# Patient Record
Sex: FEMALE | Race: BLACK OR AFRICAN AMERICAN | NOT HISPANIC OR LATINO | Employment: OTHER | URBAN - METROPOLITAN AREA
[De-identification: names, ages, dates, MRNs, and addresses within clinical notes are randomized per-mention and may not be internally consistent; named-entity substitution may affect disease eponyms.]

---

## 2017-01-01 ENCOUNTER — TELEPHONE (OUTPATIENT)
Dept: INFUSION THERAPY | Facility: HOSPITAL | Age: 62
End: 2017-01-01

## 2017-01-01 ENCOUNTER — INITIAL CONSULT (OUTPATIENT)
Dept: INTERVENTIONAL RADIOLOGY/VASCULAR | Facility: CLINIC | Age: 62
End: 2017-01-01
Payer: MEDICARE

## 2017-01-01 ENCOUNTER — INFUSION (OUTPATIENT)
Dept: INFUSION THERAPY | Facility: HOSPITAL | Age: 62
End: 2017-01-01
Attending: INTERNAL MEDICINE
Payer: MEDICARE

## 2017-01-01 ENCOUNTER — HOSPITAL ENCOUNTER (OUTPATIENT)
Dept: RADIOLOGY | Facility: HOSPITAL | Age: 62
Discharge: HOME | End: 2017-02-21
Attending: INTERNAL MEDICINE
Payer: MEDICARE

## 2017-01-01 ENCOUNTER — TELEPHONE (OUTPATIENT)
Dept: NEUROSURGERY | Facility: CLINIC | Age: 62
End: 2017-01-01

## 2017-01-01 ENCOUNTER — DOCUMENTATION ONLY (OUTPATIENT)
Dept: RADIATION ONCOLOGY | Facility: CLINIC | Age: 62
End: 2017-01-01

## 2017-01-01 ENCOUNTER — HOSPITAL ENCOUNTER (INPATIENT)
Facility: HOSPITAL | Age: 62
LOS: 2 days | Discharge: HOME | DRG: 054 | End: 2017-11-28
Attending: EMERGENCY MEDICINE | Admitting: ANESTHESIOLOGY
Payer: MEDICARE

## 2017-01-01 ENCOUNTER — OFFICE VISIT (OUTPATIENT)
Dept: HEMATOLOGY/ONCOLOGY | Facility: CLINIC | Age: 62
End: 2017-01-01
Payer: MEDICARE

## 2017-01-01 ENCOUNTER — LAB VISIT (OUTPATIENT)
Dept: LAB | Facility: HOSPITAL | Age: 62
End: 2017-01-01
Attending: INTERNAL MEDICINE
Payer: MEDICARE

## 2017-01-01 ENCOUNTER — LAB VISIT (OUTPATIENT)
Dept: LAB | Facility: OTHER | Age: 62
End: 2017-01-01
Attending: INTERNAL MEDICINE
Payer: MEDICARE

## 2017-01-01 ENCOUNTER — DOCUMENTATION ONLY (OUTPATIENT)
Dept: HEMATOLOGY/ONCOLOGY | Facility: CLINIC | Age: 62
End: 2017-01-01

## 2017-01-01 ENCOUNTER — LAB VISIT (OUTPATIENT)
Dept: LAB | Facility: HOSPITAL | Age: 62
End: 2017-01-01
Payer: MEDICARE

## 2017-01-01 ENCOUNTER — TELEPHONE (OUTPATIENT)
Dept: HEMATOLOGY/ONCOLOGY | Facility: CLINIC | Age: 62
End: 2017-01-01

## 2017-01-01 ENCOUNTER — TELEPHONE (OUTPATIENT)
Dept: INTERNAL MEDICINE | Facility: CLINIC | Age: 62
End: 2017-01-01

## 2017-01-01 ENCOUNTER — OFFICE VISIT (OUTPATIENT)
Dept: PRIMARY CARE CLINIC | Facility: CLINIC | Age: 62
End: 2017-01-01
Payer: MEDICARE

## 2017-01-01 ENCOUNTER — APPOINTMENT (OUTPATIENT)
Dept: RADIATION THERAPY | Facility: HOSPITAL | Age: 62
End: 2017-01-01
Attending: RADIOLOGY
Payer: MEDICARE

## 2017-01-01 ENCOUNTER — HOSPITAL ENCOUNTER (OUTPATIENT)
Facility: HOSPITAL | Age: 62
Discharge: HOME | End: 2017-03-14
Attending: INTERNAL MEDICINE | Admitting: INTERNAL MEDICINE
Payer: MEDICARE

## 2017-01-01 ENCOUNTER — OFFICE VISIT (OUTPATIENT)
Dept: HEMATOLOGY/ONCOLOGY | Facility: CLINIC | Age: 62
End: 2017-01-01
Attending: INTERNAL MEDICINE
Payer: MEDICARE

## 2017-01-01 ENCOUNTER — DOCUMENTATION ONLY (OUTPATIENT)
Dept: INFUSION THERAPY | Facility: HOSPITAL | Age: 62
End: 2017-01-01

## 2017-01-01 ENCOUNTER — TELEPHONE (OUTPATIENT)
Dept: SURGERY | Facility: CLINIC | Age: 62
End: 2017-01-01

## 2017-01-01 ENCOUNTER — INITIAL CONSULT (OUTPATIENT)
Dept: RADIATION ONCOLOGY | Facility: CLINIC | Age: 62
End: 2017-01-01
Attending: RADIOLOGY
Payer: MEDICARE

## 2017-01-01 ENCOUNTER — HOSPITAL ENCOUNTER (OUTPATIENT)
Dept: RADIOLOGY | Facility: HOSPITAL | Age: 62
Discharge: HOME | End: 2017-07-06
Attending: INTERNAL MEDICINE
Payer: MEDICARE

## 2017-01-01 ENCOUNTER — HOSPITAL ENCOUNTER (OUTPATIENT)
Dept: RADIATION THERAPY | Facility: HOSPITAL | Age: 62
Discharge: HOME | End: 2017-12-12
Attending: RADIOLOGY
Payer: MEDICARE

## 2017-01-01 VITALS
SYSTOLIC BLOOD PRESSURE: 199 MMHG | HEART RATE: 93 BPM | TEMPERATURE: 98 F | RESPIRATION RATE: 22 BRPM | DIASTOLIC BLOOD PRESSURE: 96 MMHG | WEIGHT: 108 LBS | OXYGEN SATURATION: 100 % | BODY MASS INDEX: 17.98 KG/M2

## 2017-01-01 VITALS
OXYGEN SATURATION: 92 % | RESPIRATION RATE: 18 BRPM | SYSTOLIC BLOOD PRESSURE: 146 MMHG | HEART RATE: 91 BPM | SYSTOLIC BLOOD PRESSURE: 186 MMHG | DIASTOLIC BLOOD PRESSURE: 87 MMHG | HEART RATE: 95 BPM | DIASTOLIC BLOOD PRESSURE: 82 MMHG | RESPIRATION RATE: 18 BRPM | TEMPERATURE: 98 F | BODY MASS INDEX: 14.53 KG/M2 | WEIGHT: 90.38 LBS | HEIGHT: 66 IN

## 2017-01-01 VITALS
RESPIRATION RATE: 18 BRPM | DIASTOLIC BLOOD PRESSURE: 68 MMHG | HEART RATE: 86 BPM | OXYGEN SATURATION: 98 % | BODY MASS INDEX: 15.06 KG/M2 | TEMPERATURE: 97 F | HEIGHT: 65 IN | SYSTOLIC BLOOD PRESSURE: 146 MMHG | WEIGHT: 90.38 LBS

## 2017-01-01 VITALS
HEART RATE: 82 BPM | TEMPERATURE: 98 F | HEART RATE: 80 BPM | DIASTOLIC BLOOD PRESSURE: 69 MMHG | DIASTOLIC BLOOD PRESSURE: 81 MMHG | TEMPERATURE: 98 F | RESPIRATION RATE: 18 BRPM | RESPIRATION RATE: 18 BRPM | HEIGHT: 66 IN | WEIGHT: 92.63 LBS | SYSTOLIC BLOOD PRESSURE: 177 MMHG | HEIGHT: 66 IN | DIASTOLIC BLOOD PRESSURE: 77 MMHG | OXYGEN SATURATION: 98 % | BODY MASS INDEX: 14.89 KG/M2 | HEART RATE: 81 BPM | SYSTOLIC BLOOD PRESSURE: 160 MMHG | TEMPERATURE: 98 F | RESPIRATION RATE: 18 BRPM | SYSTOLIC BLOOD PRESSURE: 149 MMHG | SYSTOLIC BLOOD PRESSURE: 144 MMHG | DIASTOLIC BLOOD PRESSURE: 68 MMHG | BODY MASS INDEX: 14.89 KG/M2 | RESPIRATION RATE: 18 BRPM | WEIGHT: 92.63 LBS | HEART RATE: 84 BPM

## 2017-01-01 VITALS
DIASTOLIC BLOOD PRESSURE: 71 MMHG | WEIGHT: 108 LBS | TEMPERATURE: 98 F | TEMPERATURE: 98 F | HEART RATE: 85 BPM | TEMPERATURE: 98 F | RESPIRATION RATE: 20 BRPM | SYSTOLIC BLOOD PRESSURE: 194 MMHG | SYSTOLIC BLOOD PRESSURE: 157 MMHG | BODY MASS INDEX: 17.99 KG/M2 | RESPIRATION RATE: 16 BRPM | HEIGHT: 65 IN | OXYGEN SATURATION: 100 % | DIASTOLIC BLOOD PRESSURE: 90 MMHG | DIASTOLIC BLOOD PRESSURE: 90 MMHG | HEART RATE: 87 BPM | SYSTOLIC BLOOD PRESSURE: 189 MMHG | RESPIRATION RATE: 18 BRPM | HEART RATE: 91 BPM

## 2017-01-01 VITALS
SYSTOLIC BLOOD PRESSURE: 110 MMHG | HEART RATE: 76 BPM | WEIGHT: 92.81 LBS | BODY MASS INDEX: 15.85 KG/M2 | HEIGHT: 64 IN | DIASTOLIC BLOOD PRESSURE: 74 MMHG

## 2017-01-01 VITALS
HEIGHT: 65 IN | WEIGHT: 108 LBS | TEMPERATURE: 98 F | BODY MASS INDEX: 17.98 KG/M2 | BODY MASS INDEX: 15.83 KG/M2 | SYSTOLIC BLOOD PRESSURE: 170 MMHG | SYSTOLIC BLOOD PRESSURE: 127 MMHG | WEIGHT: 95 LBS | HEART RATE: 80 BPM | DIASTOLIC BLOOD PRESSURE: 89 MMHG | DIASTOLIC BLOOD PRESSURE: 82 MMHG

## 2017-01-01 VITALS
HEART RATE: 88 BPM | DIASTOLIC BLOOD PRESSURE: 79 MMHG | TEMPERATURE: 99 F | SYSTOLIC BLOOD PRESSURE: 152 MMHG | RESPIRATION RATE: 20 BRPM

## 2017-01-01 VITALS — RESPIRATION RATE: 18 BRPM | DIASTOLIC BLOOD PRESSURE: 88 MMHG | HEART RATE: 75 BPM | SYSTOLIC BLOOD PRESSURE: 175 MMHG

## 2017-01-01 VITALS
DIASTOLIC BLOOD PRESSURE: 78 MMHG | TEMPERATURE: 98 F | HEART RATE: 86 BPM | RESPIRATION RATE: 18 BRPM | SYSTOLIC BLOOD PRESSURE: 154 MMHG

## 2017-01-01 VITALS
HEART RATE: 93 BPM | DIASTOLIC BLOOD PRESSURE: 81 MMHG | HEIGHT: 64 IN | SYSTOLIC BLOOD PRESSURE: 167 MMHG | WEIGHT: 92.5 LBS | BODY MASS INDEX: 15.79 KG/M2 | RESPIRATION RATE: 16 BRPM

## 2017-01-01 VITALS — HEART RATE: 89 BPM | RESPIRATION RATE: 16 BRPM | DIASTOLIC BLOOD PRESSURE: 80 MMHG | SYSTOLIC BLOOD PRESSURE: 170 MMHG

## 2017-01-01 VITALS
RESPIRATION RATE: 24 BRPM | TEMPERATURE: 98 F | HEART RATE: 109 BPM | SYSTOLIC BLOOD PRESSURE: 186 MMHG | DIASTOLIC BLOOD PRESSURE: 86 MMHG

## 2017-01-01 VITALS
DIASTOLIC BLOOD PRESSURE: 81 MMHG | TEMPERATURE: 98 F | RESPIRATION RATE: 16 BRPM | HEART RATE: 87 BPM | SYSTOLIC BLOOD PRESSURE: 182 MMHG

## 2017-01-01 VITALS
TEMPERATURE: 98 F | HEART RATE: 99 BPM | RESPIRATION RATE: 18 BRPM | DIASTOLIC BLOOD PRESSURE: 88 MMHG | SYSTOLIC BLOOD PRESSURE: 192 MMHG

## 2017-01-01 VITALS
RESPIRATION RATE: 15 BRPM | BODY MASS INDEX: 16.14 KG/M2 | WEIGHT: 94 LBS | OXYGEN SATURATION: 100 % | SYSTOLIC BLOOD PRESSURE: 149 MMHG | DIASTOLIC BLOOD PRESSURE: 74 MMHG | HEART RATE: 85 BPM | TEMPERATURE: 98 F

## 2017-01-01 VITALS
OXYGEN SATURATION: 98 % | BODY MASS INDEX: 16.73 KG/M2 | WEIGHT: 98 LBS | SYSTOLIC BLOOD PRESSURE: 165 MMHG | DIASTOLIC BLOOD PRESSURE: 78 MMHG | HEART RATE: 79 BPM | HEIGHT: 64 IN | RESPIRATION RATE: 16 BRPM | TEMPERATURE: 98 F

## 2017-01-01 VITALS
BODY MASS INDEX: 15.04 KG/M2 | DIASTOLIC BLOOD PRESSURE: 88 MMHG | WEIGHT: 90.38 LBS | RESPIRATION RATE: 20 BRPM | HEART RATE: 84 BPM | TEMPERATURE: 98 F | SYSTOLIC BLOOD PRESSURE: 180 MMHG

## 2017-01-01 VITALS
TEMPERATURE: 98 F | SYSTOLIC BLOOD PRESSURE: 142 MMHG | HEART RATE: 89 BPM | BODY MASS INDEX: 15.43 KG/M2 | OXYGEN SATURATION: 99 % | DIASTOLIC BLOOD PRESSURE: 64 MMHG | WEIGHT: 92.63 LBS | HEIGHT: 65 IN | RESPIRATION RATE: 18 BRPM

## 2017-01-01 VITALS
WEIGHT: 109.13 LBS | TEMPERATURE: 98 F | BODY MASS INDEX: 18.63 KG/M2 | OXYGEN SATURATION: 100 % | RESPIRATION RATE: 26 BRPM | HEIGHT: 64 IN | HEART RATE: 84 BPM | SYSTOLIC BLOOD PRESSURE: 130 MMHG | DIASTOLIC BLOOD PRESSURE: 75 MMHG

## 2017-01-01 VITALS
SYSTOLIC BLOOD PRESSURE: 176 MMHG | TEMPERATURE: 99 F | HEART RATE: 92 BPM | RESPIRATION RATE: 16 BRPM | DIASTOLIC BLOOD PRESSURE: 80 MMHG

## 2017-01-01 VITALS
RESPIRATION RATE: 18 BRPM | HEART RATE: 80 BPM | TEMPERATURE: 98 F | SYSTOLIC BLOOD PRESSURE: 166 MMHG | DIASTOLIC BLOOD PRESSURE: 80 MMHG

## 2017-01-01 VITALS — SYSTOLIC BLOOD PRESSURE: 165 MMHG | DIASTOLIC BLOOD PRESSURE: 85 MMHG | HEART RATE: 88 BPM | RESPIRATION RATE: 18 BRPM

## 2017-01-01 VITALS
HEART RATE: 80 BPM | OXYGEN SATURATION: 97 % | SYSTOLIC BLOOD PRESSURE: 146 MMHG | TEMPERATURE: 98 F | DIASTOLIC BLOOD PRESSURE: 96 MMHG | RESPIRATION RATE: 22 BRPM

## 2017-01-01 DIAGNOSIS — C34.90 SMALL CELL LUNG CANCER, UNSPECIFIED LATERALITY: ICD-10-CM

## 2017-01-01 DIAGNOSIS — Z51.11 ENCOUNTER FOR ANTINEOPLASTIC CHEMOTHERAPY: Primary | ICD-10-CM

## 2017-01-01 DIAGNOSIS — J43.9 PULMONARY EMPHYSEMA, UNSPECIFIED EMPHYSEMA TYPE: ICD-10-CM

## 2017-01-01 DIAGNOSIS — C80.0 DISSEMINATED CANCER: ICD-10-CM

## 2017-01-01 DIAGNOSIS — C34.90 SMALL CELL LUNG CANCER, UNSPECIFIED LATERALITY: Primary | ICD-10-CM

## 2017-01-01 DIAGNOSIS — C79.31 BRAIN METASTASIS: Primary | ICD-10-CM

## 2017-01-01 DIAGNOSIS — Z51.11 ENCOUNTER FOR ANTINEOPLASTIC CHEMOTHERAPY: ICD-10-CM

## 2017-01-01 DIAGNOSIS — C79.51 BONE METASTASES: ICD-10-CM

## 2017-01-01 DIAGNOSIS — C34.30 MALIGNANT NEOPLASM OF LOWER LOBE OF LUNG, UNSPECIFIED LATERALITY: Primary | ICD-10-CM

## 2017-01-01 DIAGNOSIS — Z51.11 MAINTENANCE CHEMOTHERAPY: ICD-10-CM

## 2017-01-01 DIAGNOSIS — C34.90 SMALL CELL CARCINOMA OF LUNG, UNSPECIFIED LATERALITY: ICD-10-CM

## 2017-01-01 DIAGNOSIS — D69.59 THROMBOCYTOPENIA DUE TO DRUGS: ICD-10-CM

## 2017-01-01 DIAGNOSIS — Z51.11 MAINTENANCE CHEMOTHERAPY: Primary | ICD-10-CM

## 2017-01-01 DIAGNOSIS — F51.01 PRIMARY INSOMNIA: ICD-10-CM

## 2017-01-01 DIAGNOSIS — C34.90 LUNG CANCER: Primary | ICD-10-CM

## 2017-01-01 DIAGNOSIS — R73.09 ELEVATED HEMOGLOBIN A1C: ICD-10-CM

## 2017-01-01 DIAGNOSIS — G93.9 PONTINE LESION: ICD-10-CM

## 2017-01-01 DIAGNOSIS — R52 ACUTE PAIN: ICD-10-CM

## 2017-01-01 DIAGNOSIS — D49.9 NEOPLASM: ICD-10-CM

## 2017-01-01 DIAGNOSIS — I61.8 OTHER NONTRAUMATIC INTRACEREBRAL HEMORRHAGE, UNSPECIFIED LATERALITY: ICD-10-CM

## 2017-01-01 DIAGNOSIS — E11.8 TYPE 2 DIABETES MELLITUS WITH COMPLICATION, UNSPECIFIED LONG TERM INSULIN USE STATUS: ICD-10-CM

## 2017-01-01 DIAGNOSIS — C79.31 BRAIN METASTASIS: ICD-10-CM

## 2017-01-01 DIAGNOSIS — N39.0 URINARY TRACT INFECTION WITHOUT HEMATURIA, SITE UNSPECIFIED: ICD-10-CM

## 2017-01-01 DIAGNOSIS — I35.9 NONRHEUMATIC AORTIC VALVE DISORDER: ICD-10-CM

## 2017-01-01 DIAGNOSIS — R20.0 LEFT SIDED NUMBNESS: ICD-10-CM

## 2017-01-01 DIAGNOSIS — C34.92 PRIMARY MALIGNANT NEOPLASM OF LEFT LUNG METASTATIC TO OTHER SITE: ICD-10-CM

## 2017-01-01 DIAGNOSIS — I10 ESSENTIAL HYPERTENSION: ICD-10-CM

## 2017-01-01 DIAGNOSIS — N17.9 AKI (ACUTE KIDNEY INJURY): Primary | ICD-10-CM

## 2017-01-01 DIAGNOSIS — C34.30 MALIGNANT NEOPLASM OF LOWER LOBE OF LUNG, UNSPECIFIED LATERALITY: ICD-10-CM

## 2017-01-01 DIAGNOSIS — T50.905A THROMBOCYTOPENIA DUE TO DRUGS: ICD-10-CM

## 2017-01-01 DIAGNOSIS — C79.51 BONE METASTASES: Primary | ICD-10-CM

## 2017-01-01 DIAGNOSIS — J43.8 OTHER EMPHYSEMA: ICD-10-CM

## 2017-01-01 DIAGNOSIS — C34.92 SMALL CELL LUNG CANCER, LEFT: ICD-10-CM

## 2017-01-01 DIAGNOSIS — C34.92 SMALL CELL LUNG CANCER, LEFT: Primary | ICD-10-CM

## 2017-01-01 LAB
ALBUMIN SERPL BCP-MCNC: 3 G/DL
ALBUMIN SERPL BCP-MCNC: 3.2 G/DL
ALBUMIN SERPL BCP-MCNC: 3.4 G/DL
ALBUMIN SERPL BCP-MCNC: 3.4 G/DL
ALBUMIN SERPL BCP-MCNC: 3.5 G/DL
ALBUMIN SERPL BCP-MCNC: 3.5 G/DL
ALBUMIN SERPL BCP-MCNC: 3.6 G/DL
ALBUMIN SERPL BCP-MCNC: 3.8 G/DL
ALP SERPL-CCNC: 48 U/L
ALP SERPL-CCNC: 51 U/L
ALP SERPL-CCNC: 54 U/L
ALP SERPL-CCNC: 55 U/L
ALP SERPL-CCNC: 55 U/L
ALP SERPL-CCNC: 83 U/L
ALP SERPL-CCNC: 86 U/L
ALP SERPL-CCNC: 90 U/L
ALT SERPL W/O P-5'-P-CCNC: 10 U/L
ALT SERPL W/O P-5'-P-CCNC: 12 U/L
ALT SERPL W/O P-5'-P-CCNC: 5 U/L
ALT SERPL W/O P-5'-P-CCNC: 6 U/L
ALT SERPL W/O P-5'-P-CCNC: 7 U/L
ALT SERPL W/O P-5'-P-CCNC: 8 U/L
ANION GAP SERPL CALC-SCNC: 11 MMOL/L
ANION GAP SERPL CALC-SCNC: 5 MMOL/L
ANION GAP SERPL CALC-SCNC: 6 MMOL/L
ANION GAP SERPL CALC-SCNC: 8 MMOL/L
ANION GAP SERPL CALC-SCNC: 9 MMOL/L
ANION GAP SERPL CALC-SCNC: 9 MMOL/L
AST SERPL-CCNC: 12 U/L
AST SERPL-CCNC: 12 U/L
AST SERPL-CCNC: 13 U/L
AST SERPL-CCNC: 14 U/L
AST SERPL-CCNC: 15 U/L
AST SERPL-CCNC: 15 U/L
AST SERPL-CCNC: 16 U/L
AST SERPL-CCNC: 17 U/L
BACTERIA #/AREA URNS HPF: ABNORMAL /HPF
BACTERIA UR CULT: NORMAL
BASOPHILS # BLD AUTO: 0.02 K/UL
BASOPHILS # BLD AUTO: 0.03 K/UL
BASOPHILS # BLD AUTO: 0.03 K/UL
BASOPHILS NFR BLD: 0.2 %
BASOPHILS NFR BLD: 0.3 %
BASOPHILS NFR BLD: 0.3 %
BASOPHILS NFR BLD: 0.4 %
BASOPHILS NFR BLD: 0.5 %
BILIRUB SERPL-MCNC: 0.1 MG/DL
BILIRUB SERPL-MCNC: 0.2 MG/DL
BILIRUB SERPL-MCNC: 0.3 MG/DL
BILIRUB SERPL-MCNC: 0.7 MG/DL
BILIRUB UR QL STRIP: NEGATIVE
BNP SERPL-MCNC: 37 PG/ML
BUN SERPL-MCNC: 15 MG/DL
BUN SERPL-MCNC: 20 MG/DL
BUN SERPL-MCNC: 22 MG/DL
BUN SERPL-MCNC: 22 MG/DL
BUN SERPL-MCNC: 24 MG/DL
BUN SERPL-MCNC: 25 MG/DL
BUN SERPL-MCNC: 26 MG/DL
BUN SERPL-MCNC: 32 MG/DL
CALCIUM SERPL-MCNC: 8.8 MG/DL
CALCIUM SERPL-MCNC: 9 MG/DL
CALCIUM SERPL-MCNC: 9.1 MG/DL
CALCIUM SERPL-MCNC: 9.4 MG/DL
CALCIUM SERPL-MCNC: 9.6 MG/DL
CALCIUM SERPL-MCNC: 9.6 MG/DL
CALCIUM SERPL-MCNC: 9.7 MG/DL
CALCIUM SERPL-MCNC: 9.8 MG/DL
CHLORIDE SERPL-SCNC: 100 MMOL/L
CHLORIDE SERPL-SCNC: 102 MMOL/L
CHLORIDE SERPL-SCNC: 105 MMOL/L
CHLORIDE SERPL-SCNC: 105 MMOL/L
CHLORIDE SERPL-SCNC: 106 MMOL/L
CHLORIDE SERPL-SCNC: 107 MMOL/L
CHLORIDE SERPL-SCNC: 107 MMOL/L
CHLORIDE SERPL-SCNC: 108 MMOL/L
CLARITY UR: CLEAR
CO2 SERPL-SCNC: 25 MMOL/L
CO2 SERPL-SCNC: 25 MMOL/L
CO2 SERPL-SCNC: 26 MMOL/L
CO2 SERPL-SCNC: 28 MMOL/L
CO2 SERPL-SCNC: 28 MMOL/L
CO2 SERPL-SCNC: 30 MMOL/L
COLOR UR: YELLOW
CREAT SERPL-MCNC: 0.9 MG/DL
CREAT SERPL-MCNC: 1.1 MG/DL
CREAT SERPL-MCNC: 1.3 MG/DL
CREAT SERPL-MCNC: 1.3 MG/DL
CREAT SERPL-MCNC: 1.4 MG/DL
CREAT SERPL-MCNC: 1.7 MG/DL
DIFFERENTIAL METHOD: ABNORMAL
EOSINOPHIL # BLD AUTO: 0.1 K/UL
EOSINOPHIL # BLD AUTO: 0.2 K/UL
EOSINOPHIL # BLD AUTO: 0.2 K/UL
EOSINOPHIL NFR BLD: 0.8 %
EOSINOPHIL NFR BLD: 1.3 %
EOSINOPHIL NFR BLD: 1.5 %
EOSINOPHIL NFR BLD: 1.6 %
EOSINOPHIL NFR BLD: 2.6 %
ERYTHROCYTE [DISTWIDTH] IN BLOOD BY AUTOMATED COUNT: 14.4 %
ERYTHROCYTE [DISTWIDTH] IN BLOOD BY AUTOMATED COUNT: 14.6 %
ERYTHROCYTE [DISTWIDTH] IN BLOOD BY AUTOMATED COUNT: 15.3 %
ERYTHROCYTE [DISTWIDTH] IN BLOOD BY AUTOMATED COUNT: 15.4 %
ERYTHROCYTE [DISTWIDTH] IN BLOOD BY AUTOMATED COUNT: 15.5 %
ERYTHROCYTE [DISTWIDTH] IN BLOOD BY AUTOMATED COUNT: 15.5 %
ERYTHROCYTE [DISTWIDTH] IN BLOOD BY AUTOMATED COUNT: 17.9 %
ERYTHROCYTE [DISTWIDTH] IN BLOOD BY AUTOMATED COUNT: 18.2 %
EST. GFR  (AFRICAN AMERICAN): 37 ML/MIN/1.73 M^2
EST. GFR  (AFRICAN AMERICAN): 47.1 ML/MIN/1.73 M^2
EST. GFR  (AFRICAN AMERICAN): 51.5 ML/MIN/1.73 M^2
EST. GFR  (AFRICAN AMERICAN): 52 ML/MIN/1.73 M^2
EST. GFR  (AFRICAN AMERICAN): >60 ML/MIN/1.73 M^2
EST. GFR  (NON AFRICAN AMERICAN): 32 ML/MIN/1.73 M^2
EST. GFR  (NON AFRICAN AMERICAN): 40.9 ML/MIN/1.73 M^2
EST. GFR  (NON AFRICAN AMERICAN): 44.7 ML/MIN/1.73 M^2
EST. GFR  (NON AFRICAN AMERICAN): 45 ML/MIN/1.73 M^2
EST. GFR  (NON AFRICAN AMERICAN): 54.7 ML/MIN/1.73 M^2
EST. GFR  (NON AFRICAN AMERICAN): >60 ML/MIN/1.73 M^2
ESTIMATED AVG GLUCOSE: 140 MG/DL
ESTIMATED PA SYSTOLIC PRESSURE: 50.89
GLUCOSE SERPL-MCNC: 105 MG/DL
GLUCOSE SERPL-MCNC: 127 MG/DL
GLUCOSE SERPL-MCNC: 127 MG/DL (ref 70–110)
GLUCOSE SERPL-MCNC: 132 MG/DL
GLUCOSE SERPL-MCNC: 144 MG/DL
GLUCOSE SERPL-MCNC: 186 MG/DL
GLUCOSE SERPL-MCNC: 87 MG/DL
GLUCOSE SERPL-MCNC: 89 MG/DL
GLUCOSE SERPL-MCNC: 89 MG/DL
GLUCOSE UR QL STRIP: NEGATIVE
HBA1C MFR BLD HPLC: 6.5 %
HCT VFR BLD AUTO: 31.7 %
HCT VFR BLD AUTO: 33.3 %
HCT VFR BLD AUTO: 33.4 %
HCT VFR BLD AUTO: 33.5 %
HCT VFR BLD AUTO: 35.9 %
HCT VFR BLD AUTO: 39.4 %
HCT VFR BLD AUTO: 39.9 %
HCT VFR BLD AUTO: 40.9 %
HGB BLD-MCNC: 10.6 G/DL
HGB BLD-MCNC: 10.8 G/DL
HGB BLD-MCNC: 10.9 G/DL
HGB BLD-MCNC: 11.3 G/DL
HGB BLD-MCNC: 12.6 G/DL
HGB BLD-MCNC: 12.7 G/DL
HGB BLD-MCNC: 13.2 G/DL
HGB BLD-MCNC: 9.9 G/DL
HGB UR QL STRIP: ABNORMAL
IMM GRANULOCYTES # BLD AUTO: 0.01 K/UL
IMM GRANULOCYTES # BLD AUTO: 0.02 K/UL
IMM GRANULOCYTES # BLD AUTO: 0.02 K/UL
IMM GRANULOCYTES NFR BLD AUTO: 0.1 %
IMM GRANULOCYTES NFR BLD AUTO: 0.3 %
IMM GRANULOCYTES NFR BLD AUTO: 0.3 %
INR PPP: 1
KETONES UR QL STRIP: NEGATIVE
LEUKOCYTE ESTERASE UR QL STRIP: ABNORMAL
LYMPHOCYTES # BLD AUTO: 1.1 K/UL
LYMPHOCYTES # BLD AUTO: 1.9 K/UL
LYMPHOCYTES # BLD AUTO: 2 K/UL
LYMPHOCYTES # BLD AUTO: 2.1 K/UL
LYMPHOCYTES # BLD AUTO: 2.4 K/UL
LYMPHOCYTES NFR BLD: 15.6 %
LYMPHOCYTES NFR BLD: 16.6 %
LYMPHOCYTES NFR BLD: 26.1 %
LYMPHOCYTES NFR BLD: 26.7 %
LYMPHOCYTES NFR BLD: 36.9 %
MAGNESIUM SERPL-MCNC: 1.8 MG/DL
MAGNESIUM SERPL-MCNC: 1.9 MG/DL
MCH RBC QN AUTO: 27.3 PG
MCH RBC QN AUTO: 27.5 PG
MCH RBC QN AUTO: 27.9 PG
MCH RBC QN AUTO: 28 PG
MCH RBC QN AUTO: 28.1 PG
MCH RBC QN AUTO: 28.1 PG
MCH RBC QN AUTO: 28.5 PG
MCH RBC QN AUTO: 29.6 PG
MCHC RBC AUTO-ENTMCNC: 31.2 G/DL
MCHC RBC AUTO-ENTMCNC: 31.5 %
MCHC RBC AUTO-ENTMCNC: 31.6 G/DL
MCHC RBC AUTO-ENTMCNC: 31.8 %
MCHC RBC AUTO-ENTMCNC: 32 G/DL
MCHC RBC AUTO-ENTMCNC: 32.3 %
MCHC RBC AUTO-ENTMCNC: 32.3 G/DL
MCHC RBC AUTO-ENTMCNC: 32.7 %
MCV RBC AUTO: 85 FL
MCV RBC AUTO: 85 FL
MCV RBC AUTO: 86 FL
MCV RBC AUTO: 88 FL
MCV RBC AUTO: 90 FL
MCV RBC AUTO: 94 FL
MICROSCOPIC COMMENT: ABNORMAL
MONOCYTES # BLD AUTO: 0.4 K/UL
MONOCYTES # BLD AUTO: 0.6 K/UL
MONOCYTES # BLD AUTO: 0.6 K/UL
MONOCYTES # BLD AUTO: 0.7 K/UL
MONOCYTES # BLD AUTO: 1 K/UL
MONOCYTES NFR BLD: 12.4 %
MONOCYTES NFR BLD: 5.3 %
MONOCYTES NFR BLD: 6 %
MONOCYTES NFR BLD: 8 %
MONOCYTES NFR BLD: 9.1 %
NEUTROPHILS # BLD AUTO: 3.1 K/UL
NEUTROPHILS # BLD AUTO: 3.6 K/UL
NEUTROPHILS # BLD AUTO: 3.9 K/UL
NEUTROPHILS # BLD AUTO: 4.7 K/UL
NEUTROPHILS # BLD AUTO: 4.7 K/UL
NEUTROPHILS # BLD AUTO: 4.8 K/UL
NEUTROPHILS # BLD AUTO: 8.6 K/UL
NEUTROPHILS # BLD AUTO: 9.1 K/UL
NEUTROPHILS NFR BLD: 54.7 %
NEUTROPHILS NFR BLD: 59.3 %
NEUTROPHILS NFR BLD: 63.3 %
NEUTROPHILS NFR BLD: 72.7 %
NEUTROPHILS NFR BLD: 76.1 %
NITRITE UR QL STRIP: NEGATIVE
NRBC BLD-RTO: 0 /100 WBC
PH UR STRIP: 6 [PH] (ref 5–8)
PHOSPHATE SERPL-MCNC: 2.9 MG/DL
PHOSPHATE SERPL-MCNC: 3.6 MG/DL
PHOSPHATE SERPL-MCNC: 3.9 MG/DL
PHOSPHATE SERPL-MCNC: 4.3 MG/DL
PLATELET # BLD AUTO: 156 K/UL
PLATELET # BLD AUTO: 165 K/UL
PLATELET # BLD AUTO: 177 K/UL
PLATELET # BLD AUTO: 191 K/UL
PLATELET # BLD AUTO: 268 K/UL
PLATELET # BLD AUTO: 287 K/UL
PLATELET # BLD AUTO: 295 K/UL
PLATELET # BLD AUTO: 99 K/UL
PMV BLD AUTO: 10.2 FL
PMV BLD AUTO: 10.3 FL
PMV BLD AUTO: 10.4 FL
PMV BLD AUTO: 10.5 FL
PMV BLD AUTO: 10.7 FL
PMV BLD AUTO: 10.8 FL
PMV BLD AUTO: 10.9 FL
PMV BLD AUTO: 9.9 FL
POCT GLUCOSE: 110 MG/DL (ref 70–110)
POCT GLUCOSE: 110 MG/DL (ref 70–110)
POCT GLUCOSE: 114 MG/DL (ref 70–110)
POCT GLUCOSE: 129 MG/DL (ref 70–110)
POCT GLUCOSE: 138 MG/DL (ref 70–110)
POCT GLUCOSE: 198 MG/DL (ref 70–110)
POCT GLUCOSE: 222 MG/DL (ref 70–110)
POTASSIUM SERPL-SCNC: 3.8 MMOL/L
POTASSIUM SERPL-SCNC: 3.9 MMOL/L
POTASSIUM SERPL-SCNC: 3.9 MMOL/L
POTASSIUM SERPL-SCNC: 4 MMOL/L
POTASSIUM SERPL-SCNC: 4.1 MMOL/L
POTASSIUM SERPL-SCNC: 4.1 MMOL/L
POTASSIUM SERPL-SCNC: 4.4 MMOL/L
POTASSIUM SERPL-SCNC: 4.5 MMOL/L
PROT SERPL-MCNC: 6.8 G/DL
PROT SERPL-MCNC: 6.9 G/DL
PROT SERPL-MCNC: 7.1 G/DL
PROT SERPL-MCNC: 7.2 G/DL
PROT SERPL-MCNC: 7.3 G/DL
PROT SERPL-MCNC: 7.8 G/DL
PROT SERPL-MCNC: 7.9 G/DL
PROT SERPL-MCNC: 8 G/DL
PROT UR QL STRIP: NEGATIVE
PROTHROMBIN TIME: 10.7 SEC
PROTHROMBIN TIME: 10.8 SEC
RBC # BLD AUTO: 3.52 M/UL
RBC # BLD AUTO: 3.79 M/UL
RBC # BLD AUTO: 3.79 M/UL
RBC # BLD AUTO: 3.82 M/UL
RBC # BLD AUTO: 3.9 M/UL
RBC # BLD AUTO: 4.49 M/UL
RBC # BLD AUTO: 4.66 M/UL
RBC # BLD AUTO: 4.8 M/UL
RBC #/AREA URNS HPF: 1 /HPF (ref 0–4)
RETIRED EF AND QEF - SEE NOTES: 65 (ref 55–65)
SODIUM SERPL-SCNC: 139 MMOL/L
SODIUM SERPL-SCNC: 141 MMOL/L
SODIUM SERPL-SCNC: 142 MMOL/L
SODIUM SERPL-SCNC: 142 MMOL/L
SP GR UR STRIP: 1.01 (ref 1–1.03)
TRICUSPID VALVE REGURGITATION: ABNORMAL
URN SPEC COLLECT METH UR: ABNORMAL
UROBILINOGEN UR STRIP-ACNC: NEGATIVE EU/DL
WBC # BLD AUTO: 11.99 K/UL
WBC # BLD AUTO: 12.12 K/UL
WBC # BLD AUTO: 5.07 K/UL
WBC # BLD AUTO: 6.49 K/UL
WBC # BLD AUTO: 6.61 K/UL
WBC # BLD AUTO: 7.23 K/UL
WBC # BLD AUTO: 7.59 K/UL
WBC # BLD AUTO: 7.88 K/UL
WBC #/AREA URNS HPF: 12 /HPF (ref 0–5)
WBC CLUMPS URNS QL MICRO: ABNORMAL

## 2017-01-01 PROCEDURE — 99999 PR PBB SHADOW E&M-EST. PATIENT-LVL II: CPT | Mod: PBBFAC,,, | Performed by: INTERNAL MEDICINE

## 2017-01-01 PROCEDURE — 85027 COMPLETE CBC AUTOMATED: CPT

## 2017-01-01 PROCEDURE — 77300 RADIATION THERAPY DOSE PLAN: CPT | Mod: TC | Performed by: RADIOLOGY

## 2017-01-01 PROCEDURE — 99215 OFFICE O/P EST HI 40 MIN: CPT | Mod: S$GLB,,, | Performed by: INTERNAL MEDICINE

## 2017-01-01 PROCEDURE — 25000003 PHARM REV CODE 250: Performed by: STUDENT IN AN ORGANIZED HEALTH CARE EDUCATION/TRAINING PROGRAM

## 2017-01-01 PROCEDURE — 83735 ASSAY OF MAGNESIUM: CPT

## 2017-01-01 PROCEDURE — 96413 CHEMO IV INFUSION 1 HR: CPT

## 2017-01-01 PROCEDURE — 25000003 PHARM REV CODE 250: Performed by: INTERNAL MEDICINE

## 2017-01-01 PROCEDURE — 99499 UNLISTED E&M SERVICE: CPT | Mod: ,,, | Performed by: EMERGENCY MEDICINE

## 2017-01-01 PROCEDURE — 80053 COMPREHEN METABOLIC PANEL: CPT

## 2017-01-01 PROCEDURE — G8978 MOBILITY CURRENT STATUS: HCPCS | Mod: CK

## 2017-01-01 PROCEDURE — 63600175 PHARM REV CODE 636 W HCPCS: Performed by: INTERNAL MEDICINE

## 2017-01-01 PROCEDURE — 99999 PR PBB SHADOW E&M-EST. PATIENT-LVL III: CPT | Mod: PBBFAC,,, | Performed by: RADIOLOGY

## 2017-01-01 PROCEDURE — 96417 CHEMO IV INFUS EACH ADDL SEQ: CPT

## 2017-01-01 PROCEDURE — 96366 THER/PROPH/DIAG IV INF ADDON: CPT

## 2017-01-01 PROCEDURE — 84100 ASSAY OF PHOSPHORUS: CPT

## 2017-01-01 PROCEDURE — 96372 THER/PROPH/DIAG INJ SC/IM: CPT

## 2017-01-01 PROCEDURE — 99999 PR PBB SHADOW E&M-EST. PATIENT-LVL III: CPT | Mod: 25,PBBFAC,, | Performed by: INTERNAL MEDICINE

## 2017-01-01 PROCEDURE — A9585 GADOBUTROL INJECTION: HCPCS | Performed by: ANESTHESIOLOGY

## 2017-01-01 PROCEDURE — 99214 OFFICE O/P EST MOD 30 MIN: CPT | Mod: 25,S$GLB,, | Performed by: NURSE PRACTITIONER

## 2017-01-01 PROCEDURE — 63600175 PHARM REV CODE 636 W HCPCS: Performed by: NURSE PRACTITIONER

## 2017-01-01 PROCEDURE — 97161 PT EVAL LOW COMPLEX 20 MIN: CPT

## 2017-01-01 PROCEDURE — 99999 PR PBB SHADOW E&M-EST. PATIENT-LVL III: CPT | Mod: PBBFAC,,, | Performed by: INTERNAL MEDICINE

## 2017-01-01 PROCEDURE — 25500020 PHARM REV CODE 255: Performed by: ANESTHESIOLOGY

## 2017-01-01 PROCEDURE — 77336 RADIATION PHYSICS CONSULT: CPT | Performed by: RADIOLOGY

## 2017-01-01 PROCEDURE — 96375 TX/PRO/DX INJ NEW DRUG ADDON: CPT

## 2017-01-01 PROCEDURE — 99205 OFFICE O/P NEW HI 60 MIN: CPT | Mod: S$GLB,,, | Performed by: INTERNAL MEDICINE

## 2017-01-01 PROCEDURE — 82962 GLUCOSE BLOOD TEST: CPT

## 2017-01-01 PROCEDURE — 77412 RADIATION TX DELIVERY LVL 3: CPT | Performed by: RADIOLOGY

## 2017-01-01 PROCEDURE — 81000 URINALYSIS NONAUTO W/SCOPE: CPT

## 2017-01-01 PROCEDURE — 36415 COLL VENOUS BLD VENIPUNCTURE: CPT

## 2017-01-01 PROCEDURE — 25000003 PHARM REV CODE 250: Performed by: NURSE PRACTITIONER

## 2017-01-01 PROCEDURE — 85025 COMPLETE CBC W/AUTO DIFF WBC: CPT

## 2017-01-01 PROCEDURE — 27000221 HC OXYGEN, UP TO 24 HOURS

## 2017-01-01 PROCEDURE — 96367 TX/PROPH/DG ADDL SEQ IV INF: CPT

## 2017-01-01 PROCEDURE — 85610 PROTHROMBIN TIME: CPT

## 2017-01-01 PROCEDURE — 63600175 PHARM REV CODE 636 W HCPCS: Performed by: STUDENT IN AN ORGANIZED HEALTH CARE EDUCATION/TRAINING PROGRAM

## 2017-01-01 PROCEDURE — 93306 TTE W/DOPPLER COMPLETE: CPT

## 2017-01-01 PROCEDURE — 93306 TTE W/DOPPLER COMPLETE: CPT | Mod: 26,,, | Performed by: INTERNAL MEDICINE

## 2017-01-01 PROCEDURE — G8980 MOBILITY D/C STATUS: HCPCS | Mod: CK

## 2017-01-01 PROCEDURE — 99999 PR PBB SHADOW E&M-EST. PATIENT-LVL III: CPT | Mod: PBBFAC,,, | Performed by: NURSE PRACTITIONER

## 2017-01-01 PROCEDURE — 99499 UNLISTED E&M SERVICE: CPT | Mod: S$GLB,,, | Performed by: INTERNAL MEDICINE

## 2017-01-01 PROCEDURE — 99223 1ST HOSP IP/OBS HIGH 75: CPT | Mod: GC,,, | Performed by: NEUROLOGICAL SURGERY

## 2017-01-01 PROCEDURE — 1160F RVW MEDS BY RX/DR IN RCRD: CPT | Mod: S$GLB,,, | Performed by: INTERNAL MEDICINE

## 2017-01-01 PROCEDURE — 99233 SBSQ HOSP IP/OBS HIGH 50: CPT | Mod: ,,, | Performed by: PSYCHIATRY & NEUROLOGY

## 2017-01-01 PROCEDURE — 78815 PET IMAGE W/CT SKULL-THIGH: CPT | Mod: 26,PI,, | Performed by: RADIOLOGY

## 2017-01-01 PROCEDURE — 94640 AIRWAY INHALATION TREATMENT: CPT

## 2017-01-01 PROCEDURE — 77290 THER RAD SIMULAJ FIELD CPLX: CPT | Mod: TC | Performed by: RADIOLOGY

## 2017-01-01 PROCEDURE — 99291 CRITICAL CARE FIRST HOUR: CPT | Mod: 25

## 2017-01-01 PROCEDURE — 77334 RADIATION TREATMENT AID(S): CPT | Mod: TC | Performed by: RADIOLOGY

## 2017-01-01 PROCEDURE — 94761 N-INVAS EAR/PLS OXIMETRY MLT: CPT

## 2017-01-01 PROCEDURE — G8979 MOBILITY GOAL STATUS: HCPCS | Mod: CK

## 2017-01-01 PROCEDURE — 63600175 PHARM REV CODE 636 W HCPCS: Performed by: PSYCHIATRY & NEUROLOGY

## 2017-01-01 PROCEDURE — 92610 EVALUATE SWALLOWING FUNCTION: CPT

## 2017-01-01 PROCEDURE — 77014 HC CT GUIDANCE RADIATION THERAPY FLDS PLACEMENT: CPT | Mod: TC | Performed by: RADIOLOGY

## 2017-01-01 PROCEDURE — 77263 THER RADIOLOGY TX PLNG CPLX: CPT | Mod: ,,, | Performed by: RADIOLOGY

## 2017-01-01 PROCEDURE — 96361 HYDRATE IV INFUSION ADD-ON: CPT

## 2017-01-01 PROCEDURE — 77295 3-D RADIOTHERAPY PLAN: CPT | Mod: TC | Performed by: RADIOLOGY

## 2017-01-01 PROCEDURE — 77300 RADIATION THERAPY DOSE PLAN: CPT | Mod: 26,,, | Performed by: RADIOLOGY

## 2017-01-01 PROCEDURE — 77295 3-D RADIOTHERAPY PLAN: CPT | Mod: 26,,, | Performed by: RADIOLOGY

## 2017-01-01 PROCEDURE — 92522 EVALUATE SPEECH PRODUCTION: CPT

## 2017-01-01 PROCEDURE — 99214 OFFICE O/P EST MOD 30 MIN: CPT | Mod: S$GLB,,, | Performed by: INTERNAL MEDICINE

## 2017-01-01 PROCEDURE — 20000000 HC ICU ROOM

## 2017-01-01 PROCEDURE — 87086 URINE CULTURE/COLONY COUNT: CPT

## 2017-01-01 PROCEDURE — 99223 1ST HOSP IP/OBS HIGH 75: CPT | Mod: GC,,, | Performed by: INTERNAL MEDICINE

## 2017-01-01 PROCEDURE — A9552 F18 FDG: HCPCS

## 2017-01-01 PROCEDURE — 25500020 PHARM REV CODE 255: Performed by: STUDENT IN AN ORGANIZED HEALTH CARE EDUCATION/TRAINING PROGRAM

## 2017-01-01 PROCEDURE — 83880 ASSAY OF NATRIURETIC PEPTIDE: CPT

## 2017-01-01 PROCEDURE — 99215 OFFICE O/P EST HI 40 MIN: CPT | Mod: S$GLB,,, | Performed by: RADIOLOGY

## 2017-01-01 PROCEDURE — 1160F RVW MEDS BY RX/DR IN RCRD: CPT | Mod: S$GLB,,, | Performed by: FAMILY MEDICINE

## 2017-01-01 PROCEDURE — 83036 HEMOGLOBIN GLYCOSYLATED A1C: CPT

## 2017-01-01 PROCEDURE — 99999 PR PBB SHADOW E&M-EST. PATIENT-LVL III: CPT | Mod: PBBFAC,,,

## 2017-01-01 PROCEDURE — 77417 THER RADIOLOGY PORT IMAGE(S): CPT | Performed by: RADIOLOGY

## 2017-01-01 PROCEDURE — 25000003 PHARM REV CODE 250

## 2017-01-01 PROCEDURE — 97165 OT EVAL LOW COMPLEX 30 MIN: CPT

## 2017-01-01 PROCEDURE — 99223 1ST HOSP IP/OBS HIGH 75: CPT | Mod: AI,,, | Performed by: ANESTHESIOLOGY

## 2017-01-01 PROCEDURE — 77334 RADIATION TREATMENT AID(S): CPT | Mod: 26,,, | Performed by: RADIOLOGY

## 2017-01-01 PROCEDURE — 99214 OFFICE O/P EST MOD 30 MIN: CPT | Mod: S$GLB,,, | Performed by: FAMILY MEDICINE

## 2017-01-01 PROCEDURE — 78815 PET IMAGE W/CT SKULL-THIGH: CPT | Mod: 26,PS,, | Performed by: NUCLEAR MEDICINE

## 2017-01-01 PROCEDURE — 63600175 PHARM REV CODE 636 W HCPCS: Performed by: RADIOLOGY

## 2017-01-01 PROCEDURE — 25000242 PHARM REV CODE 250 ALT 637 W/ HCPCS: Performed by: EMERGENCY MEDICINE

## 2017-01-01 PROCEDURE — 82948 REAGENT STRIP/BLOOD GLUCOSE: CPT | Mod: S$GLB,,, | Performed by: NURSE PRACTITIONER

## 2017-01-01 RX ORDER — HEPARIN 100 UNIT/ML
500 SYRINGE INTRAVENOUS
Status: DISCONTINUED | OUTPATIENT
Start: 2017-01-01 | End: 2017-01-01 | Stop reason: HOSPADM

## 2017-01-01 RX ORDER — SODIUM CHLORIDE 0.9 % (FLUSH) 0.9 %
10 SYRINGE (ML) INJECTION
Status: CANCELLED | OUTPATIENT
Start: 2017-01-01

## 2017-01-01 RX ORDER — HEPARIN 100 UNIT/ML
500 SYRINGE INTRAVENOUS
Status: CANCELLED | OUTPATIENT
Start: 2017-01-01

## 2017-01-01 RX ORDER — PROCHLORPERAZINE EDISYLATE 5 MG/ML
10 INJECTION INTRAMUSCULAR; INTRAVENOUS EVERY 6 HOURS PRN
Status: COMPLETED | OUTPATIENT
Start: 2017-01-01 | End: 2017-01-01

## 2017-01-01 RX ORDER — SODIUM CHLORIDE 0.9 % (FLUSH) 0.9 %
10 SYRINGE (ML) INJECTION
Status: DISCONTINUED | OUTPATIENT
Start: 2017-01-01 | End: 2017-01-01 | Stop reason: HOSPADM

## 2017-01-01 RX ORDER — ALBUTEROL SULFATE 0.63 MG/3ML
0.63 SOLUTION RESPIRATORY (INHALATION) EVERY 6 HOURS PRN
Qty: 1 BOX | Refills: 0 | Status: ON HOLD
Start: 2017-01-01 | End: 2018-01-01 | Stop reason: HOSPADM

## 2017-01-01 RX ORDER — CLONIDINE HYDROCHLORIDE 0.1 MG/1
0.1 TABLET ORAL ONCE
Status: COMPLETED | OUTPATIENT
Start: 2017-01-01 | End: 2017-01-01

## 2017-01-01 RX ORDER — PROCHLORPERAZINE EDISYLATE 5 MG/ML
10 INJECTION INTRAMUSCULAR; INTRAVENOUS EVERY 6 HOURS PRN
Status: DISCONTINUED | OUTPATIENT
Start: 2017-01-01 | End: 2017-01-01 | Stop reason: HOSPADM

## 2017-01-01 RX ORDER — PREDNISONE 10 MG/1
10 TABLET ORAL DAILY
Qty: 30 TABLET | Refills: 0 | Status: SHIPPED | OUTPATIENT
Start: 2017-01-01 | End: 2017-01-01 | Stop reason: SDUPTHER

## 2017-01-01 RX ORDER — PROCHLORPERAZINE EDISYLATE 5 MG/ML
10 INJECTION INTRAMUSCULAR; INTRAVENOUS EVERY 6 HOURS PRN
Status: CANCELLED | OUTPATIENT
Start: 2017-01-01

## 2017-01-01 RX ORDER — HEPARIN SODIUM 5000 [USP'U]/ML
5000 INJECTION, SOLUTION INTRAVENOUS; SUBCUTANEOUS EVERY 12 HOURS
Status: DISCONTINUED | OUTPATIENT
Start: 2017-01-01 | End: 2017-01-01 | Stop reason: HOSPADM

## 2017-01-01 RX ORDER — GLUCAGON 1 MG
1 KIT INJECTION
Status: DISCONTINUED | OUTPATIENT
Start: 2017-01-01 | End: 2017-01-01 | Stop reason: HOSPADM

## 2017-01-01 RX ORDER — ONDANSETRON 2 MG/ML
4 INJECTION INTRAMUSCULAR; INTRAVENOUS EVERY 8 HOURS PRN
Status: DISCONTINUED | OUTPATIENT
Start: 2017-01-01 | End: 2017-01-01 | Stop reason: HOSPADM

## 2017-01-01 RX ORDER — HYDRALAZINE HYDROCHLORIDE 20 MG/ML
10 INJECTION INTRAMUSCULAR; INTRAVENOUS EVERY 8 HOURS PRN
Status: DISCONTINUED | OUTPATIENT
Start: 2017-01-01 | End: 2017-01-01 | Stop reason: HOSPADM

## 2017-01-01 RX ORDER — OXYCODONE AND ACETAMINOPHEN 5; 325 MG/1; MG/1
1 TABLET ORAL EVERY 6 HOURS PRN
Qty: 20 TABLET | Refills: 0 | Status: SHIPPED | OUTPATIENT
Start: 2017-01-01 | End: 2017-01-01 | Stop reason: SDUPTHER

## 2017-01-01 RX ORDER — METFORMIN HYDROCHLORIDE 500 MG/1
500 TABLET ORAL NIGHTLY
Status: ON HOLD | COMMUNITY
End: 2018-01-01 | Stop reason: HOSPADM

## 2017-01-01 RX ORDER — ZOLPIDEM TARTRATE 5 MG/1
5 TABLET ORAL NIGHTLY PRN
Qty: 30 TABLET | Refills: 1 | Status: ON HOLD | OUTPATIENT
Start: 2017-01-01 | End: 2017-01-01 | Stop reason: CLARIF

## 2017-01-01 RX ORDER — ONDANSETRON 4 MG/1
4 TABLET, FILM COATED ORAL EVERY 4 HOURS PRN
Qty: 40 TABLET | Refills: 4 | Status: ON HOLD | OUTPATIENT
Start: 2017-01-01 | End: 2017-01-01 | Stop reason: CLARIF

## 2017-01-01 RX ORDER — SODIUM CHLORIDE 9 MG/ML
INJECTION, SOLUTION INTRAVENOUS CONTINUOUS
Status: DISCONTINUED | OUTPATIENT
Start: 2017-01-01 | End: 2017-01-01

## 2017-01-01 RX ORDER — SODIUM CHLORIDE 9 MG/ML
500 INJECTION, SOLUTION INTRAVENOUS CONTINUOUS
Status: DISCONTINUED | OUTPATIENT
Start: 2017-01-01 | End: 2017-01-01 | Stop reason: HOSPADM

## 2017-01-01 RX ORDER — HYDROCODONE BITARTRATE AND ACETAMINOPHEN 10; 325 MG/1; MG/1
TABLET ORAL EVERY 12 HOURS PRN
COMMUNITY
End: 2017-01-01

## 2017-01-01 RX ORDER — ACETAMINOPHEN 325 MG/1
TABLET ORAL
Status: COMPLETED
Start: 2017-01-01 | End: 2017-01-01

## 2017-01-01 RX ORDER — LOSARTAN POTASSIUM AND HYDROCHLOROTHIAZIDE 12.5; 1 MG/1; MG/1
1 TABLET ORAL DAILY
Status: DISCONTINUED | OUTPATIENT
Start: 2017-01-01 | End: 2017-01-01 | Stop reason: HOSPADM

## 2017-01-01 RX ORDER — ACETAMINOPHEN 325 MG/1
650 TABLET ORAL EVERY 6 HOURS PRN
Status: DISCONTINUED | OUTPATIENT
Start: 2017-01-01 | End: 2017-01-01 | Stop reason: HOSPADM

## 2017-01-01 RX ORDER — CLONIDINE HYDROCHLORIDE 0.1 MG/1
0.1 TABLET ORAL
Status: COMPLETED | OUTPATIENT
Start: 2017-01-01 | End: 2017-01-01

## 2017-01-01 RX ORDER — VALSARTAN 160 MG/1
160 TABLET ORAL DAILY
Qty: 30 TABLET | Refills: 1 | Status: ON HOLD | OUTPATIENT
Start: 2017-01-01 | End: 2018-01-01 | Stop reason: HOSPADM

## 2017-01-01 RX ORDER — PREDNISONE 10 MG/1
10 TABLET ORAL DAILY
Status: DISCONTINUED | OUTPATIENT
Start: 2017-01-01 | End: 2017-01-01 | Stop reason: HOSPADM

## 2017-01-01 RX ORDER — INSULIN ASPART 100 [IU]/ML
0-5 INJECTION, SOLUTION INTRAVENOUS; SUBCUTANEOUS EVERY 6 HOURS PRN
Status: DISCONTINUED | OUTPATIENT
Start: 2017-01-01 | End: 2017-01-01 | Stop reason: HOSPADM

## 2017-01-01 RX ORDER — CEFAZOLIN SODIUM 1 G/50ML
1 SOLUTION INTRAVENOUS
Status: COMPLETED | OUTPATIENT
Start: 2017-01-01 | End: 2017-01-01

## 2017-01-01 RX ORDER — HEPARIN 100 UNIT/ML
3 SYRINGE INTRAVENOUS ONCE
Status: COMPLETED | OUTPATIENT
Start: 2017-01-01 | End: 2017-01-01

## 2017-01-01 RX ORDER — ALBUTEROL SULFATE 0.83 MG/ML
2.5 SOLUTION RESPIRATORY (INHALATION) EVERY 4 HOURS
Status: DISCONTINUED | OUTPATIENT
Start: 2017-01-01 | End: 2017-01-01

## 2017-01-01 RX ORDER — PREDNISONE 10 MG/1
10 TABLET ORAL DAILY
Qty: 30 TABLET | Refills: 0 | Status: SHIPPED | OUTPATIENT
Start: 2017-01-01 | End: 2018-01-01 | Stop reason: DRUGHIGH

## 2017-01-01 RX ORDER — MIDAZOLAM HYDROCHLORIDE 1 MG/ML
INJECTION, SOLUTION INTRAMUSCULAR; INTRAVENOUS CODE/TRAUMA/SEDATION MEDICATION
Status: COMPLETED | OUTPATIENT
Start: 2017-01-01 | End: 2017-01-01

## 2017-01-01 RX ORDER — OXYCODONE AND ACETAMINOPHEN 5; 325 MG/1; MG/1
1 TABLET ORAL EVERY 6 HOURS PRN
Qty: 60 TABLET | Refills: 0 | Status: SHIPPED | OUTPATIENT
Start: 2017-01-01 | End: 2018-01-01 | Stop reason: SDUPTHER

## 2017-01-01 RX ORDER — IPRATROPIUM BROMIDE AND ALBUTEROL SULFATE 2.5; .5 MG/3ML; MG/3ML
3 SOLUTION RESPIRATORY (INHALATION) EVERY 4 HOURS PRN
Status: DISCONTINUED | OUTPATIENT
Start: 2017-01-01 | End: 2017-01-01 | Stop reason: HOSPADM

## 2017-01-01 RX ORDER — GADOBUTROL 604.72 MG/ML
5 INJECTION INTRAVENOUS
Status: COMPLETED | OUTPATIENT
Start: 2017-01-01 | End: 2017-01-01

## 2017-01-01 RX ORDER — FENTANYL CITRATE 50 UG/ML
INJECTION, SOLUTION INTRAMUSCULAR; INTRAVENOUS CODE/TRAUMA/SEDATION MEDICATION
Status: COMPLETED | OUTPATIENT
Start: 2017-01-01 | End: 2017-01-01

## 2017-01-01 RX ORDER — IPRATROPIUM BROMIDE AND ALBUTEROL SULFATE 2.5; .5 MG/3ML; MG/3ML
3 SOLUTION RESPIRATORY (INHALATION)
Status: COMPLETED | OUTPATIENT
Start: 2017-01-01 | End: 2017-01-01

## 2017-01-01 RX ADMIN — PREDNISONE 10 MG: 10 TABLET ORAL at 08:11

## 2017-01-01 RX ADMIN — PREDNISONE 10 MG: 10 TABLET ORAL at 09:11

## 2017-01-01 RX ADMIN — MAGNESIUM SULFATE: 500 INJECTION, SOLUTION INTRAMUSCULAR; INTRAVENOUS at 11:06

## 2017-01-01 RX ADMIN — MAGNESIUM SULFATE: 500 INJECTION, SOLUTION INTRAMUSCULAR; INTRAVENOUS at 01:05

## 2017-01-01 RX ADMIN — IPRATROPIUM BROMIDE AND ALBUTEROL SULFATE 3 ML: .5; 3 SOLUTION RESPIRATORY (INHALATION) at 07:11

## 2017-01-01 RX ADMIN — ETOPOSIDE 140 MG: 20 INJECTION, SOLUTION, CONCENTRATE INTRAVENOUS at 03:03

## 2017-01-01 RX ADMIN — DEXAMETHASONE SODIUM PHOSPHATE 0.25 MG: 4 INJECTION, SOLUTION INTRAMUSCULAR; INTRAVENOUS at 01:03

## 2017-01-01 RX ADMIN — SODIUM CHLORIDE, PRESERVATIVE FREE 10 ML: 5 INJECTION INTRAVENOUS at 03:05

## 2017-01-01 RX ADMIN — ETOPOSIDE 138 MG: 20 INJECTION INTRAVENOUS at 09:04

## 2017-01-01 RX ADMIN — MIDAZOLAM HYDROCHLORIDE 1 MG: 1 INJECTION, SOLUTION INTRAMUSCULAR; INTRAVENOUS at 09:03

## 2017-01-01 RX ADMIN — SODIUM CHLORIDE, PRESERVATIVE FREE 500 UNITS: 5 INJECTION INTRAVENOUS at 11:04

## 2017-01-01 RX ADMIN — CLONIDINE HYDROCHLORIDE 0.1 MG: 0.1 TABLET ORAL at 01:06

## 2017-01-01 RX ADMIN — ACETAMINOPHEN 650 MG: 325 TABLET ORAL at 03:11

## 2017-01-01 RX ADMIN — MIDAZOLAM HYDROCHLORIDE 0.5 MG: 1 INJECTION, SOLUTION INTRAMUSCULAR; INTRAVENOUS at 09:03

## 2017-01-01 RX ADMIN — FENTANYL CITRATE 50 MCG: 50 INJECTION, SOLUTION INTRAMUSCULAR; INTRAVENOUS at 09:03

## 2017-01-01 RX ADMIN — MAGNESIUM SULFATE: 500 INJECTION, SOLUTION INTRAMUSCULAR; INTRAVENOUS at 08:05

## 2017-01-01 RX ADMIN — ETOPOSIDE 150 MG: 20 INJECTION, SOLUTION, CONCENTRATE INTRAVENOUS at 09:02

## 2017-01-01 RX ADMIN — SODIUM CHLORIDE, PRESERVATIVE FREE 10 ML: 5 INJECTION INTRAVENOUS at 05:03

## 2017-01-01 RX ADMIN — SODIUM CHLORIDE, PRESERVATIVE FREE 10 ML: 5 INJECTION INTRAVENOUS at 11:04

## 2017-01-01 RX ADMIN — ETOPOSIDE 138 MG: 20 INJECTION, SOLUTION, CONCENTRATE INTRAVENOUS at 10:05

## 2017-01-01 RX ADMIN — HEPARIN 500 UNITS: 100 SYRINGE at 05:03

## 2017-01-01 RX ADMIN — PEGFILGRASTIM 6 MG: 6 INJECTION SUBCUTANEOUS at 08:05

## 2017-01-01 RX ADMIN — CISPLATIN 104 MG: 1 INJECTION, SOLUTION INTRAVENOUS at 02:03

## 2017-01-01 RX ADMIN — SODIUM CHLORIDE 150 MG: 9 INJECTION, SOLUTION INTRAVENOUS at 01:05

## 2017-01-01 RX ADMIN — ETOPOSIDE 138 MG: 20 INJECTION, SOLUTION, CONCENTRATE INTRAVENOUS at 12:06

## 2017-01-01 RX ADMIN — SODIUM CHLORIDE: 0.9 INJECTION, SOLUTION INTRAVENOUS at 03:03

## 2017-01-01 RX ADMIN — CISPLATIN 104 MG: 100 INJECTION, SOLUTION INTRAVENOUS at 11:06

## 2017-01-01 RX ADMIN — ETOPOSIDE 138 MG: 20 INJECTION, SOLUTION, CONCENTRATE INTRAVENOUS at 02:04

## 2017-01-01 RX ADMIN — ETOPOSIDE 138 MG: 20 INJECTION, SOLUTION, CONCENTRATE INTRAVENOUS at 09:05

## 2017-01-01 RX ADMIN — CISPLATIN 104 MG: 1 INJECTION, SOLUTION INTRAVENOUS at 01:04

## 2017-01-01 RX ADMIN — PROCHLORPERAZINE EDISYLATE 10 MG: 5 INJECTION INTRAMUSCULAR; INTRAVENOUS at 08:02

## 2017-01-01 RX ADMIN — CISPLATIN 104 MG: 1 INJECTION, SOLUTION INTRAVENOUS at 02:05

## 2017-01-01 RX ADMIN — SODIUM CHLORIDE: 0.9 INJECTION, SOLUTION INTRAVENOUS at 08:04

## 2017-01-01 RX ADMIN — MAGNESIUM SULFATE: 500 INJECTION, SOLUTION INTRAMUSCULAR; INTRAVENOUS at 01:03

## 2017-01-01 RX ADMIN — HEPARIN 500 UNITS: 100 SYRINGE at 11:06

## 2017-01-01 RX ADMIN — FENTANYL CITRATE 25 MCG: 50 INJECTION, SOLUTION INTRAMUSCULAR; INTRAVENOUS at 09:03

## 2017-01-01 RX ADMIN — SODIUM CHLORIDE: 9 INJECTION, SOLUTION INTRAVENOUS at 08:02

## 2017-01-01 RX ADMIN — ETOPOSIDE 150 MG: 20 INJECTION, SOLUTION, CONCENTRATE INTRAVENOUS at 08:02

## 2017-01-01 RX ADMIN — HYDRALAZINE HYDROCHLORIDE 10 MG: 20 INJECTION INTRAMUSCULAR; INTRAVENOUS at 10:11

## 2017-01-01 RX ADMIN — PEGFILGRASTIM 6 MG: 6 INJECTION SUBCUTANEOUS at 10:06

## 2017-01-01 RX ADMIN — SODIUM CHLORIDE: 9 INJECTION, SOLUTION INTRAVENOUS at 09:06

## 2017-01-01 RX ADMIN — SODIUM CHLORIDE: 9 INJECTION, SOLUTION INTRAVENOUS at 04:03

## 2017-01-01 RX ADMIN — ETOPOSIDE 150 MG: 20 INJECTION, SOLUTION, CONCENTRATE INTRAVENOUS at 04:03

## 2017-01-01 RX ADMIN — MAGNESIUM SULFATE HEPTAHYDRATE: 500 INJECTION, SOLUTION INTRAMUSCULAR; INTRAVENOUS at 10:02

## 2017-01-01 RX ADMIN — SODIUM CHLORIDE: 0.9 INJECTION, SOLUTION INTRAVENOUS at 02:11

## 2017-01-01 RX ADMIN — IOHEXOL 15 ML: 350 INJECTION, SOLUTION INTRAVENOUS at 02:11

## 2017-01-01 RX ADMIN — HEPARIN 500 UNITS: 100 SYRINGE at 01:06

## 2017-01-01 RX ADMIN — GADOBUTROL 5 ML: 604.72 INJECTION INTRAVENOUS at 03:11

## 2017-01-01 RX ADMIN — SODIUM CHLORIDE 1000 ML: 0.9 INJECTION, SOLUTION INTRAVENOUS at 04:05

## 2017-01-01 RX ADMIN — SODIUM CHLORIDE, PRESERVATIVE FREE 10 ML: 5 INJECTION INTRAVENOUS at 01:05

## 2017-01-01 RX ADMIN — IOHEXOL 75 ML: 350 INJECTION, SOLUTION INTRAVENOUS at 04:11

## 2017-01-01 RX ADMIN — LOSARTAN POTASSIUM AND HYDROCHLOROTHIAZIDE 1 TABLET: 12.5; 1 TABLET ORAL at 09:11

## 2017-01-01 RX ADMIN — HEPARIN 500 UNITS: 100 SYRINGE at 04:06

## 2017-01-01 RX ADMIN — CISPLATIN 113 MG: 1 INJECTION, SOLUTION INTRAVENOUS at 12:02

## 2017-01-01 RX ADMIN — CLONIDINE HYDROCHLORIDE 0.1 MG: 0.1 TABLET ORAL at 12:06

## 2017-01-01 RX ADMIN — CISPLATIN 104 MG: 100 INJECTION, SOLUTION INTRAVENOUS at 11:05

## 2017-01-01 RX ADMIN — SODIUM CHLORIDE, PRESERVATIVE FREE 10 ML: 5 INJECTION INTRAVENOUS at 11:05

## 2017-01-01 RX ADMIN — SODIUM CHLORIDE, PRESERVATIVE FREE 10 ML: 5 INJECTION INTRAVENOUS at 04:06

## 2017-01-01 RX ADMIN — PEGFILGRASTIM 6 MG: 6 INJECTION SUBCUTANEOUS at 10:03

## 2017-01-01 RX ADMIN — HEPARIN 500 UNITS: 100 SYRINGE at 05:05

## 2017-01-01 RX ADMIN — HEPARIN 300 UNITS: 100 SYRINGE at 10:11

## 2017-01-01 RX ADMIN — IOHEXOL 15 ML: 350 INJECTION, SOLUTION INTRAVENOUS at 03:11

## 2017-01-01 RX ADMIN — HEPARIN 500 UNITS: 100 SYRINGE at 11:05

## 2017-01-01 RX ADMIN — MAGNESIUM SULFATE: 500 INJECTION, SOLUTION INTRAMUSCULAR; INTRAVENOUS at 03:04

## 2017-01-01 RX ADMIN — SODIUM CHLORIDE, PRESERVATIVE FREE 10 ML: 5 INJECTION INTRAVENOUS at 11:06

## 2017-01-01 RX ADMIN — ACETAMINOPHEN 650 MG: 325 TABLET ORAL at 08:11

## 2017-01-01 RX ADMIN — ETOPOSIDE 138 MG: 20 INJECTION, SOLUTION, CONCENTRATE INTRAVENOUS at 12:05

## 2017-01-01 RX ADMIN — MAGNESIUM SULFATE HEPTAHYDRATE: 500 INJECTION, SOLUTION INTRAMUSCULAR; INTRAVENOUS at 03:02

## 2017-01-01 RX ADMIN — PEGFILGRASTIM 6 MG: 6 INJECTION SUBCUTANEOUS at 09:02

## 2017-01-01 RX ADMIN — HEPARIN 500 UNITS: 100 SYRINGE at 09:05

## 2017-01-01 RX ADMIN — HEPARIN 500 UNITS: 100 SYRINGE at 03:05

## 2017-01-01 RX ADMIN — SODIUM CHLORIDE 150 MG: 9 INJECTION, SOLUTION INTRAVENOUS at 01:03

## 2017-01-01 RX ADMIN — HEPARIN SODIUM 5000 UNITS: 5000 INJECTION, SOLUTION INTRAVENOUS; SUBCUTANEOUS at 10:11

## 2017-01-01 RX ADMIN — MAGNESIUM SULFATE: 500 INJECTION, SOLUTION INTRAMUSCULAR; INTRAVENOUS at 09:06

## 2017-01-01 RX ADMIN — ETOPOSIDE 138 MG: 20 INJECTION, SOLUTION, CONCENTRATE INTRAVENOUS at 10:04

## 2017-01-01 RX ADMIN — ETOPOSIDE 138 MG: 20 INJECTION, SOLUTION, CONCENTRATE INTRAVENOUS at 10:06

## 2017-01-01 RX ADMIN — LOSARTAN POTASSIUM AND HYDROCHLOROTHIAZIDE 1 TABLET: 12.5; 1 TABLET ORAL at 08:11

## 2017-01-01 RX ADMIN — MAGNESIUM SULFATE: 500 INJECTION, SOLUTION INTRAMUSCULAR; INTRAVENOUS at 03:03

## 2017-01-01 RX ADMIN — SODIUM CHLORIDE 150 MG: 9 INJECTION, SOLUTION INTRAVENOUS at 12:02

## 2017-01-01 RX ADMIN — SODIUM CHLORIDE, PRESERVATIVE FREE 10 ML: 5 INJECTION INTRAVENOUS at 05:04

## 2017-01-01 RX ADMIN — SODIUM CHLORIDE, PRESERVATIVE FREE 500 UNITS: 5 INJECTION INTRAVENOUS at 05:04

## 2017-01-01 RX ADMIN — SODIUM CHLORIDE, PRESERVATIVE FREE 10 ML: 5 INJECTION INTRAVENOUS at 05:05

## 2017-01-01 RX ADMIN — INSULIN ASPART 2 UNITS: 100 INJECTION, SOLUTION INTRAVENOUS; SUBCUTANEOUS at 06:11

## 2017-01-01 RX ADMIN — DEXAMETHASONE SODIUM PHOSPHATE 0.25 MG: 4 INJECTION, SOLUTION INTRAMUSCULAR; INTRAVENOUS at 01:05

## 2017-01-01 RX ADMIN — PREDNISONE 10 MG: 10 TABLET ORAL at 12:11

## 2017-01-01 RX ADMIN — MAGNESIUM SULFATE: 500 INJECTION, SOLUTION INTRAMUSCULAR; INTRAVENOUS at 11:04

## 2017-01-01 RX ADMIN — DEXAMETHASONE SODIUM PHOSPHATE 0.25 MG: 4 INJECTION, SOLUTION INTRAMUSCULAR; INTRAVENOUS at 11:02

## 2017-01-01 RX ADMIN — PEGFILGRASTIM 6 MG: 6 INJECTION SUBCUTANEOUS at 09:04

## 2017-01-01 RX ADMIN — ETOPOSIDE 138 MG: 20 INJECTION, SOLUTION, CONCENTRATE INTRAVENOUS at 08:05

## 2017-01-01 RX ADMIN — ETOPOSIDE 138 MG: 20 INJECTION, SOLUTION, CONCENTRATE INTRAVENOUS at 03:05

## 2017-01-01 RX ADMIN — SODIUM CHLORIDE: 0.9 INJECTION, SOLUTION INTRAVENOUS at 09:05

## 2017-01-01 RX ADMIN — ETOPOSIDE 150 MG: 20 INJECTION, SOLUTION INTRAVENOUS at 01:02

## 2017-01-01 RX ADMIN — CEFAZOLIN SODIUM 1 G: 1 SOLUTION INTRAVENOUS at 08:03

## 2017-01-01 RX ADMIN — MAGNESIUM SULFATE: 500 INJECTION, SOLUTION INTRAMUSCULAR; INTRAVENOUS at 11:05

## 2017-01-01 RX ADMIN — DEXAMETHASONE SODIUM PHOSPHATE: 4 INJECTION, SOLUTION INTRAMUSCULAR; INTRAVENOUS at 11:05

## 2017-01-01 RX ADMIN — CLONIDINE HYDROCHLORIDE 0.1 MG: 0.1 TABLET ORAL at 11:05

## 2017-01-01 RX ADMIN — HYDRALAZINE HYDROCHLORIDE 10 MG: 20 INJECTION INTRAMUSCULAR; INTRAVENOUS at 06:11

## 2017-01-01 RX ADMIN — SODIUM CHLORIDE 500 ML: 0.9 INJECTION, SOLUTION INTRAVENOUS at 08:03

## 2017-01-01 RX ADMIN — SODIUM CHLORIDE: 0.9 INJECTION, SOLUTION INTRAVENOUS at 08:02

## 2017-01-01 RX ADMIN — DEXAMETHASONE SODIUM PHOSPHATE 0.25 MG: 4 INJECTION, SOLUTION INTRAMUSCULAR; INTRAVENOUS at 01:04

## 2017-01-01 RX ADMIN — HEPARIN 500 UNITS: 100 SYRINGE at 01:05

## 2017-01-01 RX ADMIN — CLONIDINE HYDROCHLORIDE 0.1 MG: 0.1 TABLET ORAL at 08:06

## 2017-01-09 ENCOUNTER — HOSPITAL ENCOUNTER (EMERGENCY)
Facility: OTHER | Age: 62
Discharge: HOME | End: 2017-01-09
Attending: EMERGENCY MEDICINE
Payer: MEDICARE

## 2017-01-09 VITALS
BODY MASS INDEX: 20.98 KG/M2 | SYSTOLIC BLOOD PRESSURE: 130 MMHG | WEIGHT: 114 LBS | HEIGHT: 62 IN | DIASTOLIC BLOOD PRESSURE: 77 MMHG | OXYGEN SATURATION: 99 % | HEART RATE: 92 BPM | RESPIRATION RATE: 20 BRPM | TEMPERATURE: 98 F

## 2017-01-09 DIAGNOSIS — R07.9 CHEST PAIN: ICD-10-CM

## 2017-01-09 DIAGNOSIS — J18.9 PNEUMONIA OF LEFT UPPER LOBE DUE TO INFECTIOUS ORGANISM: Primary | ICD-10-CM

## 2017-01-09 LAB
ANION GAP SERPL CALC-SCNC: 14 MMOL/L
BASOPHILS # BLD AUTO: 0.03 K/UL
BASOPHILS NFR BLD: 0.3 %
BUN SERPL-MCNC: 20 MG/DL
CALCIUM SERPL-MCNC: 10.6 MG/DL
CHLORIDE SERPL-SCNC: 104 MMOL/L
CO2 SERPL-SCNC: 23 MMOL/L
CREAT SERPL-MCNC: 1.2 MG/DL
DIFFERENTIAL METHOD: NORMAL
EOSINOPHIL # BLD AUTO: 0.1 K/UL
EOSINOPHIL NFR BLD: 0.7 %
ERYTHROCYTE [DISTWIDTH] IN BLOOD BY AUTOMATED COUNT: 14.4 %
EST. GFR  (AFRICAN AMERICAN): 57 ML/MIN/1.73 M^2
EST. GFR  (NON AFRICAN AMERICAN): 50 ML/MIN/1.73 M^2
GLUCOSE SERPL-MCNC: 141 MG/DL
HCT VFR BLD AUTO: 44.1 %
HGB BLD-MCNC: 14.4 G/DL
LYMPHOCYTES # BLD AUTO: 2.4 K/UL
LYMPHOCYTES NFR BLD: 25.9 %
MCH RBC QN AUTO: 27.5 PG
MCHC RBC AUTO-ENTMCNC: 32.7 %
MCV RBC AUTO: 84 FL
MONOCYTES # BLD AUTO: 0.5 K/UL
MONOCYTES NFR BLD: 4.9 %
NEUTROPHILS # BLD AUTO: 6.4 K/UL
NEUTROPHILS NFR BLD: 68 %
PLATELET # BLD AUTO: 248 K/UL
PMV BLD AUTO: 10.3 FL
POTASSIUM SERPL-SCNC: 4.3 MMOL/L
RBC # BLD AUTO: 5.23 M/UL
SODIUM SERPL-SCNC: 141 MMOL/L
TROPONIN I SERPL DL<=0.01 NG/ML-MCNC: <0.006 NG/ML
WBC # BLD AUTO: 9.42 K/UL

## 2017-01-09 PROCEDURE — 25000003 PHARM REV CODE 250: Performed by: EMERGENCY MEDICINE

## 2017-01-09 PROCEDURE — 93010 ELECTROCARDIOGRAM REPORT: CPT | Mod: ,,, | Performed by: INTERNAL MEDICINE

## 2017-01-09 PROCEDURE — 84484 ASSAY OF TROPONIN QUANT: CPT

## 2017-01-09 PROCEDURE — 85025 COMPLETE CBC W/AUTO DIFF WBC: CPT

## 2017-01-09 PROCEDURE — 99284 EMERGENCY DEPT VISIT MOD MDM: CPT

## 2017-01-09 PROCEDURE — 80048 BASIC METABOLIC PNL TOTAL CA: CPT

## 2017-01-09 RX ORDER — IBUPROFEN 600 MG/1
600 TABLET ORAL
Status: COMPLETED | OUTPATIENT
Start: 2017-01-09 | End: 2017-01-09

## 2017-01-09 RX ORDER — ACETAMINOPHEN 325 MG/1
650 TABLET ORAL
Status: COMPLETED | OUTPATIENT
Start: 2017-01-09 | End: 2017-01-09

## 2017-01-09 RX ORDER — MOXIFLOXACIN HYDROCHLORIDE 400 MG/1
400 TABLET ORAL DAILY
Qty: 10 TABLET | Refills: 0 | Status: SHIPPED | OUTPATIENT
Start: 2017-01-09 | End: 2017-01-19

## 2017-01-09 RX ORDER — IBUPROFEN 600 MG/1
600 TABLET ORAL EVERY 6 HOURS PRN
Qty: 20 TABLET | Refills: 0 | Status: SHIPPED | OUTPATIENT
Start: 2017-01-09 | End: 2017-02-03

## 2017-01-09 RX ORDER — MOXIFLOXACIN HYDROCHLORIDE 400 MG/1
400 TABLET ORAL
Status: COMPLETED | OUTPATIENT
Start: 2017-01-09 | End: 2017-01-09

## 2017-01-09 RX ADMIN — ACETAMINOPHEN 650 MG: 325 TABLET ORAL at 08:01

## 2017-01-09 RX ADMIN — MOXIFLOXACIN HYDROCHLORIDE 400 MG: 400 TABLET, FILM COATED ORAL at 09:01

## 2017-01-09 RX ADMIN — IBUPROFEN 600 MG: 600 TABLET, FILM COATED ORAL at 08:01

## 2017-01-09 NOTE — ED AVS SNAPSHOT
[facility]  [address]  [facility] [address]               Licha Pop   2017  7:20 PM   ED    Description:  Female : 1955   Department:  [facility]           Your Care was Coordinated By:     Provider Role From To    Skye Gray MD Attending Provider 17 [phone] --      Reason for Visit     URI           Diagnoses this Visit        Comments    Pneumonia of left upper lobe due to infectious organism    -  Primary     Chest pain           ED Disposition     None           To Do List           Follow-up Information     Schedule an appointment as soon as possible for a visit with Jayden John MD.    Specialty:  Orthopedic Surgery    Why:  in 3-5 days    Contact information:    [address]  [facility] [address]  [phone]         These Medications        Disp Refills Start End    ibuprofen (ADVIL,MOTRIN) 600 MG tablet 20 tablet 0 2017     Take 1 tablet (600 mg total) by mouth every 6 (six) hours as needed for Pain. - Oral    moxifloxacin (AVELOX) 400 mg tablet 10 tablet 0 2017    Take 1 tablet (400 mg total) by mouth once daily. - Oral      [facility]     [facility] Nurse Care Line -  Assistance  Registered nurses in the [facility] provide clinical advisement, health education, appointment booking, and other advisory services.  Call for this free service at [phone].             Medications           Message regarding Medications     Verify the changes and/or additions to your medication regime listed below are the same as discussed with your clinician today.  If any of these changes or additions are incorrect, please notify your healthcare provider.        START taking these NEW medications        Refills    moxifloxacin (AVELOX) 400 mg tablet 0    Sig: Take 1 tablet (400 mg total) by mouth once daily.    Class: Print    Route: Oral      These medications  "were administered today        Dose Freq    ibuprofen tablet 600 mg 600 mg ED 1 Time    Sig: Take 1 tablet (600 mg total) by mouth ED 1 Time.    Class: Normal    Route: Oral    acetaminophen tablet 650 mg 650 mg ED 1 Time    Sig: Take 2 tablets (650 mg total) by mouth ED 1 Time.    Class: Normal    Route: Oral    moxifloxacin tablet 400 mg 400 mg ED 1 Time    Sig: Take 1 tablet (400 mg total) by mouth ED 1 Time.    Class: Normal    Route: Oral           Verify that the below list of medications is an accurate representation of the medications you are currently taking.  If none reported, the list may be blank. If incorrect, please contact your healthcare provider. Carry this list with you in case of emergency.           Current Medications     hydrocodone-acetaminophen 5-325mg (NORCO) 5-325 mg per tablet Take 1 tablet by mouth every 6 (six) hours as needed for Pain.    ibuprofen (ADVIL,MOTRIN) 600 MG tablet Take 1 tablet (600 mg total) by mouth every 6 (six) hours as needed for Pain.    insulin aspart protamine-insulin aspart (NOVOLOG 70/30) 100 unit/mL (70-30) InPn pen Inject 5 Units into the skin before meals and at bedtime as needed.    METFORMIN HCL (METFORMIN ORAL) Take by mouth 2 (two) times daily.    moxifloxacin (AVELOX) 400 mg tablet Take 1 tablet (400 mg total) by mouth once daily.    moxifloxacin tablet 400 mg Take 1 tablet (400 mg total) by mouth ED 1 Time.    valsartan-hydrochlorothiazide (DIOVAN-HCT) 160-12.5 mg per tablet Take 1 tablet by mouth once daily.           Clinical Reference Information           Your Vitals Were     BP Pulse Temp Resp Height Weight    127/76 94 97.6 °F (36.4 °C) (Oral) 20 5' 2" (1.575 m) 51.7 kg (114 lb)    SpO2 BMI             99% 20.85 kg/m2         Allergies as of 1/9/2017     No Known Allergies      Immunizations Administered on Date of Encounter - 1/9/2017     None      ED Micro, Lab, POCT     Start Ordered       Status Ordering Provider    01/09/17 1952 01/09/17 1952  " CBC auto differential  STAT      Final result     01/09/17 1952 01/09/17 1952  Basic metabolic panel  STAT      Final result     01/09/17 1952 01/09/17 1952  Troponin I  STAT      Final result       ED Imaging Orders     Start Ordered       Status Ordering Provider    01/09/17 1953 01/09/17 1952  X-Ray Chest PA And Lateral  1 time imaging      Final result       Discharge References/Attachments     ADULT, PNEUMONIA (ENGLISH)      [facility] Sign-Up     Activating your [facility] account is as easy as 1-2-3!     1) Visit [email or web address], select Sign Up Now, enter this activation code and your date of birth, then select Next.  [email or web address]  Expires: 2/23/2017  9:11 PM      2) Create a username and password to use when you visit [facility] in the future and select a security question in case you lose your password and select Next.    3) Enter your e-mail address and click Sign Up!    Additional Information  If you have questions, please e-mail [email or web address] or call [phone] to talk to our [facility] staff. Remember, [facility] is NOT to be used for urgent needs. For medical emergencies, dial 911.         Smoking Cessation     If you would like to quit smoking:   You may be eligible for free services if you are a [place] resident and started smoking cigarettes before September 1, 1988.  Call the Smoking Cessation Trust ([employer]) toll free at (784) 854-4043 or (195) 247-2511.   Call 6-996-QUIT-NOW if you do not meet the above criteria.             [facility] complies with applicable Federal civil rights laws and does not discriminate on the basis of race, color, national origin, age, disability, or sex.        Language Assistance Services     ATTENTION: Language assistance services are available, free of charge. Please call [phone].      ATENCIÓN: Si habla español, tiene a portillo disposición servicios gratuitos de asistencia lingüística. Llame al [phone].     CHÚ Ý: N?u b?n nói  Ti?ng Vi?t, có các d?ch v? h? tr? ngôn ng? mi?n phí dành cho b?n. G?i s? [phone].

## 2017-01-10 NOTE — ED PROVIDER NOTES
Encounter Date: 1/9/2017    SCRIBE #1 NOTE: I, Allen Cano, am scribing for, and in the presence of, Dr. Gray.       History     Chief Complaint   Patient presents with    URI     with cough, congestion, drainage in her throat and chest pain for about a week     Review of patient's allergies indicates:  No Known Allergies  HPI Comments: Time seen by provider: 7:45 PM    This is a 61 y.o. female who presents to the ED with a chief complaint of left sided CP. The patient reports her pain has been intermittent over the past 2 weeks. She reports her episodes normally last for 30 minutes and improve with rest. She rates her pain at an 8/10 in severity and described as dull. She reports no aspirin or analgesia use since onset. She states it is worse with walking and with lying on her left side. The patient reports it is not worse with coughing. She notes some SOB, diaphoresis, chills, and nausea with the episodes. She states that she is currently SOB, with an onset after being roomed here in the ED. The patient denies any sore throat or vomiting. No fever. No additional complaints.    Hx of HTN, HLD, and DM reported. She notes no known allergies. No past surgical hx reported. Additional past medical, surgical, and social history as outlined in the nursing assessment was reviewed by me.    The history is provided by the patient.     Past Medical History   Diagnosis Date    Diabetes mellitus     Hypertension      No past medical history pertinent negatives.  History reviewed. No pertinent past surgical history.  No family history on file.  Social History   Substance Use Topics    Smoking status: Former Smoker    Smokeless tobacco: Never Used    Alcohol use No     Review of Systems   Constitutional: Positive for chills and diaphoresis. Negative for fever.   HENT: Positive for ear pain. Negative for congestion, rhinorrhea and sore throat.    Respiratory: Positive for cough and shortness of breath.     Cardiovascular: Positive for chest pain.   Gastrointestinal: Positive for nausea. Negative for abdominal pain, diarrhea and vomiting.   Endocrine: Negative for polyuria.   Genitourinary: Negative for decreased urine volume and dysuria.   Musculoskeletal: Negative for back pain.   Skin: Negative for rash.   Allergic/Immunologic: Negative for immunocompromised state.   Neurological: Negative for dizziness and weakness.   Hematological: Does not bruise/bleed easily.   Psychiatric/Behavioral: Negative for confusion.       Physical Exam   Initial Vitals   BP Pulse Resp Temp SpO2   01/09/17 1900 01/09/17 1900 01/09/17 1900 01/09/17 1900 01/09/17 1900   140/81 95 20 97.6 °F (36.4 °C) 98 %     Physical Exam    Nursing note and vitals reviewed.  Constitutional: She appears well-developed and well-nourished. She is not diaphoretic. No distress.   HENT:   Head: Normocephalic and atraumatic.   Right Ear: External ear normal.   Left Ear: External ear normal.   Eyes: Conjunctivae and EOM are normal. Pupils are equal, round, and reactive to light. Right eye exhibits no discharge. Left eye exhibits no discharge.   Neck: Normal range of motion. Neck supple. No tracheal deviation present.   Cardiovascular: Normal rate, regular rhythm, normal heart sounds and intact distal pulses. Exam reveals no gallop and no friction rub.    No murmur heard.  Pulmonary/Chest: Breath sounds normal. No stridor. No respiratory distress. She has no wheezes. She has no rhonchi. She has no rales.   Abdominal: Soft. There is no tenderness. There is no rebound and no guarding.   Musculoskeletal: Normal range of motion. She exhibits no edema.   Diffuse chest wall TTP, but worse on the left.    Neurological: She is alert and oriented to person, place, and time. She has normal strength. No cranial nerve deficit.   Skin: Skin is warm and dry. No rash noted. No erythema. No pallor.   Psychiatric: She has a normal mood and affect. Her behavior is normal.  Judgment and thought content normal.         ED Course   Procedures  Labs Reviewed   BASIC METABOLIC PANEL - Abnormal; Notable for the following:        Result Value    Glucose 141 (*)     Calcium 10.6 (*)     eGFR if  57 (*)     eGFR if non  50 (*)     All other components within normal limits   CBC W/ AUTO DIFFERENTIAL   TROPONIN I     EKG Readings: (Independently Interpreted)   Normal sinus rhythm. Rate of 100. APB's present. T waver inversions in the lateral leads which are new from EKG in 2011.      Imaging Results         X-Ray Chest PA And Lateral (Final result) Result time:  01/09/17 20:22:56    Final result by Prabhu Welsh MD (01/09/17 20:22:56)    Impression:       New opacity in the left upper lobe on background of chronic interstitial changes.              Electronically signed by: PRABHU WELSH MD  Date:     01/09/17  Time:    20:22     Narrative:    Exam: [id number]  01/09/17  20:12:15 IMG36 ([facility]) : XR CHEST PA AND LATERAL    Technique:    Frontal and lateral chest x-ray    Comparison:    1/12/14    Findings:      The trachea is unremarkable.  The cardiomediastinal silhouette is within normal limits.  The hemidiaphragms are unremarkable.  There are no pleural effusions.  There is no evidence of a pneumothorax.  There is no evidence of pneumomediastinum.  There is an opacity in the left upper lobe.  There are stable chronic interstitial changes.  The osseous structures are within normal limits.                X-Rays:   Independently Interpreted Readings:   Chest X-Ray: Ground glass bilaterally with a hyper density in the left hilum.      Medical Decision Making:   Initial Assessment:   Patient presents with CP. While she attributes her symptoms to recent URI she has multiple risk factors for ACS. I will obtain a troponin level at this time. Because of the ongoing nature of her symptoms I do not believe it is neccessary for serial troponins if the initial is normal.  I will  also obtain a CXR to rule out pneumonia or pneumothorax. I doubt PE. I will give NSAIDs for pain and reassess.   ED Management:  9:09 PM - patient's diagnostic workup reveals pneumonia. Her troponin is normal. She has no respiratory distress and a normal SpO2. I believe she is stable for outpatient management.  Because of her comorbidities I will prescribe a quinolone. I have discussed with patient the diagnostic results, diagnosis, treatment plan, and need for follow-up. Patient has expressed understanding of my instructions. I am comfortable with her discharge home at this time.            Scribe Attestation:   Scribe #1: I performed the above scribed service and the documentation accurately describes the services I performed. I attest to the accuracy of the note.    Attending Attestation:           Physician Attestation for Scribe:  Physician Attestation Statement for Scribe #1: I, Dr. Gray, reviewed documentation, as scribed by Allen Cano in my presence, and it is both accurate and complete.                 ED Course     Clinical Impression:     1. Pneumonia of left upper lobe due to infectious organism    2. Chest pain                Skye Gray MD  01/14/17 [id number]

## 2017-02-03 ENCOUNTER — HOSPITAL ENCOUNTER (INPATIENT)
Facility: OTHER | Age: 62
LOS: 3 days | Discharge: HOME | DRG: 181 | End: 2017-02-06
Attending: EMERGENCY MEDICINE | Admitting: HOSPITALIST
Payer: MEDICARE

## 2017-02-03 DIAGNOSIS — R05.9 COUGH: ICD-10-CM

## 2017-02-03 DIAGNOSIS — R09.02 HYPOXIA: ICD-10-CM

## 2017-02-03 DIAGNOSIS — R91.8 MASS OF LEFT LUNG: ICD-10-CM

## 2017-02-03 DIAGNOSIS — R07.9 RIGHT-SIDED CHEST PAIN: ICD-10-CM

## 2017-02-03 DIAGNOSIS — R06.02 SHORTNESS OF BREATH: Primary | ICD-10-CM

## 2017-02-03 LAB
ALBUMIN SERPL BCP-MCNC: 3.4 G/DL
ALP SERPL-CCNC: 57 U/L
ALT SERPL W/O P-5'-P-CCNC: 8 U/L
ANION GAP SERPL CALC-SCNC: 9 MMOL/L
ANISOCYTOSIS BLD QL SMEAR: SLIGHT
AST SERPL-CCNC: 16 U/L
BASOPHILS NFR BLD: 0 %
BILIRUB SERPL-MCNC: 0.3 MG/DL
BUN SERPL-MCNC: 15 MG/DL
CALCIUM SERPL-MCNC: 9.6 MG/DL
CHLORIDE SERPL-SCNC: 104 MMOL/L
CO2 SERPL-SCNC: 28 MMOL/L
CREAT SERPL-MCNC: 0.9 MG/DL
DIFFERENTIAL METHOD: ABNORMAL
EOSINOPHIL NFR BLD: 2 %
ERYTHROCYTE [DISTWIDTH] IN BLOOD BY AUTOMATED COUNT: 14.9 %
EST. GFR  (AFRICAN AMERICAN): >60 ML/MIN/1.73 M^2
EST. GFR  (NON AFRICAN AMERICAN): >60 ML/MIN/1.73 M^2
FLUAV AG SPEC QL IA: NEGATIVE
FLUBV AG SPEC QL IA: NEGATIVE
GLUCOSE SERPL-MCNC: 140 MG/DL
HCT VFR BLD AUTO: 40.7 %
HGB BLD-MCNC: 13.2 G/DL
LYMPHOCYTES NFR BLD: 31 %
MCH RBC QN AUTO: 27.6 PG
MCHC RBC AUTO-ENTMCNC: 32.4 %
MCV RBC AUTO: 85 FL
MONOCYTES NFR BLD: 5 %
NEUTROPHILS NFR BLD: 62 %
PLATELET # BLD AUTO: 216 K/UL
PLATELET BLD QL SMEAR: ABNORMAL
PMV BLD AUTO: 10.5 FL
POCT GLUCOSE: 163 MG/DL (ref 70–110)
POIKILOCYTOSIS BLD QL SMEAR: SLIGHT
POLYCHROMASIA BLD QL SMEAR: ABNORMAL
POTASSIUM SERPL-SCNC: 3.6 MMOL/L
PROT SERPL-MCNC: 7.8 G/DL
RBC # BLD AUTO: 4.78 M/UL
SODIUM SERPL-SCNC: 141 MMOL/L
SPECIMEN SOURCE: NORMAL
TARGETS BLD QL SMEAR: ABNORMAL
WBC # BLD AUTO: 5.96 K/UL

## 2017-02-03 PROCEDURE — 85025 COMPLETE CBC W/AUTO DIFF WBC: CPT

## 2017-02-03 PROCEDURE — 96361 HYDRATE IV INFUSION ADD-ON: CPT

## 2017-02-03 PROCEDURE — 25000003 PHARM REV CODE 250

## 2017-02-03 PROCEDURE — 99285 EMERGENCY DEPT VISIT HI MDM: CPT | Mod: 25

## 2017-02-03 PROCEDURE — 25500020 PHARM REV CODE 255: Performed by: EMERGENCY MEDICINE

## 2017-02-03 PROCEDURE — 63600175 PHARM REV CODE 636 W HCPCS

## 2017-02-03 PROCEDURE — 80053 COMPREHEN METABOLIC PANEL: CPT

## 2017-02-03 PROCEDURE — 11000001 HC ACUTE MED/SURG PRIVATE ROOM

## 2017-02-03 PROCEDURE — 27000221 HC OXYGEN, UP TO 24 HOURS

## 2017-02-03 PROCEDURE — 25000242 PHARM REV CODE 250 ALT 637 W/ HCPCS

## 2017-02-03 PROCEDURE — 94640 AIRWAY INHALATION TREATMENT: CPT

## 2017-02-03 PROCEDURE — 96360 HYDRATION IV INFUSION INIT: CPT

## 2017-02-03 PROCEDURE — 87400 INFLUENZA A/B EACH AG IA: CPT | Mod: 59

## 2017-02-03 RX ORDER — VALSARTAN 40 MG/1
120 TABLET ORAL DAILY
Status: DISCONTINUED | OUTPATIENT
Start: 2017-02-03 | End: 2017-02-06 | Stop reason: HOSPADM

## 2017-02-03 RX ORDER — ENOXAPARIN SODIUM 100 MG/ML
40 INJECTION SUBCUTANEOUS EVERY 24 HOURS
Status: DISCONTINUED | OUTPATIENT
Start: 2017-02-03 | End: 2017-02-05

## 2017-02-03 RX ORDER — ALBUTEROL SULFATE 0.83 MG/ML
2.5 SOLUTION RESPIRATORY (INHALATION) 3 TIMES DAILY
Status: ON HOLD | COMMUNITY
End: 2017-01-01 | Stop reason: CLARIF

## 2017-02-03 RX ORDER — PREDNISONE 10 MG/1
10 TABLET ORAL DAILY
COMMUNITY
End: 2017-01-01 | Stop reason: SDUPTHER

## 2017-02-03 RX ORDER — METFORMIN HYDROCHLORIDE 500 MG/1
500 TABLET, EXTENDED RELEASE ORAL 2 TIMES DAILY
Status: ON HOLD | COMMUNITY
End: 2017-02-06 | Stop reason: HOSPADM

## 2017-02-03 RX ORDER — ZOLPIDEM TARTRATE 5 MG/1
5 TABLET ORAL NIGHTLY PRN
Status: DISCONTINUED | OUTPATIENT
Start: 2017-02-03 | End: 2017-02-06 | Stop reason: HOSPADM

## 2017-02-03 RX ORDER — ONDANSETRON 2 MG/ML
4 INJECTION INTRAMUSCULAR; INTRAVENOUS EVERY 8 HOURS PRN
Status: DISCONTINUED | OUTPATIENT
Start: 2017-02-03 | End: 2017-02-03

## 2017-02-03 RX ORDER — GLUCAGON 1 MG
1 KIT INJECTION
Status: DISCONTINUED | OUTPATIENT
Start: 2017-02-03 | End: 2017-02-04

## 2017-02-03 RX ORDER — IBUPROFEN 200 MG
16 TABLET ORAL
Status: DISCONTINUED | OUTPATIENT
Start: 2017-02-03 | End: 2017-02-04

## 2017-02-03 RX ORDER — EZETIMIBE 10 MG/1
10 TABLET ORAL DAILY
Status: ON HOLD | COMMUNITY
End: 2017-01-01 | Stop reason: CLARIF

## 2017-02-03 RX ORDER — IPRATROPIUM BROMIDE AND ALBUTEROL SULFATE 2.5; .5 MG/3ML; MG/3ML
3 SOLUTION RESPIRATORY (INHALATION)
Status: DISCONTINUED | OUTPATIENT
Start: 2017-02-03 | End: 2017-02-06 | Stop reason: HOSPADM

## 2017-02-03 RX ORDER — ACETAMINOPHEN 325 MG/1
650 TABLET ORAL EVERY 8 HOURS PRN
Status: DISCONTINUED | OUTPATIENT
Start: 2017-02-03 | End: 2017-02-06 | Stop reason: HOSPADM

## 2017-02-03 RX ORDER — IBUPROFEN 200 MG
24 TABLET ORAL
Status: DISCONTINUED | OUTPATIENT
Start: 2017-02-03 | End: 2017-02-04

## 2017-02-03 RX ORDER — INSULIN ASPART 100 [IU]/ML
0-5 INJECTION, SOLUTION INTRAVENOUS; SUBCUTANEOUS
Status: DISCONTINUED | OUTPATIENT
Start: 2017-02-03 | End: 2017-02-04

## 2017-02-03 RX ORDER — ACETAMINOPHEN 500 MG
500 TABLET ORAL NIGHTLY PRN
Status: ON HOLD | COMMUNITY
End: 2018-01-01 | Stop reason: HOSPADM

## 2017-02-03 RX ORDER — HYDROCODONE BITARTRATE AND ACETAMINOPHEN 10; 325 MG/1; MG/1
1 TABLET ORAL EVERY 8 HOURS PRN
Status: ON HOLD | COMMUNITY
End: 2017-02-06

## 2017-02-03 RX ORDER — OXYCODONE HYDROCHLORIDE 5 MG/1
5 TABLET ORAL EVERY 4 HOURS PRN
Status: DISCONTINUED | OUTPATIENT
Start: 2017-02-03 | End: 2017-02-06 | Stop reason: HOSPADM

## 2017-02-03 RX ORDER — SODIUM CHLORIDE 9 MG/ML
INJECTION, SOLUTION INTRAVENOUS CONTINUOUS
Status: DISCONTINUED | OUTPATIENT
Start: 2017-02-03 | End: 2017-02-04

## 2017-02-03 RX ADMIN — IPRATROPIUM BROMIDE AND ALBUTEROL SULFATE 3 ML: .5; 3 SOLUTION RESPIRATORY (INHALATION) at 08:02

## 2017-02-03 RX ADMIN — SODIUM CHLORIDE: 0.9 INJECTION, SOLUTION INTRAVENOUS at 04:02

## 2017-02-03 RX ADMIN — ENOXAPARIN SODIUM 40 MG: 100 INJECTION SUBCUTANEOUS at 08:02

## 2017-02-03 RX ADMIN — VALSARTAN 120 MG: 80 TABLET, FILM COATED ORAL at 02:02

## 2017-02-03 RX ADMIN — OXYCODONE HYDROCHLORIDE 5 MG: 5 TABLET ORAL at 04:02

## 2017-02-03 RX ADMIN — OXYCODONE HYDROCHLORIDE 5 MG: 5 TABLET ORAL at 08:02

## 2017-02-03 RX ADMIN — IOHEXOL 75 ML: 350 INJECTION, SOLUTION INTRAVENOUS at 12:02

## 2017-02-03 NOTE — ED NOTES
Rounding on the pt has been done. Mealtray at bedside and pt eating at this time. Pt AAOx4 and appropriate at this time. Respirations even and unlabored. No acute distress noted. Pt reports pain 0/10. Awaiting further orders. Pt updated on POC. Bed is locked and in lowest position with side rails up x2. Call bell within reach and pt oriented to use of call bell. Pt on continuous pulse ox, and continuous BP cuff. Will continue to monitor.

## 2017-02-03 NOTE — IP AVS SNAPSHOT
[facility] Location ([facility])  [address] [place] [address]  Phone: [phone]           Patient Discharge Instructions     Our goal is to set you up for success. This packet includes information on your condition, medications, and your home care. It will help you to care for yourself so you don't get sicker and need to go back to the hospital.     Please ask your nurse if you have any questions.        There are many details to remember when preparing to leave the hospital. Here is what you will need to do:    1. Take your medicine. If you are prescribed medications, review your Medication List in the following pages. You may have new medications to  at the pharmacy and others that you'll need to stop taking. Review the instructions for how and when to take your medications. Talk with your doctor or nurses if you are unsure of what to do.     2. Go to your follow-up appointments. Specific follow-up information is listed in the following pages. Your may be contacted by a transition nurse or clinical provider about future appointments. Be sure we have all of the phone numbers to reach you, if needed. Please contact your provider's office if you are unable to make an appointment.     3. Watch for warning signs. Your doctor or nurse will give you detailed warning signs to watch for and when to call for assistance. These instructions may also include educational information about your condition. If you experience any of warning signs to your health, call your doctor.               ** Verify the list of medication(s) below is accurate and up to date. Carry this with you in case of emergency. If your medications have changed, please notify your healthcare provider.             Medication List      CHANGE how you take these medications        Additional Info                      hydrocodone-acetaminophen 10-325mg  mg Tab   Commonly known as:  NORCO   Quantity:  30 tablet   Refills:  0    Dose:  1 tablet   What changed:  Another medication with the same name was removed. Continue taking this medication, and follow the directions you see here.    Instructions:  Take 1 tablet by mouth every 8 (eight) hours as needed for Pain.     Begin Date    AM    Noon    PM    Bedtime         CONTINUE taking these medications        Additional Info                      acetaminophen 500 MG tablet   Commonly known as:  TYLENOL   Refills:  0   Dose:  500 mg    Last time this was given:  650 mg on 2/4/2017  1:14 AM   Instructions:  Take 500 mg by mouth nightly as needed for Pain.     Begin Date    AM    Noon    PM    Bedtime       albuterol 2.5 mg /3 mL (0.083 %) nebulizer solution   Commonly known as:  PROVENTIL   Refills:  0   Dose:  2.5 mg    Instructions:  Take 2.5 mg by nebulization 3 (three) times daily. Rescue     Begin Date    AM    Noon    PM    Bedtime       ezetimibe 10 mg tablet   Commonly known as:  ZETIA   Refills:  0   Dose:  10 mg    Instructions:  Take 10 mg by mouth once daily.     Begin Date    AM    Noon    PM    Bedtime       predniSONE 10 MG tablet   Commonly known as:  DELTASONE   Refills:  0   Dose:  10 mg    Instructions:  Take 10 mg by mouth once daily.     Begin Date    AM    Noon    PM    Bedtime       valsartan-hydrochlorothiazide 160-12.5 mg per tablet   Commonly known as:  DIOVAN-HCT   Refills:  0   Dose:  1 tablet    Instructions:  Take 1 tablet by mouth once daily.     Begin Date    AM    Noon    PM    Bedtime         STOP taking these medications     FLUARIX QUAD 9447-8136 (PF) 60 mcg (15 mcg x 4)/0.5 mL Syrg   Generic drug:  flu vac [id number] 36mos up(PF)       ibuprofen 600 MG tablet   Commonly known as:  ADVIL,MOTRIN       insulin aspart protamine-insulin aspart 100 unit/mL (70-30) Inpn pen   Commonly known as:  NovoLOG 70/30       metformin 500 MG 24 hr tablet   Commonly known as:  GLUCOPHAGE-XR            Where to Get Your Medications      You can get these medications from any  "pharmacy     Bring a paper prescription for each of these medications     hydrocodone-acetaminophen 10-325mg  mg Tab                  Please bring to all follow up appointments:    1. A copy of your discharge instructions.  2. All medicines you are currently taking in their original bottles.  3. Identification and insurance card.    Please arrive 15 minutes ahead of scheduled appointment time.    Please call 24 hours in advance if you must reschedule your appointment and/or time.        Your Scheduled Appointments     Feb 15, 2017 10:00 AM CST   Established Patient Visit with Jann Live Jr., MD   [facility] ([facility])    [address]  [facility] [address] [phone]              Follow-up Information     Follow up with Jayden John MD.    Specialty:  Orthopedic Surgery    Contact information:    [address]  [facility] [id number] [phone]          Follow up with Jann Live Jr, MD.    Specialty:  Radiation Oncology    Why:  Outpatient Services-    Contact information:    [address]  [facility] [address]  [phone]          Discharge Instructions     Future Orders    Activity as tolerated     CANE FOR HOME USE     Questions:    Type of Cane:  Narrow Quad    Height:  5' 4" (1.626 m)    Weight:  49.9 kg (110 lb)    Does patient have medical equipment at home?:  none    Length of need (1-99 months):  99    Please check all that apply:  Patient's condition impairs ambulation.    Diet general     Questions:    Total calories:      Fat restriction, if any:      Protein restriction, if any:      Na restriction, if any:      Fluid restriction:      Additional restrictions:      Diet general     Questions:    Total calories:      Fat restriction, if any:      Protein restriction, if any:      Na restriction, if any:      Fluid restriction:      Additional restrictions:      OXYGEN FOR HOME USE     Questions:    Liter Flow:  2    Duration:  Continuous " "   Qualifying SpO2:  86% 2-6-17 4pm    Testing done at:  [facility]    Route:  nasal cannula    Portable mode:  pulse dose acceptable    Device:  home concentrator with portable unit    Length of need (in months):  99 mos    Patient condition with qualifying saturation:   Comment - lung cancer    Height:  5' 4" (1.626 m)    Weight:  49.9 kg (110 lb)    Does patient have medical equipment at home?:  none    Alternative treatment measures have been tried or considered and deemed clinically ineffective.:  Yes        Primary Diagnosis     Your primary diagnosis was:  Mass Of Left Lung      Admission Information     Date & Time Provider Department CSN    2/3/2017  9:35 AM Allegra Carrizales MD [facility] [id number]      Care Providers     Provider Role Specialty Primary office phone    Allegra Carrizales MD Attending Provider Internal Medicine [phone]    iWllie Chirinos III, MD Consulting Physician  Pulmonary Disease [phone]    Arnold Leavitt MD Consulting Physician  Radiation Oncology [phone]    Issa Lovett MD Surgeon  Radiology [phone]    Jann Live Jr., MD Consulting Physician  Radiation Oncology  [phone]      Important Medicare Message          Most Recent Value    Important Message from Medicare Regarding Discharge Appeal Rights  Given to patient/caregiver, Explained to patient/caregiver, Signed/date by patient/caregiver yes 02/06/2017 1534      Your Vitals Were     BP Pulse Temp Resp Height Weight    121/68 (BP Location: Right arm, Patient Position: Lying, BP Method: Automatic) 83 97.8 °F (36.6 °C) (Oral) 16 5' 4" (1.626 m) 49.9 kg (110 lb)    Last Period SpO2 BMI          (LMP Unknown) 100% 18.88 kg/m2        Recent Lab Values     No lab values to display.      Pending Labs     Order Current Status    Angiotensin converting enzyme In process    Anti-neutrophilic cytoplasmic antibody In process    Tissue Specimen to Pathology In process      Allergies as of " 2/6/2017     No Known Allergies      [facility]     [facility] Care [facility] - 24/7 Assistance  Unless otherwise directed by your provider, please contact [facility], our nurse care line that is available for 24/7 assistance.     Registered nurses in the [facility] provide clinical advisement, health education, appointment booking, and other advisory services.  Call for this free service at [phone].        Advance Directives     An advance directive is a document which, in the event you are no longer able to make decisions for yourself, tells your healthcare team what kind of treatment you do or do not want to receive, or who you would like to make those decisions for you.  If you do not currently have an advance directive, [facility] encourages you to create one.  For more information call:  [phone]), [phone] ([phone]),  or log on to [email or web address].        Smoking Cessation     If you would like to quit smoking:   You may be eligible for free services if you are a [place] resident and started smoking cigarettes before September 1, 1988.  Call the Smoking Cessation Trust ([facility]) toll free at (687) 857-0199 or (556) 567-4443.   Call 9-211-QUIT-NOW if you do not meet the above criteria.            Language Assistance Services     ATTENTION: Language assistance services are available, free of charge. Please call [phone].      ATENCIÓN: Si habla español, tiene a potrillo disposición servicios gratuitos de asistencia lingüística. Llame al [phone].     [facility] Ý: N?u b?n nói Ti?ng Vi?t, có các d?ch v? h? tr? ngôn ng? mi?n phí dành cho b?n. G?i s? [phone].        [facility] Sign-Up     Activating your [facility] account is as easy as 1-2-3!     1) Visit [email or web address], select Sign Up Now, enter this activation code and your date of birth, then select Next.  [email or web address]  Expires: 2/23/2017  9:11 PM      2) Create a username and password  to use when you visit [facility] in the future and select a security question in case you lose your password and select Next.    3) Enter your e-mail address and click Sign Up!    Additional Information  If you have questions, please e-mail [email or web address] or call [phone] to talk to our [facility] staff. Remember, [facility] is NOT to be used for urgent needs. For medical emergencies, dial 911.          [facility] complies with applicable Federal civil rights laws and does not discriminate on the basis of race, color, national origin, age, disability, or sex.

## 2017-02-03 NOTE — ED PROVIDER NOTES
"Encounter Date: 2/3/2017    SCRIBE #1 NOTE: I, Holly Brandt, am scribing for, and in the presence of, Dr. Espinosa.       History     Chief Complaint   Patient presents with    Cough     Patient c/o a dry cough for "awhile".  Patient c/o right rib pain when she coughs, nausea and vomiting. Patient denies fevers but stated she does "break into sweats" sometimes.      Review of patient's allergies indicates:  No Known Allergies  HPI Comments: Time seen by provider: 10:37 AM    This is a 61 y.o. female who presents with complaint of nonproductive cough that began yesterday. She complains of associated shortness of breath nasal congestion, and right-sided rib pain. The rib pain is only present with coughing. She reports sick contacts with her granddaughter.     The history is provided by the patient.     Past Medical History   Diagnosis Date    Diabetes mellitus     Hypertension      No past medical history pertinent negatives.  History reviewed. No pertinent past surgical history.  History reviewed. No pertinent family history.  Social History   Substance Use Topics    Smoking status: Former Smoker    Smokeless tobacco: Never Used    Alcohol use No     Review of Systems   Constitutional: Negative for chills and fever.   HENT: Positive for congestion.    Respiratory: Positive for shortness of breath.    Cardiovascular: Negative for chest pain.   Gastrointestinal: Negative for abdominal pain, nausea and vomiting.   Endocrine: Negative for polyuria.   Genitourinary: Negative for dysuria.   Musculoskeletal: Negative for myalgias.        Positive for right-sided rib pain.    Skin: Negative for rash.   Neurological: Negative for headaches.       Physical Exam   Initial Vitals   BP Pulse Resp Temp SpO2   02/03/17 0931 02/03/17 0931 02/03/17 0931 02/03/17 0931 02/03/17 0931   141/81 80 12 97.5 °F (36.4 °C) 95 %     Physical Exam    Nursing note and vitals reviewed.  Constitutional: She appears well-developed and " well-nourished. She is not diaphoretic. No distress.   HENT:   Head: Normocephalic and atraumatic.   Mouth/Throat: Oropharynx is clear and moist.   Eyes: Conjunctivae and EOM are normal. Pupils are equal, round, and reactive to light. No scleral icterus.   Neck: Normal range of motion. Neck supple.   Cardiovascular: Normal rate, regular rhythm, S1 normal, S2 normal and normal heart sounds. Exam reveals no gallop and no friction rub.    No murmur heard.  Pulmonary/Chest: Breath sounds normal. No respiratory distress. She has no wheezes. She has no rhonchi. She has no rales.   Abdominal: Soft. Bowel sounds are normal. There is no tenderness. There is no rebound and no guarding.   Musculoskeletal: Normal range of motion. She exhibits no edema or tenderness.   No lower extremity edema.    Lymphadenopathy:     She has no cervical adenopathy.   Neurological: She is alert and oriented to person, place, and time.   Skin: Skin is warm and dry. No rash noted. No pallor.   Psychiatric: She has a normal mood and affect. Her behavior is normal. Judgment and thought content normal.         ED Course   Procedures  Labs Reviewed   CBC W/ AUTO DIFFERENTIAL - Abnormal; Notable for the following:        Result Value    RDW 14.9 (*)     All other components within normal limits   COMPREHENSIVE METABOLIC PANEL - Abnormal; Notable for the following:     Glucose 140 (*)     Albumin 3.4 (*)     ALT 8 (*)     All other components within normal limits   INFLUENZA A AND B ANTIGEN        Imaging Results         CT Chest With Contrast (Final result) Result time:  02/03/17 13:06:58    Final result by Berenice Shi MD (02/03/17 13:06:58)    Narrative:    75 cc Omnipaque 350 intravenous contrast was administered for this examination.  A comparison radiograph from earlier today is utilized.  No prior CT.    The most significant abnormality on today's examination correlates with the findings on recent chest radiograph.  Within the left upper  lobe abutting the posterior chest wall is a 3.7 x 3.7 cm mass.  Within the left hemithorax superiorly medially and involving the mediastinum is a 7.6 x 5.8 cm soft tissue density mass.  The left superior pulmonary vein is narrowed by the mass which also abuts the aorta and pulmonary artery and surrounds left upper lobe arterial branches.  There is left supraclavicular lymphadenopathy and probable smaller lymph node in the right supraclavicular region.  There is a small right paratracheal lymph node as well as a larger soft tissue density just anterior to the julius and measuring 2.4 x 4.4 cm compatible with lymphadenopathy.   left hilar and subcarinal lymphadenopathy also present.  There is a subcentimeter parenchymal opacity within the left lower lobe.  There is a micronodule within the left upper lobe.    There are changes of emphysema.  There are changes of interstitial lung disease, most predominant in the lower lung zones but fairly diffuse.  There are increased interstitial markings and cystic changes along with honeycombing.    There is no significant pleural effusion.  Small amount of pericardial fluid is present.  Upper abdominal structures are unremarkable.  Osseous structures are intact.     impression: CT appearance of the chest compatible with neoplastic process.  There is a pleural-based left upper lobe mass, large soft tissue mass within the superior medial left hemithorax extending to mediastinum (may represent lymphadenopathy versus primary tumor) as well as multiple foci of lymphadenopathy within supraclavicular regions and chest.  Recommend pulmonary consultation and tissue sampling.    Abnormal appearance of the lungs with changes of interstitial lung disease.          Electronically signed by: Berenice Shi MD  Date:     02/03/17  Time:    13:06             X-Ray Chest PA And Lateral (Final result) Result time:  02/03/17 11:06:31    Final result by Berenice Shi MD (02/03/17 11:06:31)     Impression:     Left upper lobe parenchymal opacity has increased when compared to 01/09/2017 examination.  Although this may relate to untreated infection or atypical infection, neoplastic etiology is also a consideration.        Electronically signed by: Berenice Shi MD  Date:     02/03/17  Time:    11:06     Narrative:    2 views.  Comparison is last month 01/09/2017.    The cardiac size is within normal limits.  There is no pleural effusion.  There are diffusely increased interstitial markings again seen throughout the chest, in this patient with chronic interstitial lung disease.  Persisting within the left upper lobe is a large parenchymal opacity.  This has increased in size when compared to the patient's previous examination.  There is no new abnormality noted.                  X-Rays:   Independently Interpreted Readings:   Chest X-Ray: Left upper lobe parenchymal opacity.      Medical Decision Making:   Clinical Tests:   Lab Tests: Ordered and Reviewed  Radiological Study: Ordered and Reviewed    Additional MDM:   X-Rays: I have independently interpreted X-Ray(s) - see notes.          Scribe Attestation:   Scribe #1: I performed the above scribed service and the documentation accurately describes the services I performed. I attest to the accuracy of the note.    Attending Attestation:           Physician Attestation for Scribe:  Physician Attestation Statement for Scribe #1: I, Dr. Espinosa, reviewed documentation, as scribed by Holly Brandt in my presence, and it is both accurate and complete.         Attending ED Notes:   Emergent evaluation a 61-year-old female with progressively worsening shortness of breath over the past 3-4 weeks.  Patient also complains of intermittent cough.  Patient is afebrile, nontoxic-appearing with stable vital signs except for oxygen saturation of 95% on room air.  Patient has no elevation of white blood cell count.  H&H is 13.2 and 40.7.  No acute findings on CMP.  Influenza  screen is negative.  Chest x-ray reveals left upper lobe opacity that is greater in size than last visualized 1 month ago on x-ray.  CT of the chest reveals left lung mass involving the mediastinum.  Concern for lung cancer.  Patient is extensively counseled on her diagnosis and treatment including all diagnostic, laboratory and physical exam findings.  The patient is admitted in stable condition.      1:15 PM: Discussed the case with Sherwin Venegas PA-C with the hospitalist service, who will admit the patient.           ED Course     Clinical Impression:     1. Shortness of breath    2. Cough    3. Hypoxia    4. Right-sided chest pain    5. Mass of left lung             Yemi Melendez MD  02/03/17 0393

## 2017-02-04 PROBLEM — J84.10 PULMONARY FIBROSIS: Status: ACTIVE | Noted: 2017-02-04

## 2017-02-04 PROBLEM — J44.1 COPD EXACERBATION: Status: ACTIVE | Noted: 2017-02-04

## 2017-02-04 PROBLEM — R51.9 HEADACHE: Status: ACTIVE | Noted: 2017-02-04

## 2017-02-04 LAB
CEA SERPL-MCNC: 23.7 NG/ML
CRP SERPL-MCNC: 16.9 MG/L
ERYTHROCYTE [SEDIMENTATION RATE] IN BLOOD BY WESTERGREN METHOD: 70 MM/HR
POCT GLUCOSE: 122 MG/DL (ref 70–110)
POCT GLUCOSE: 165 MG/DL (ref 70–110)

## 2017-02-04 PROCEDURE — 36415 COLL VENOUS BLD VENIPUNCTURE: CPT

## 2017-02-04 PROCEDURE — 86140 C-REACTIVE PROTEIN: CPT

## 2017-02-04 PROCEDURE — 86038 ANTINUCLEAR ANTIBODIES: CPT

## 2017-02-04 PROCEDURE — 86255 FLUORESCENT ANTIBODY SCREEN: CPT | Mod: 91

## 2017-02-04 PROCEDURE — 25000003 PHARM REV CODE 250: Performed by: HOSPITALIST

## 2017-02-04 PROCEDURE — 63600175 PHARM REV CODE 636 W HCPCS

## 2017-02-04 PROCEDURE — 94640 AIRWAY INHALATION TREATMENT: CPT

## 2017-02-04 PROCEDURE — 63600175 PHARM REV CODE 636 W HCPCS: Performed by: HOSPITALIST

## 2017-02-04 PROCEDURE — 82164 ANGIOTENSIN I ENZYME TEST: CPT

## 2017-02-04 PROCEDURE — 25000242 PHARM REV CODE 250 ALT 637 W/ HCPCS

## 2017-02-04 PROCEDURE — 82378 CARCINOEMBRYONIC ANTIGEN: CPT

## 2017-02-04 PROCEDURE — 99223 1ST HOSP IP/OBS HIGH 75: CPT | Mod: AI,,,

## 2017-02-04 PROCEDURE — 25000003 PHARM REV CODE 250

## 2017-02-04 PROCEDURE — 11000001 HC ACUTE MED/SURG PRIVATE ROOM

## 2017-02-04 PROCEDURE — 25500020 PHARM REV CODE 255: Performed by: HOSPITALIST

## 2017-02-04 PROCEDURE — 27000221 HC OXYGEN, UP TO 24 HOURS

## 2017-02-04 PROCEDURE — 85651 RBC SED RATE NONAUTOMATED: CPT

## 2017-02-04 PROCEDURE — 86431 RHEUMATOID FACTOR QUANT: CPT

## 2017-02-04 RX ADMIN — SODIUM CHLORIDE: 0.9 INJECTION, SOLUTION INTRAVENOUS at 01:02

## 2017-02-04 RX ADMIN — PROMETHAZINE HYDROCHLORIDE 25 MG: 25 INJECTION INTRAMUSCULAR; INTRAVENOUS at 03:02

## 2017-02-04 RX ADMIN — IOHEXOL 75 ML: 350 INJECTION, SOLUTION INTRAVENOUS at 04:02

## 2017-02-04 RX ADMIN — OXYCODONE HYDROCHLORIDE 5 MG: 5 TABLET ORAL at 07:02

## 2017-02-04 RX ADMIN — IPRATROPIUM BROMIDE AND ALBUTEROL SULFATE 3 ML: .5; 3 SOLUTION RESPIRATORY (INHALATION) at 03:02

## 2017-02-04 RX ADMIN — SODIUM CHLORIDE: 0.9 INJECTION, SOLUTION INTRAVENOUS at 08:02

## 2017-02-04 RX ADMIN — VALSARTAN 120 MG: 80 TABLET, FILM COATED ORAL at 08:02

## 2017-02-04 RX ADMIN — IPRATROPIUM BROMIDE AND ALBUTEROL SULFATE 3 ML: .5; 3 SOLUTION RESPIRATORY (INHALATION) at 11:02

## 2017-02-04 RX ADMIN — OXYCODONE HYDROCHLORIDE 5 MG: 5 TABLET ORAL at 01:02

## 2017-02-04 RX ADMIN — ACETAMINOPHEN 650 MG: 325 TABLET ORAL at 01:02

## 2017-02-04 RX ADMIN — ENOXAPARIN SODIUM 40 MG: 100 INJECTION SUBCUTANEOUS at 11:02

## 2017-02-04 RX ADMIN — IPRATROPIUM BROMIDE AND ALBUTEROL SULFATE 3 ML: .5; 3 SOLUTION RESPIRATORY (INHALATION) at 08:02

## 2017-02-04 RX ADMIN — IPRATROPIUM BROMIDE AND ALBUTEROL SULFATE 3 ML: .5; 3 SOLUTION RESPIRATORY (INHALATION) at 07:02

## 2017-02-04 NOTE — CONSULTS
PULMONARY CONSULTATION    HISTORY OF PRESENT ILLNESS:  The patient is a 61-year-old female who presented   with right rib pain.  She has a past history of diabetes and hypertension and   denies any past pulmonary history.  She smoked an unclear amount, but quit at an   unknown time.  She states she has headaches intermittently, but she has had   these for a long time, for which she describes as migraines.  She states she is   short of breath generally easily with ambulation.  She denies any current   productive cough or hemoptysis.  She generally appears to have a poor   performance status and is lying in bed with her eyes closed while she speaks to   me.  She evidently has no pertinent past surgical history.  She is accompanied   by her granddaughter.  She has old x-rays dating back to 2011 that shows a   diffuse interstitial disease, which could be UIP, DIP and collagen vascular   disorder.  These persisted on x-rays in 2014.  She has a chest x-ray from   approximately a month ago that shows interstitial disease and what appears to be   left upper lobe lesion.  X-rays from the ER show a 3.7 x 3.7 cm mass abutting   the left posterior chest wall.  There was also a larger mass involving the   mediastinal 7.6 x 5.8, which is unclear if this is metastatic or another primary   lesion.  She also has diffuse adenopathy in the mediastinum.  She has what   appears to be at least 6-year-old chronic interstitial lung disease with cystic   changes and honeycombing.    PHYSICAL EXAMINATION:  She has marked temporal wasting and marked digital   clubbing.  She has questionable splenohepatomegaly, but no splenomegaly and no   peripheral edema.    LABORATORIES  Show hematocrit of 40.7.  Her alkaline phosphatase is normal.  She   had a negative flu screen.    ASSESSMENT:  This appears to be a primary pulmonary malignancy, perhaps again   left upper chest metastasized a mediastinum or perhaps synchronous tumors   causing this.  She  also has a history of headaches.  So, we want to do some   brain imaging to look for metastasis.  The lesion posteriorly should be amenable   to a needle biopsy, and we get some pulmonary function tests.  We will also do   a CEA level.  This suspects probably to be a primary adenocarcinoma of the lung   in a patient with already impaired pulmonary function from what appears to be a   chronic pulmonary fibrosis.    Thank you for allowing me to participate in her care and we will follow with   you.      LISA/  dd: 02/04/2017 10:58:21 (CST)  td: 02/04/2017 12:43:32 (CST)  Doc ID   #[id number]  Job ID #[id number]    CC:

## 2017-02-04 NOTE — H&P
"[facility] Hospital Medicine  History & Physical    Patient Name: Licha Pop  MRN: [id number]  Admission Date: 2/3/2017  Attending Physician: Allegra Carrizales MD   Primary Care Provider: Jayden John MD         Patient information was obtained from patient and ER records.     Subjective:     Principal Problem:Mass of left lung    Chief Complaint:   Chief Complaint   Patient presents with    Cough     Patient c/o a dry cough for "awhile".  Patient c/o right rib pain when she coughs, nausea and vomiting. Patient denies fevers but stated she does "break into sweats" sometimes.         HPI: 60 yo female with hx of copd, pulmonary fibrosis with weakness, progressive weight loss > 30 lbs.    CT with large left hilar mass  Has been vomiting, not eating/drinking well  Admitted to hydrate, evaluate mass      Past Medical History   Diagnosis Date    Diabetes mellitus     Hypertension        History reviewed. No pertinent past surgical history.    Review of patient's allergies indicates:  No Known Allergies    No current facility-administered medications on file prior to encounter.      Current Outpatient Prescriptions on File Prior to Encounter   Medication Sig    insulin aspart protamine-insulin aspart (NOVOLOG 70/30) 100 unit/mL (70-30) InPn pen Inject 5 Units into the skin before meals and at bedtime as needed.    valsartan-hydrochlorothiazide (DIOVAN-HCT) 160-12.5 mg per tablet Take 1 tablet by mouth once daily.     Family History     None        Social History Main Topics    Smoking status: Former Smoker    Smokeless tobacco: Never Used    Alcohol use No    Drug use: No    Sexual activity: Not on file     Review of Systems   Constitutional: Positive for activity change, appetite change, fatigue and unexpected weight change. Negative for chills and fever.   HENT: Negative for dental problem, ear pain and trouble swallowing.    Eyes: Negative.    Respiratory: Positive for " cough, shortness of breath and wheezing.    Cardiovascular: Negative for chest pain and leg swelling.   Gastrointestinal: Negative for abdominal distention, diarrhea and vomiting.   Endocrine: Negative for polydipsia and polyphagia.   Genitourinary: Negative for dysuria and flank pain.   Musculoskeletal: Negative for arthralgias, myalgias, neck pain and neck stiffness.   Skin: Negative for rash.   Allergic/Immunologic: Negative.    Neurological: Negative for syncope, weakness and headaches.   Psychiatric/Behavioral: Negative for agitation and behavioral problems.   All other systems reviewed and are negative.    Objective:     Vital Signs (Most Recent):  Temp: 98.3 °F (36.8 °C) (02/04/17 1100)  Pulse: 85 (02/04/17 1150)  Resp: 18 (02/04/17 1150)  BP: (!) 162/78 (02/04/17 1100)  SpO2: 98 % (02/04/17 1150) Vital Signs (24h Range):  Temp:  [97.3 °F (36.3 °C)-98.6 °F (37 °C)] 98.3 °F (36.8 °C)  Pulse:  [54-92] 85  Resp:  [16-18] 18  SpO2:  [98 %-100 %] 98 %  BP: (139-177)/(70-90) 162/78     Weight: 49.9 kg (110 lb)  Body mass index is 18.88 kg/(m^2).    Physical Exam   Constitutional: She appears well-developed and well-nourished.  Non-toxic appearance. She does not have a sickly appearance.   HENT:   Head: Normocephalic and atraumatic.   Right Ear: Hearing normal.   Left Ear: Hearing normal.   Mouth/Throat: Oropharynx is clear and moist and mucous membranes are normal.   Eyes: Conjunctivae and EOM are normal. Pupils are equal, round, and reactive to light.   Neck: Normal range of motion. Neck supple. Carotid bruit is not present. No Brudzinski's sign and no Kernig's sign noted.   Cardiovascular: Normal rate, regular rhythm, S1 normal, S2 normal, normal heart sounds, intact distal pulses and normal pulses.  Exam reveals no gallop and no S3.    No murmur heard.  Pulmonary/Chest: Effort normal. No accessory muscle usage. No respiratory distress. She has no wheezes. She has no rales.   Markedly diminished left side    Abdominal: Soft. Normal appearance and bowel sounds are normal. She exhibits no distension and no pulsatile midline mass. There is no hepatosplenomegaly. There is no tenderness.   Musculoskeletal: Normal range of motion.   Neurological: She is alert. She has normal strength and normal reflexes. No cranial nerve deficit or sensory deficit. GCS eye subscore is 4. GCS verbal subscore is 5. GCS motor subscore is 6.   Skin: Skin is warm, dry and intact. No rash noted.   Psychiatric: She has a normal mood and affect. Her speech is normal and behavior is normal. Judgment and thought content normal. Cognition and memory are normal.   Nursing note and vitals reviewed.       Significant Labs: All pertinent labs within the past 24 hours have been reviewed.    Significant Imaging: I have reviewed and interpreted all pertinent imaging results/findings within the past 24 hours.    Assessment/Plan:     * Mass of left lung  Likely malignant  Biopsy monday      VTE Risk Mitigation         Ordered     enoxaparin injection 40 mg  Daily     Route:  Subcutaneous        02/03/17 1751     Medium Risk of VTE  Once      02/03/17 1751        Sherwin Venegas PA-C  Department of Hospital Medicine   [facility]

## 2017-02-04 NOTE — PROGRESS NOTES
No significant events during this shift. AAO x4. VSS on 1.5L NC & afebrile this shift. Non productive cough, GRUBBS. Patient has been sleeping for a majority of the day. Denies pain. Poor appetite, encouraged boost. Ambulates to  with standby assistance. Pt free of falls, trauma and injury. Fall precautions ongoing, side rails x2, bed locked & call light in reach. Purposeful hourly rounding performed. Resting comfortably in low bed with her grand daughter at the bedside.

## 2017-02-04 NOTE — PLAN OF CARE
Problem: Patient Care Overview  Goal: Plan of Care Review  Outcome: Ongoing (interventions implemented as appropriate)  Free from falls, injury, and skin breakdown. VSS with mild hypertension noted. One episode of vomiting overnight, treated with IV phenergan. IV fluids and O2 at 2L/NC maintained. Pain controlled with po meds as ordered. Patient up to bathroom with standby assistance. SOB noted on exertion. Purposeful rounding performed and safety precautions in place and ongoing.

## 2017-02-04 NOTE — SUBJECTIVE & OBJECTIVE
Past Medical History   Diagnosis Date    Diabetes mellitus     Hypertension        History reviewed. No pertinent past surgical history.    Review of patient's allergies indicates:  No Known Allergies    No current facility-administered medications on file prior to encounter.      Current Outpatient Prescriptions on File Prior to Encounter   Medication Sig    insulin aspart protamine-insulin aspart (NOVOLOG 70/30) 100 unit/mL (70-30) InPn pen Inject 5 Units into the skin before meals and at bedtime as needed.    valsartan-hydrochlorothiazide (DIOVAN-HCT) 160-12.5 mg per tablet Take 1 tablet by mouth once daily.     Family History     None        Social History Main Topics    Smoking status: Former Smoker    Smokeless tobacco: Never Used    Alcohol use No    Drug use: No    Sexual activity: Not on file     Review of Systems   Constitutional: Positive for activity change, appetite change, fatigue and unexpected weight change. Negative for chills and fever.   HENT: Negative for dental problem, ear pain and trouble swallowing.    Eyes: Negative.    Respiratory: Positive for cough, shortness of breath and wheezing.    Cardiovascular: Negative for chest pain and leg swelling.   Gastrointestinal: Negative for abdominal distention, diarrhea and vomiting.   Endocrine: Negative for polydipsia and polyphagia.   Genitourinary: Negative for dysuria and flank pain.   Musculoskeletal: Negative for arthralgias, myalgias, neck pain and neck stiffness.   Skin: Negative for rash.   Allergic/Immunologic: Negative.    Neurological: Negative for syncope, weakness and headaches.   Psychiatric/Behavioral: Negative for agitation and behavioral problems.   All other systems reviewed and are negative.    Objective:     Vital Signs (Most Recent):  Temp: 98.3 °F (36.8 °C) (02/04/17 1100)  Pulse: 85 (02/04/17 1150)  Resp: 18 (02/04/17 1150)  BP: (!) 162/78 (02/04/17 1100)  SpO2: 98 % (02/04/17 1150) Vital Signs (24h Range):  Temp:   [97.3 °F (36.3 °C)-98.6 °F (37 °C)] 98.3 °F (36.8 °C)  Pulse:  [54-92] 85  Resp:  [16-18] 18  SpO2:  [98 %-100 %] 98 %  BP: (139-177)/(70-90) 162/78     Weight: 49.9 kg (110 lb)  Body mass index is 18.88 kg/(m^2).    Physical Exam   Constitutional: She appears well-developed and well-nourished.  Non-toxic appearance. She does not have a sickly appearance.   HENT:   Head: Normocephalic and atraumatic.   Right Ear: Hearing normal.   Left Ear: Hearing normal.   Mouth/Throat: Oropharynx is clear and moist and mucous membranes are normal.   Eyes: Conjunctivae and EOM are normal. Pupils are equal, round, and reactive to light.   Neck: Normal range of motion. Neck supple. Carotid bruit is not present. No Brudzinski's sign and no Kernig's sign noted.   Cardiovascular: Normal rate, regular rhythm, S1 normal, S2 normal, normal heart sounds, intact distal pulses and normal pulses.  Exam reveals no gallop and no S3.    No murmur heard.  Pulmonary/Chest: Effort normal. No accessory muscle usage. No respiratory distress. She has no wheezes. She has no rales.   Markedly diminished left side   Abdominal: Soft. Normal appearance and bowel sounds are normal. She exhibits no distension and no pulsatile midline mass. There is no hepatosplenomegaly. There is no tenderness.   Musculoskeletal: Normal range of motion.   Neurological: She is alert. She has normal strength and normal reflexes. No cranial nerve deficit or sensory deficit. GCS eye subscore is 4. GCS verbal subscore is 5. GCS motor subscore is 6.   Skin: Skin is warm, dry and intact. No rash noted.   Psychiatric: She has a normal mood and affect. Her speech is normal and behavior is normal. Judgment and thought content normal. Cognition and memory are normal.   Nursing note and vitals reviewed.       Significant Labs: All pertinent labs within the past 24 hours have been reviewed.    Significant Imaging: I have reviewed and interpreted all pertinent imaging results/findings  within the past 24 hours.

## 2017-02-04 NOTE — CONSULTS
Dictation #1  MRN:[id number]  CSN:[id number]  [id number]  Long standing pulm fibrosis  Likely adenocarcinoma lung with mediastinal spread with perhaps a synchronous lung primary  Weight loss  Headaches  Should be amenable to a needle biopsy  Will get pft-s and would mri head  Check for anca and truong -rf ace to be complete

## 2017-02-04 NOTE — PLAN OF CARE
Problem: Fall Risk (Adult)  Goal: Identify Related Risk Factors and Signs and Symptoms  Related risk factors and signs and symptoms are identified upon initiation of Human Response Clinical Practice Guideline (CPG)   Outcome: Ongoing (interventions implemented as appropriate)  Rx tolerated well, no acute distress noted.

## 2017-02-05 PROCEDURE — 11000001 HC ACUTE MED/SURG PRIVATE ROOM

## 2017-02-05 PROCEDURE — 63600175 PHARM REV CODE 636 W HCPCS

## 2017-02-05 PROCEDURE — 94640 AIRWAY INHALATION TREATMENT: CPT

## 2017-02-05 PROCEDURE — 25000003 PHARM REV CODE 250

## 2017-02-05 PROCEDURE — 27000221 HC OXYGEN, UP TO 24 HOURS

## 2017-02-05 PROCEDURE — 25000242 PHARM REV CODE 250 ALT 637 W/ HCPCS

## 2017-02-05 PROCEDURE — 94760 N-INVAS EAR/PLS OXIMETRY 1: CPT

## 2017-02-05 PROCEDURE — 99233 SBSQ HOSP IP/OBS HIGH 50: CPT | Mod: ,,,

## 2017-02-05 RX ADMIN — OXYCODONE HYDROCHLORIDE 5 MG: 5 TABLET ORAL at 09:02

## 2017-02-05 RX ADMIN — IPRATROPIUM BROMIDE AND ALBUTEROL SULFATE 3 ML: .5; 3 SOLUTION RESPIRATORY (INHALATION) at 07:02

## 2017-02-05 RX ADMIN — ENOXAPARIN SODIUM 40 MG: 100 INJECTION SUBCUTANEOUS at 11:02

## 2017-02-05 RX ADMIN — IPRATROPIUM BROMIDE AND ALBUTEROL SULFATE 3 ML: .5; 3 SOLUTION RESPIRATORY (INHALATION) at 04:02

## 2017-02-05 RX ADMIN — IPRATROPIUM BROMIDE AND ALBUTEROL SULFATE 3 ML: .5; 3 SOLUTION RESPIRATORY (INHALATION) at 11:02

## 2017-02-05 RX ADMIN — OXYCODONE HYDROCHLORIDE 5 MG: 5 TABLET ORAL at 04:02

## 2017-02-05 RX ADMIN — VALSARTAN 120 MG: 80 TABLET, FILM COATED ORAL at 08:02

## 2017-02-05 RX ADMIN — OXYCODONE HYDROCHLORIDE 5 MG: 5 TABLET ORAL at 10:02

## 2017-02-05 NOTE — PLAN OF CARE
Problem: Patient Care Overview  Goal: Plan of Care Review  Outcome: Ongoing (interventions implemented as appropriate)  Patient in no apparent distress. Sat's  95 % on room air. Aerosol treatments given Q4 wa . Will continue to monitor.

## 2017-02-05 NOTE — PLAN OF CARE
Problem: Patient Care Overview  Goal: Plan of Care Review  Outcome: Ongoing (interventions implemented as appropriate)  Pt received on 1.5 lpm NC, SPC with small, thick, white secretions, neb given without event. DARRIANN

## 2017-02-05 NOTE — PROGRESS NOTES
No significant events during this shift. Patient has been more interactive and involved in care today. AAO x4. VSS on 0.5L NC & afebrile this shift. Non productive cough, GRUBBS. Denies pain. Appetite has been better today, but still on the poor side, encouraged boost and offered different meal choices. Ambulates to RR with standby assistance. Pt free of falls, trauma and injury. Fall precautions ongoing, side rails x2, bed locked & call light in reach. Purposeful hourly rounding performed. Resting comfortably in low bed.

## 2017-02-05 NOTE — PLAN OF CARE
Problem: Patient Care Overview  Goal: Plan of Care Review  Outcome: Ongoing (interventions implemented as appropriate)  Pt received on 0.5LNC with adequate saturation. Fair, congested cough noted. Duoneb treatments given, tolerating well. Pt doesnot wear home oxygen. Pt reports taking albuterol 3 times a day at home. No current changes to respiratory orders.

## 2017-02-05 NOTE — SUBJECTIVE & OBJECTIVE
Interval History:     Review of Systems   Constitutional: Positive for activity change, appetite change, fatigue and unexpected weight change. Negative for chills and fever.   HENT: Negative for dental problem, ear pain and trouble swallowing.    Eyes: Negative.    Respiratory: Positive for cough, shortness of breath and wheezing.    Cardiovascular: Negative for chest pain and leg swelling.   Gastrointestinal: Negative for abdominal distention, diarrhea and vomiting.   Endocrine: Negative for polydipsia and polyphagia.   Genitourinary: Negative for dysuria and flank pain.   Musculoskeletal: Negative for arthralgias, myalgias, neck pain and neck stiffness.   Skin: Negative for rash.   Allergic/Immunologic: Negative.    Neurological: Negative for syncope, weakness and headaches.   Psychiatric/Behavioral: Negative for agitation and behavioral problems.   All other systems reviewed and are negative.    Objective:     Vital Signs (Most Recent):  Temp: 99 °F (37.2 °C) (02/05/17 1632)  Pulse: 78 (02/05/17 1632)  Resp: 18 (02/05/17 1615)  BP: (!) 144/78 (02/05/17 1632)  SpO2: 99 % (02/05/17 1632) Vital Signs (24h Range):  Temp:  [98 °F (36.7 °C)-99 °F (37.2 °C)] 99 °F (37.2 °C)  Pulse:  [] 78  Resp:  [18-24] 18  SpO2:  [95 %-99 %] 99 %  BP: (131-172)/(66-84) 144/78     Weight: 49.9 kg (110 lb)  Body mass index is 18.88 kg/(m^2).    Intake/Output Summary (Last 24 hours) at 02/05/17 1650  Last data filed at 02/05/17 1100   Gross per 24 hour   Intake             1320 ml   Output             1300 ml   Net               20 ml      Physical Exam   Constitutional: She appears well-developed and well-nourished.  Non-toxic appearance. She does not have a sickly appearance.   HENT:   Head: Normocephalic and atraumatic.   Right Ear: Hearing normal.   Left Ear: Hearing normal.   Mouth/Throat: Oropharynx is clear and moist and mucous membranes are normal.   Eyes: Conjunctivae and EOM are normal. Pupils are equal, round, and  reactive to light.   Neck: Normal range of motion. Neck supple. Carotid bruit is not present. No Brudzinski's sign and no Kernig's sign noted.   Cardiovascular: Normal rate, regular rhythm, S1 normal, S2 normal, normal heart sounds, intact distal pulses and normal pulses.  Exam reveals no gallop and no S3.    No murmur heard.  Pulmonary/Chest: Effort normal. No accessory muscle usage. No respiratory distress. She has no wheezes. She has no rales.   Markedly diminished left side   Abdominal: Soft. Normal appearance and bowel sounds are normal. She exhibits no distension and no pulsatile midline mass. There is no hepatosplenomegaly. There is no tenderness.   Musculoskeletal: Normal range of motion.   Neurological: She is alert. She has normal strength and normal reflexes. No cranial nerve deficit or sensory deficit. GCS eye subscore is 4. GCS verbal subscore is 5. GCS motor subscore is 6.   Skin: Skin is warm, dry and intact. No rash noted.   Psychiatric: She has a normal mood and affect. Her speech is normal and behavior is normal. Judgment and thought content normal. Cognition and memory are normal.   Nursing note and vitals reviewed.      Significant Labs: All pertinent labs within the past 24 hours have been reviewed.    Significant Imaging: I have reviewed and interpreted all pertinent imaging results/findings within the past 24 hours.

## 2017-02-05 NOTE — PLAN OF CARE
Problem: Patient Care Overview  Goal: Plan of Care Review  Outcome: Ongoing (interventions implemented as appropriate)  Vs stable. medicated x 2 for c/o of right head and right anterior flank pain with relief. Am care done per self this am. SOB noted with ambulation from bathroom. Productive cough.using o2 at 1.5 liters.

## 2017-02-05 NOTE — PLAN OF CARE
neal (son) [phone]  Scotty (son) [phone]  Alan (son) [phone]  -----------------------------   ATTN: TEAM   DC PLANNING      RECOMMENDATION QUAD  CANE AND  HOME HEALTH ( RN SKILLED )   IF MD TEAM AGREES - PENDING MD TEAM TO COMPLETE DME AND HHC ORDER IN EPIC     Jovita Darden RN  Case management 2/5/20175:05 PM  # [phone] (FAX) [phone]     02/05/17 1701   Discharge Assessment   Assessment Type Discharge Planning Assessment   Confirmed/corrected address and phone number on facesheet? Yes   Assessment information obtained from? Patient   Prior to hospitilization cognitive status: Alert/Oriented   Prior to hospitalization functional status: Independent   Current cognitive status: Alert/Oriented   Current Functional Status: Independent   Lives With child(kevin), adult   Able to Return to Prior Arrangements yes   Is patient able to care for self after discharge? Yes   Patient's perception of discharge disposition admitted as an inpatient   Patient currently being followed by outpatient case management? No   Patient currently receives home health services? No   Does the patient currently use HME? No   Patient currently receives private duty nursing? N/A   Patient currently receives any other outside agency services? No   Equipment Currently Used at Home none   Do you have any problems affording any of your prescribed medications? TBD   Is the patient taking medications as prescribed? yes   Do you have any financial concerns preventing you from receiving the healthcare you need? No   Does the patient have transportation to healthcare appointments? No   On Dialysis? No   Does the patient receive services at the [facility]mad[facility]? No   Are there any open cases? No   Discharge Plan A Home Health   Discharge Plan B Home with family   Patient/Family In Agreement With Plan yes

## 2017-02-05 NOTE — PROGRESS NOTES
Progress Note  Pulmonology    Admit Date: 2/3/2017   LOS: 2 days     SUBJECTIVE:   About same- still with headaches - ct negative- pt relates today she knows she has had pulmonary scarring for a number of years  Continuous Infusions:     Scheduled Meds:   albuterol-ipratropium 2.5mg-0.5mg/3mL  3 mL Nebulization Q4H WAKE    enoxaparin  40 mg Subcutaneous Daily    valsartan  120 mg Oral Daily       Review of Systems:  Constitutional: positive for weight loss  Respiratory: positive for dyspnea on exertion  Cardiovascular: no chest pain or palpitations      OBJECTIVE:     Vital Signs Range (Last 24H):  Temp:  [98.1 °F (36.7 °C)-99.1 °F (37.3 °C)]   Pulse:  []   Resp:  [18-24]   BP: (131-172)/(66-84)   SpO2:  [94 %-99 %]     I & O (Last 24H):  Intake/Output Summary (Last 24 hours) at 02/05/17 1351  Last data filed at 02/05/17 1100   Gross per 24 hour   Intake             1320 ml   Output             1300 ml   Net               20 ml       Physical Exam:  General: no distress, appears older than stated age, cachectic  Neck: no jugular venous distention  Lungs:  diminished breath sounds bibasilar  Chest Wall: no tenderness  Heart: regular rate and rhythm and no murmur  Abdomen: soft, non-tender non-distended; bowel sounds normal  Extremities: clubbing    Laboratory:  CBC:   Recent Labs  Lab 02/03/17  1137   WBC 5.96   RBC 4.78   HGB 13.2   HCT 40.7      MCV 85   MCH 27.6   MCHC 32.4     BMP:   Recent Labs  Lab 02/03/17  1137   *      K 3.6      CO2 28   BUN 15   CREATININE 0.9   CALCIUM 9.6     CMP:   Recent Labs  Lab 02/03/17  1137   *   CALCIUM 9.6   ALBUMIN 3.4*   PROT 7.8      K 3.6   CO2 28      BUN 15   CREATININE 0.9   ALKPHOS 57   ALT 8*   AST 16   BILITOT 0.3     Coagulation: No results for input(s): INR, APTT in the last 168 hours.    Invalid input(s): PT  ABGs: No results for input(s): PH, PCO2, PO2, HCO3, POCSATURATED, BE in the last 168  hours.        Diagnostic Results:  CT: Reviewed  X-Ray: Reviewed    ASSESSMENT/PLAN:     Lung mass- likely adenocarcinoma- lung bx for tissue diagnosis and genetic testing  Headaches-ct neg nets  pulm fibrosis- check pft  Case Discussed With:    Discharge Needs and Plans:

## 2017-02-05 NOTE — PROGRESS NOTES
[facility] Hospital Medicine  Progress Note    Patient Name: Licha Pop  MRN: [id number]  Patient Class: IP- Inpatient   Admission Date: 2/3/2017  Length of Stay: 2 days  Attending Physician: Allegra Carrizales MD  Primary Care Provider: Jayden John MD        Subjective:     Principal Problem:Mass of left lung    HPI:  60 yo female with hx of copd, pulmonary fibrosis with weakness, progressive weight loss > 30 lbs.    CT with large left hilar mass  Has been vomiting, not eating/drinking well  Admitted to hydrate, evaluate mass      Hospital Course:  Somewhat better after hydration, more alert, pain controlled    Interval History:     Review of Systems   Constitutional: Positive for activity change, appetite change, fatigue and unexpected weight change. Negative for chills and fever.   HENT: Negative for dental problem, ear pain and trouble swallowing.    Eyes: Negative.    Respiratory: Positive for cough, shortness of breath and wheezing.    Cardiovascular: Negative for chest pain and leg swelling.   Gastrointestinal: Negative for abdominal distention, diarrhea and vomiting.   Endocrine: Negative for polydipsia and polyphagia.   Genitourinary: Negative for dysuria and flank pain.   Musculoskeletal: Negative for arthralgias, myalgias, neck pain and neck stiffness.   Skin: Negative for rash.   Allergic/Immunologic: Negative.    Neurological: Negative for syncope, weakness and headaches.   Psychiatric/Behavioral: Negative for agitation and behavioral problems.   All other systems reviewed and are negative.    Objective:     Vital Signs (Most Recent):  Temp: 99 °F (37.2 °C) (02/05/17 1632)  Pulse: 78 (02/05/17 1632)  Resp: 18 (02/05/17 1615)  BP: (!) 144/78 (02/05/17 1632)  SpO2: 99 % (02/05/17 1632) Vital Signs (24h Range):  Temp:  [98 °F (36.7 °C)-99 °F (37.2 °C)] 99 °F (37.2 °C)  Pulse:  [] 78  Resp:  [18-24] 18  SpO2:  [95 %-99 %] 99 %  BP: (131-172)/(66-84) 144/78      Weight: 49.9 kg (110 lb)  Body mass index is 18.88 kg/(m^2).    Intake/Output Summary (Last 24 hours) at 02/05/17 1650  Last data filed at 02/05/17 1100   Gross per 24 hour   Intake             1320 ml   Output             1300 ml   Net               20 ml      Physical Exam   Constitutional: She appears well-developed and well-nourished.  Non-toxic appearance. She does not have a sickly appearance.   HENT:   Head: Normocephalic and atraumatic.   Right Ear: Hearing normal.   Left Ear: Hearing normal.   Mouth/Throat: Oropharynx is clear and moist and mucous membranes are normal.   Eyes: Conjunctivae and EOM are normal. Pupils are equal, round, and reactive to light.   Neck: Normal range of motion. Neck supple. Carotid bruit is not present. No Brudzinski's sign and no Kernig's sign noted.   Cardiovascular: Normal rate, regular rhythm, S1 normal, S2 normal, normal heart sounds, intact distal pulses and normal pulses.  Exam reveals no gallop and no S3.    No murmur heard.  Pulmonary/Chest: Effort normal. No accessory muscle usage. No respiratory distress. She has no wheezes. She has no rales.   Markedly diminished left side   Abdominal: Soft. Normal appearance and bowel sounds are normal. She exhibits no distension and no pulsatile midline mass. There is no hepatosplenomegaly. There is no tenderness.   Musculoskeletal: Normal range of motion.   Neurological: She is alert. She has normal strength and normal reflexes. No cranial nerve deficit or sensory deficit. GCS eye subscore is 4. GCS verbal subscore is 5. GCS motor subscore is 6.   Skin: Skin is warm, dry and intact. No rash noted.   Psychiatric: She has a normal mood and affect. Her speech is normal and behavior is normal. Judgment and thought content normal. Cognition and memory are normal.   Nursing note and vitals reviewed.      Significant Labs: All pertinent labs within the past 24 hours have been reviewed.    Significant Imaging: I have reviewed and  interpreted all pertinent imaging results/findings within the past 24 hours.    Assessment/Plan:      * Mass of left lung  Likely malignant  Biopsy Monday  Mets noted  Poor prognosis  Discussed with family      COPD exacerbation        Headache  No mets on ct brain      VTE Risk Mitigation         Ordered     Medium Risk of VTE  Once      02/03/17 3211          Sherwin Venegas PA-C  Department of Hospital Medicine   [facility]

## 2017-02-06 VITALS
OXYGEN SATURATION: 100 % | HEIGHT: 64 IN | SYSTOLIC BLOOD PRESSURE: 121 MMHG | TEMPERATURE: 98 F | DIASTOLIC BLOOD PRESSURE: 68 MMHG | HEART RATE: 83 BPM | WEIGHT: 110 LBS | RESPIRATION RATE: 16 BRPM | BODY MASS INDEX: 18.78 KG/M2

## 2017-02-06 LAB
ALBUMIN SERPL BCP-MCNC: 3.1 G/DL
ALP SERPL-CCNC: 52 U/L
ALT SERPL W/O P-5'-P-CCNC: 11 U/L
ANA SER QL IF: NORMAL
ANION GAP SERPL CALC-SCNC: 14 MMOL/L
AST SERPL-CCNC: 19 U/L
BASOPHILS # BLD AUTO: 0.02 K/UL
BASOPHILS NFR BLD: 0.3 %
BILIRUB SERPL-MCNC: 0.3 MG/DL
BUN SERPL-MCNC: 8 MG/DL
CALCIUM SERPL-MCNC: 9.3 MG/DL
CHLORIDE SERPL-SCNC: 101 MMOL/L
CO2 SERPL-SCNC: 23 MMOL/L
CREAT SERPL-MCNC: 0.8 MG/DL
DIFFERENTIAL METHOD: ABNORMAL
EOSINOPHIL # BLD AUTO: 0.2 K/UL
EOSINOPHIL NFR BLD: 2.7 %
ERYTHROCYTE [DISTWIDTH] IN BLOOD BY AUTOMATED COUNT: 14.4 %
EST. GFR  (AFRICAN AMERICAN): >60 ML/MIN/1.73 M^2
EST. GFR  (NON AFRICAN AMERICAN): >60 ML/MIN/1.73 M^2
GLUCOSE SERPL-MCNC: 137 MG/DL
HCT VFR BLD AUTO: 36.4 %
HGB BLD-MCNC: 11.6 G/DL
INR PPP: 1
LYMPHOCYTES # BLD AUTO: 1.5 K/UL
LYMPHOCYTES NFR BLD: 22.3 %
MCH RBC QN AUTO: 27.3 PG
MCHC RBC AUTO-ENTMCNC: 31.9 %
MCV RBC AUTO: 86 FL
MONOCYTES # BLD AUTO: 0.4 K/UL
MONOCYTES NFR BLD: 6.4 %
NEUTROPHILS # BLD AUTO: 4.6 K/UL
NEUTROPHILS NFR BLD: 68.2 %
PLATELET # BLD AUTO: 206 K/UL
PMV BLD AUTO: 10.1 FL
POTASSIUM SERPL-SCNC: 4.6 MMOL/L
PROT SERPL-MCNC: 7.1 G/DL
PROTHROMBIN TIME: 10.7 SEC
RBC # BLD AUTO: 4.25 M/UL
RHEUMATOID FACT SERPL-ACNC: <10 IU/ML
SODIUM SERPL-SCNC: 138 MMOL/L
WBC # BLD AUTO: 6.76 K/UL

## 2017-02-06 PROCEDURE — 85610 PROTHROMBIN TIME: CPT

## 2017-02-06 PROCEDURE — 94727 GAS DIL/WSHOT DETER LNG VOL: CPT

## 2017-02-06 PROCEDURE — 88172 CYTP DX EVAL FNA 1ST EA SITE: CPT | Mod: 26,,, | Performed by: PATHOLOGY

## 2017-02-06 PROCEDURE — 94640 AIRWAY INHALATION TREATMENT: CPT

## 2017-02-06 PROCEDURE — 85025 COMPLETE CBC W/AUTO DIFF WBC: CPT

## 2017-02-06 PROCEDURE — 25000003 PHARM REV CODE 250

## 2017-02-06 PROCEDURE — 27201068 HC S MONOPTY BIOPSY SET: Performed by: RADIOLOGY

## 2017-02-06 PROCEDURE — 88305 TISSUE EXAM BY PATHOLOGIST: CPT | Mod: 26,,, | Performed by: PATHOLOGY

## 2017-02-06 PROCEDURE — 88313 SPECIAL STAINS GROUP 2: CPT | Mod: 26,,, | Performed by: PATHOLOGY

## 2017-02-06 PROCEDURE — 94729 DIFFUSING CAPACITY: CPT

## 2017-02-06 PROCEDURE — 88305 TISSUE EXAM BY PATHOLOGIST: CPT | Performed by: PATHOLOGY

## 2017-02-06 PROCEDURE — 0BBG3ZX EXCISION OF LEFT UPPER LUNG LOBE, PERCUTANEOUS APPROACH, DIAGNOSTIC: ICD-10-PCS | Performed by: RADIOLOGY

## 2017-02-06 PROCEDURE — 88342 IMHCHEM/IMCYTCHM 1ST ANTB: CPT | Mod: 26,,, | Performed by: PATHOLOGY

## 2017-02-06 PROCEDURE — 99239 HOSP IP/OBS DSCHRG MGMT >30: CPT | Mod: ,,,

## 2017-02-06 PROCEDURE — 63600175 PHARM REV CODE 636 W HCPCS: Performed by: RADIOLOGY

## 2017-02-06 PROCEDURE — 94060 EVALUATION OF WHEEZING: CPT

## 2017-02-06 PROCEDURE — 25000242 PHARM REV CODE 250 ALT 637 W/ HCPCS

## 2017-02-06 PROCEDURE — 36415 COLL VENOUS BLD VENIPUNCTURE: CPT

## 2017-02-06 PROCEDURE — 80053 COMPREHEN METABOLIC PANEL: CPT

## 2017-02-06 PROCEDURE — 99222 1ST HOSP IP/OBS MODERATE 55: CPT | Mod: ,,, | Performed by: RADIOLOGY

## 2017-02-06 PROCEDURE — 88341 IMHCHEM/IMCYTCHM EA ADD ANTB: CPT | Mod: 26,,, | Performed by: PATHOLOGY

## 2017-02-06 RX ORDER — FENTANYL CITRATE 50 UG/ML
INJECTION, SOLUTION INTRAMUSCULAR; INTRAVENOUS
Status: DISCONTINUED | OUTPATIENT
Start: 2017-02-06 | End: 2017-02-06 | Stop reason: HOSPADM

## 2017-02-06 RX ORDER — MIDAZOLAM HYDROCHLORIDE 1 MG/ML
INJECTION INTRAMUSCULAR; INTRAVENOUS
Status: DISCONTINUED | OUTPATIENT
Start: 2017-02-06 | End: 2017-02-06 | Stop reason: HOSPADM

## 2017-02-06 RX ORDER — HYDROCODONE BITARTRATE AND ACETAMINOPHEN 10; 325 MG/1; MG/1
1 TABLET ORAL EVERY 8 HOURS PRN
Qty: 30 TABLET | Refills: 0 | Status: ON HOLD | OUTPATIENT
Start: 2017-02-06 | End: 2017-01-01 | Stop reason: CLARIF

## 2017-02-06 RX ADMIN — VALSARTAN 120 MG: 80 TABLET, FILM COATED ORAL at 08:02

## 2017-02-06 RX ADMIN — OXYCODONE HYDROCHLORIDE 5 MG: 5 TABLET ORAL at 08:02

## 2017-02-06 RX ADMIN — IPRATROPIUM BROMIDE AND ALBUTEROL SULFATE 3 ML: .5; 3 SOLUTION RESPIRATORY (INHALATION) at 07:02

## 2017-02-06 NOTE — CONSULTS
Inpatient consult to Radiation Oncology  Consult performed by: ANNA FLORES JR  Consult ordered by: KALEB MELCHOR         Reason for Consultation:  Lt. sided pulmonary mass, suspected lung cancer.     HPI:   Ms. Pop is a 62 yo women who presented to the ED on 2/3/17 with complaints of increasing cough.  Her PMHx is significant for COPD, pulmonary fibrosis, HTN and diabetes.  Of note she was last seen in the ED on 1/9/17 with Lt. chest wall pain for two weeks.  CXR revealed chronic changes with a new opacity in the Lt. lung.  The patient was treated for possible pneumonia.  She represented with complaints of cough.  Further questioning also revealed anorexia with a 30 lb weight loss and weakness.  The patient states she was still smoking some at home.  Denied home O2 but was using a nebulizer three times a day.  CT of the chest on 2/3/17 revealed a 3.7 x 3.7 cm mass in the Lt. upper lobe abutting the posterior chest wall.  There was also a 7.6 x 5.8 cm soft tissue involving the mediastinum. The left superior pulmonary vein was narrowed by the mass.  There was left supraclavicular lymphadenopathy and probable smaller lymph node in the right supraclavicular region.  There is a small right paratracheal lymph node as well as a larger soft tissue density just anterior to the julius and measuring 2.4 x 4.4 cm compatible with lymphadenopathy.  Left hilar and subcarinal lymphadenopathy was also present.  There was a subcentimeter parenchymal opacity within the left lower lobe and a micronodule within the left upper lobe.  There were changes of emphysema and interstitial lung disease.  CT of the head was negative.  The patient subsequently underwent CT guided biopsy of the Lt. upper lobe mass earlier today.      Today, the patient states she feels fairly well.  Denies SOB or cough.      Past Medical History   Diagnosis Date    Diabetes mellitus      Hypertension        No pertinent past surgical  history.  No Known Allergies       Current Outpatient Prescriptions on File Prior to Encounter   Medication Sig    insulin aspart protamine-insulin aspart (NOVOLOG 70/30) 100 unit/mL (70-30) InPn pen Inject 5 Units into the skin before meals and at bedtime as needed.    valsartan-hydrochlorothiazide (DIOVAN-HCT) 160-12.5 mg per tablet Take 1 tablet by mouth once daily.          Family History      None               Social History Main Topics    Smoking status: Former Smoker    Smokeless tobacco: Never Used    Alcohol use No    Drug use: No    Sexual activity: Not on file      Review of Systems   Constitutional: Positive for activity change, appetite change, fatigue and unexpected weight change. Negative for chills and fever.   HENT: Negative for dental problem, ear pain and trouble swallowing.   Eyes: Negative.   Respiratory: Positive for cough, shortness of breath and wheezing.   Cardiovascular: Negative for chest pain and leg swelling.   Gastrointestinal: Negative for abdominal distention, diarrhea and vomiting.   Endocrine: Negative for polydipsia and polyphagia.   Genitourinary: Negative for dysuria and flank pain.   Musculoskeletal: Negative for arthralgias, myalgias, neck pain and neck stiffness.   Skin: Negative for rash.   Allergic/Immunologic: Negative.   Neurological: Negative for syncope, weakness and headaches.   Psychiatric/Behavioral: Negative for agitation and behavioral problems.   All other systems reviewed and are negative.     Objective:             Weight: 49.9 kg (110 lb)  Body mass index is 18.88 kg/(m^2).     Physical Exam   Constitutional: She appears well-developed, thin in NAD.   Head: Normocephalic and atraumatic.   Mouth/Throat: Oropharynx is clear and moist and mucous membranes are normal.   Eyes: Conjunctivae and EOM are normal. Pupils are equal, round, and reactive to light.   Neck: Normal range of motion. Neck supple. Carotid bruit is not present. No Brudzinski's sign and no  Kernig's sign noted.   Pulmonary/Chest: Effort normal. No accessory muscle usage. No respiratory distress. She has no wheezes. She has no rales.   Markedly diminished left side   Abdominal: Soft. Normal appearance and bowel sounds are normal. She exhibits no distension and no pulsatile midline mass. There is no hepatosplenomegaly. TPsychiatric: She has a normal mood and affect. Her speech is normal and behavior is normal. Judgment and thought content normal. Cognition and memory are normal.   Nursing note and vitals reviewed.            Assessment/Plan:      Mass of left lung     I had a long discussion with the patient.  Explained it is very possible that the mass represents a lung cancer.  Explained the serious nature of this disease.  We discussed the possible treatments for lung cancer.  Explained she is not a surgical candidate at this time.  Discussed the role of chemoradiotherapy in this setting.  Briefly discussed the procedures, risks and benefits of therapy.  Explained we will plan follow up in the clinic next week.  We will then have her biopsy results and can discuss further management.  Will plan clinic visit with us and medical oncology

## 2017-02-06 NOTE — PLAN OF CARE
Problem: Patient Care Overview  Goal: Plan of Care Review  Outcome: Ongoing (interventions implemented as appropriate)  Pt free of trauma, falls, and injury. Pt VSS and afebrile throughout shift. Pt free of skin breakdown. Pt pain managed with PO meds and tolerated well. Pt is on 0.5L OF O2. Pt had one boost during the shift. Pt vomited X1. Up to the RR with assist standby. Purposeful rounding done. Pt has call light in reach, bed alarm on, bed brakes on, side rails up x2, bed in low position, and nonskid socks on. Pt lying in bed in no distress. Will continue to monitor.

## 2017-02-06 NOTE — PLAN OF CARE
02/06/17 1638   Discharge Reassessment   Assessment Type Discharge Planning Reassessment   Can the patient answer the patient profile reliably? Yes, cognitively intact   How does the patient rate their overall health at the present time? Fair   Describe the patient's ability to walk at the present time. Minor restrictions or changes   How often would a person be available to care for the patient? Occasionally   Number of comorbid conditions (as recorded on the chart) Three   During the past month, has the patient often been bothered by feeling down, depressed or hopeless? No   Discharge Plan A Home Health   Explained to the the patient/caregiver why the discharge planned changed: Yes   Involved the patient/caregiver in establishing a new discharge plan: Yes

## 2017-02-06 NOTE — PLAN OF CARE
[facility]    HOME HEALTH ORDERS  FACE TO FACE ENCOUNTER    Patient Name: Licha Pop  YOB: 1955    PCP: Jayden John MD   PCP Address: [address] [place] [address]  PCP Phone Number: [phone]  PCP Fax: [phone]    Encounter Date: 02/06/2017    Admit to Home Health    Diagnoses:  Active Hospital Problems    Diagnosis  POA    *Mass of left lung [R91.8]  Yes    COPD exacerbation [J44.1]  Yes    Pulmonary fibrosis [J84.10]  Yes    Headache [R51]  Yes     CT with to look for mets        Resolved Hospital Problems    Diagnosis Date Resolved POA   No resolved problems to display.       No future appointments.        I have seen and examined this patient face to face today. My clinical findings that support the need for the home health skilled services and home bound status are the following:  Weakness/numbness causing balance and gait disturbance due to Malignancy/Cancer making it taxing to leave home.    Allergies:Review of patient's allergies indicates:  No Known Allergies    Diet: regular diet    Activities: activity as tolerated    Nursing:   SN to complete comprehensive assessment including routine vital signs. Instruct on disease process and s/s of complications to report to MD. Review/verify medication list sent home with the patient at time of discharge  and instruct patient/caregiver as needed. Frequency may be adjusted depending on start of care date.    Notify MD if SBP > 160 or < 90; DBP > 90 or < 50; HR > 120 or < 50; Temp > 101; Other:         CONSULTS:    Physical Therapy to evaluate and treat. Evaluate for home safety and equipment needs; Establish/upgrade home exercise program. Perform / instruct on therapeutic exercises, gait training, transfer training, and Range of Motion.   to evaluate for community resources/long-range planning.  Aide to provide assistance with personal care, ADLs, and vital  signs.    MISCELLANEOUS CARE:  N/A    WOUND CARE ORDERS  n/a      Medications: Review discharge medications with patient and family and provide education.      Current Discharge Medication List      CONTINUE these medications which have CHANGED    Details   hydrocodone-acetaminophen 10-325mg (NORCO)  mg Tab Take 1 tablet by mouth every 8 (eight) hours as needed for Pain.  Qty: 30 tablet, Refills: 0         CONTINUE these medications which have NOT CHANGED    Details   acetaminophen (TYLENOL) 500 MG tablet Take 500 mg by mouth nightly as needed for Pain.      albuterol (PROVENTIL) 2.5 mg /3 mL (0.083 %) nebulizer solution Take 2.5 mg by nebulization 3 (three) times daily. Rescue      ezetimibe (ZETIA) 10 mg tablet Take 10 mg by mouth once daily.      predniSONE (DELTASONE) 10 MG tablet Take 10 mg by mouth once daily.      valsartan-hydrochlorothiazide (DIOVAN-HCT) 160-12.5 mg per tablet Take 1 tablet by mouth once daily.         STOP taking these medications       flu vac [id number] 36mos up,PF, (FLUARIX QUAD [id number], PF,) 60 mcg (15 mcg x 4)/0.5 mL Syrg Comments:   Reason for Stopping:         insulin aspart protamine-insulin aspart (NOVOLOG 70/30) 100 unit/mL (70-30) InPn pen Comments:   Reason for Stopping:         metformin (GLUCOPHAGE-XR) 500 MG 24 hr tablet Comments:   Reason for Stopping:         hydrocodone-acetaminophen 5-325mg (NORCO) 5-325 mg per tablet Comments:   Reason for Stopping:         ibuprofen (ADVIL,MOTRIN) 600 MG tablet Comments:   Reason for Stopping:               I certify that this patient is confined to her home and needs intermittent skilled nursing care and physical therapy.

## 2017-02-06 NOTE — NURSING
Hourly rounding performed. Pain controlled throughout shift with prescribed medication.  Pt VSS and afebrile, free of falls, trauma. Injury, and skin break downs. Pt denies SOB, CP, & N/V. Gave report to Rolando MACDONALD who assumed care immediately after biopsy. I saw pt to room 307.

## 2017-02-06 NOTE — DISCHARGE SUMMARY
[facility] Hospital Medicine  Discharge Summary      Patient Name: Licha Pop  MRN: [id number]  Admission Date: 2/3/2017  Hospital Length of Stay: 3 days  Discharge Date and Time:  02/06/2017 3:03 PM  Attending Physician: Allegra Carrizales MD   Discharging Provider: Sherwin Venegas PA-C  Primary Care Provider: Jayden John MD      HPI:   60 yo female with hx of copd, pulmonary fibrosis with weakness, progressive weight loss > 30 lbs.    CT with large left hilar mass  Has been vomiting, not eating/drinking well  Admitted to hydrate, evaluate mass      Procedure(s) (LRB):  [id number] (N/A)      Indwelling Lines/Drains at time of discharge:   Lines/Drains/Airways          No matching active lines, drains, or airways        Hospital Course:   Somewhat better after hydration, more alert, pain controlled  Had biopsy today  Will set up with rad onc, med onc prior to DC  Prognosis poor       Consults:   Consults         Status Ordering Provider     Inpatient consult to Interventional Radiology  Once     Provider:  (Not yet assigned)    Completed SHERWIN VENEGAS     Inpatient consult to Pulmonology  Once     Provider:  Willie Chirinos III, MD    Completed SHERWIN VENEGAS     Inpatient consult to Radiation Oncology  Once     Provider:  Arnold Leavitt MD    Completed SHERWIN VENEGAS     Inpatient consult to Social Work  Once     Provider:  (Not yet assigned)    Acknowledged SHERWIN VENEGAS          Significant Diagnostic Studies: Labs:   CMP   Recent Labs  Lab 02/06/17  0526      K 4.6      CO2 23   *   BUN 8   CREATININE 0.8   CALCIUM 9.3   PROT 7.1   ALBUMIN 3.1*   BILITOT 0.3   ALKPHOS 52*   AST 19   ALT 11   ANIONGAP 14   ESTGFRAFRICA >60   EGFRNONAA >60    and CBC   Recent Labs  Lab 02/06/17  0526   WBC 6.76   HGB 11.6*   HCT 36.4*          Pending Diagnostic Studies:     Procedure Component Value Units Date/Time    AMANUEL  "[[id number]] Collected:  02/04/17 1115    Order Status:  Sent Lab Status:  In process Updated:  02/04/17 1321    Specimen:  Blood from Blood     Angiotensin converting enzyme [[id number]] Collected:  02/04/17 1115    Order Status:  Sent Lab Status:  In process Updated:  02/04/17 1321    Specimen:  Blood from Blood     Anti-neutrophilic cytoplasmic antibody [[id number]] Collected:  02/04/17 1116    Order Status:  Sent Lab Status:  In process Updated:  02/06/17 1333    Specimen:  Blood from Blood     CT Biopsy Lung [[id number]] Updated:  02/06/17 1134    Order Status:  Sent Lab Status:  In process     Rheumatoid factor [[id number]] Collected:  02/04/17 1115    Order Status:  Sent Lab Status:  In process Updated:  02/06/17 1330    Specimen:  Blood from Blood     X-Ray Chest AP Portable [[id number]]     Order Status:  Sent Lab Status:  No result         Final Active Diagnoses:    Diagnosis Date Noted POA    PRINCIPAL PROBLEM:  Mass of left lung [R91.8] 02/03/2017 Yes    COPD exacerbation [J44.1] 02/04/2017 Yes    Pulmonary fibrosis [J84.10] 02/04/2017 Yes    Headache [R51] 02/04/2017 Yes      Problems Resolved During this Admission:    Diagnosis Date Noted Date Resolved POA      * Mass of left lung  Likely malignant  Biopsytoday  Mets noted  Poor prognosis  Discussed with family      COPD exacerbation        Pulmonary fibrosis        Headache  No mets on ct brain        Discharged Condition: fair    Disposition: Home or Self Care    Follow Up:    Patient Instructions:     CANE FOR HOME USE   Order Specific Question Answer Comments   Type of Cane: Narrow Quad    Height: 5' 4" (1.626 m)    Weight: 49.9 kg (110 lb)    Does patient have medical equipment at home? none    Length of need (1-99 months): 99    Please check all that apply: Patient's condition impairs ambulation.      Diet general     Activity as tolerated       Medications:  Reconciled Home Medications:   Current Discharge Medication List      CONTINUE these " medications which have CHANGED    Details   hydrocodone-acetaminophen 10-325mg (NORCO)  mg Tab Take 1 tablet by mouth every 8 (eight) hours as needed for Pain.  Qty: 30 tablet, Refills: 0         CONTINUE these medications which have NOT CHANGED    Details   acetaminophen (TYLENOL) 500 MG tablet Take 500 mg by mouth nightly as needed for Pain.      albuterol (PROVENTIL) 2.5 mg /3 mL (0.083 %) nebulizer solution Take 2.5 mg by nebulization 3 (three) times daily. Rescue      ezetimibe (ZETIA) 10 mg tablet Take 10 mg by mouth once daily.      predniSONE (DELTASONE) 10 MG tablet Take 10 mg by mouth once daily.      valsartan-hydrochlorothiazide (DIOVAN-HCT) 160-12.5 mg per tablet Take 1 tablet by mouth once daily.         STOP taking these medications       flu vac [id number] 36mos up,PF, (FLUARIX QUAD [id number], PF,) 60 mcg (15 mcg x 4)/0.5 mL Syrg Comments:   Reason for Stopping:         insulin aspart protamine-insulin aspart (NOVOLOG 70/30) 100 unit/mL (70-30) InPn pen Comments:   Reason for Stopping:         metformin (GLUCOPHAGE-XR) 500 MG 24 hr tablet Comments:   Reason for Stopping:         hydrocodone-acetaminophen 5-325mg (NORCO) 5-325 mg per tablet Comments:   Reason for Stopping:         ibuprofen (ADVIL,MOTRIN) 600 MG tablet Comments:   Reason for Stopping:             Time spent on the discharge of patient: 60 minutes    Sherwin Venegas PA-C  Department of Hospital Medicine  [facility]

## 2017-02-06 NOTE — CONSULTS
Consult/H&P Note  Interventional Radiology    Consult Requested By: Natan Barnes    Reason for Consult: lung masses    SUBJECTIVE:     Chief Complaint: lung mass    History of Present Illness: 62 yo female with large lung masses    Past Medical History   Diagnosis Date    Diabetes mellitus     Hypertension      History reviewed. No pertinent past surgical history.  History reviewed. No pertinent family history.  Social History   Substance Use Topics    Smoking status: Former Smoker    Smokeless tobacco: Never Used    Alcohol use No       Review of Systems:  As per H&P      OBJECTIVE:     Vital Signs Range (Last 24H):  Temp:  [98 °F (36.7 °C)-99 °F (37.2 °C)]   Pulse:  [61-99]   Resp:  [16-20]   BP: (123-150)/(41-88)   SpO2:  [91 %-100 %]     Physical Exam:  General- Patient alert and oriented x3 in NAD  ENT- PERRLA,  Neck- No masses  CV- Regular rate and rhythm  Resp-  Slightly increased WOB  GI- Non tender/non-distended  Extrem- No cyanosis, clubbing, edema.   Derm- No rashes, masses, or lesions noted  Neuro-  No focal deficits noted.     Physical Exam  Body mass index is 18.88 kg/(m^2).    Scheduled Meds:    albuterol-ipratropium 2.5mg-0.5mg/3mL  3 mL Nebulization Q4H WAKE    valsartan  120 mg Oral Daily     Continuous Infusions:    PRN Meds:acetaminophen, oxycodone, promethazine (PHENERGAN) IVPB, zolpidem    Allergies: Review of patient's allergies indicates:  No Known Allergies    Labs:    Recent Labs  Lab 02/06/17 0526   INR 1.0       Recent Labs  Lab 02/06/17 0526   WBC 6.76   HGB 11.6*   HCT 36.4*   MCV 86         Recent Labs  Lab 02/06/17 0526   *      K 4.6      CO2 23   BUN 8   CREATININE 0.8   CALCIUM 9.3   ALT 11   AST 19   ALBUMIN 3.1*   BILITOT 0.3       Vitals (Most Recent):  Temp: 98.7 °F (37.1 °C) (02/06/17 0716)  Pulse: 62 (02/06/17 0718)  Resp: 20 (02/06/17 0718)  BP: (!) 145/41 (02/06/17 0716)  SpO2: (!) 91 % (02/06/17 0718)    ASA: 3  Mallampati: 2    Consent  obtained    ASSESSMENT/PLAN:     CT guided L lung biopsy    Active Hospital Problems    Diagnosis  POA    *Mass of left lung [R91.8]  Yes    COPD exacerbation [J44.1]  Yes    Pulmonary fibrosis [J84.10]  Yes    Headache [R51]  Yes     CT with to look for mets        Resolved Hospital Problems    Diagnosis Date Resolved POA   No resolved problems to display.           Issa Lovett MD

## 2017-02-06 NOTE — PLAN OF CARE
Discharge Planning:  Patient admitted on 2-3-17  LOS-day 3  Chart reviewed  Care plan discussed    Discussed care plan with treatment team  Discussed care plan with the attending Dr Carrizales  Current dispo - home today or tomorrow  Home health to follow  See PCP in 1 week  See Dr Pineda as scheduled  Case management to follow  Consults following are: oncology, case mgt.  Addendum;  Sherwin OSBORNE ordered home oxygen  Spoke with alla at [facility]  Delivery of oxygen scheduled around 7:30 P tonight  Bedside nurse and charge nurse notified

## 2017-02-06 NOTE — PHYSICIAN QUERY
"PT Name: Licha Pop  MR #: [id number]  Physician Query Form - Nutrition Clarification   Reviewer  Emmie Carlos RN, CCDS  ext [phone])     This form is a permanent document in the medical record.     Query Date: February 6, 2017  By submitting this query, we are merely seeking further clarification of documentation.. Please utilize your independent clinical judgment when addressing the question(s) below.    The Medical record reflects the following:   Indicators     Supporting Clinical Findings Location in Medical Record   x Wt/ BMI/ Usual Body Weight BMI 18.9 Anthropometric FS 2/4/17    Alb          TProt            Pre-Albumin     x Acute or Chronic illness "hx of copd, pulmonary fibrosis with weakness, progressive weight loss > 30 lbs.   CT with large left hilar mass  Has been vomiting, not eating/drinking well" H&P 2/4/17    % of estimated energy intake over a time frame from p.o., TF or TPN      Weight status over a time frame      Subcutaneous fat and/or muscle loss      Reduced  strength      "Delayed wound healing:, "Failure to thrive"      "Fluid accumulation" or "Edema"      "Hypoalbuminemia"     x Other:  "progressive weight loss > 30 lbs"    "cachectic"    "Boost Glucose Control Supplement Vanilla"    "Poor appetite, encouraged boost" H&P       Pulmonology PN 2/5/17    MD order 2/4/17      Nurses note 2/4/17       AND / ASPEN Clinical Characteristics (October 2011)  A minimum of two characteristics is recommended for diagnosing either moderate or severe malnutrition    Mild Malnutrition Moderate Malnutrition Severe Malnutrition    Energy Intake from p.o., TF or TPN. < 75% intake of estimated energy needs for less than 7 days. < 75% intake of estimated energy needs for greater than 7 days. < 50% intake of estimated energy needs for > 5 days   Weight Loss 1-2% in 1 month  5% in 3 months  7.5% in 6 months  10% in one year 1-2 % in 1 week  5% in 1 month  7.5% in 3 months  10% in 6 " months  20% in 1 year > 2% in 1 week  > 5% in 1 month  >7.5% in 3 months  >10% in 6 months  >20% in 1 year   Physical Findings None *Mild subcutaneous fat and/or muscle loss  *Mild fluid accumulation  *Stage II decubitus  *Surgical wound or non-healing wound *Mod subcutaneous fat and/or muscle loss  *Mod/severe fluid accumulation  *Stage III or IV decubitus  *Non-healing surgical wound     Provider, please specify the diagnosis or diagnoses associated with above clinical findings.                                [ ] Mild Protein-Calorie Malnutrition  [ x] Moderate Protein-Calorie Malnutrition  [ ] Severe Protein-CalorieMalnutrition  [ ] Cachexia  [ ] Anorexia  [ ] Other Nutritional Diagnosis (Specify) ____________________________________  [ ] Clinically Undetermined    Please document in your progress notes daily for the duration of treatment, until resolved, and include in your discharge summary.

## 2017-02-07 ENCOUNTER — TELEPHONE (OUTPATIENT)
Dept: HEMATOLOGY/ONCOLOGY | Facility: CLINIC | Age: 62
End: 2017-02-07

## 2017-02-07 LAB — ACE SERPL-CCNC: 33 U/L

## 2017-02-07 NOTE — PLAN OF CARE
Discharge Planning:  Patient admitted on 2-3-17  LOS-day 3  Chart reviewed  Care plan discussed     Discussed care plan with treatment team  Discussed care plan with the attending Dr Carrizales  Current dispo - home today   Home health to follow- [facility]   Home oxygen- [facility]    See PCP in 1 week  See Dr Pineda as scheduled  Case management to follow  Consults following are: oncology, case mgt.  Addendum:   Sherwin OSBORNE ordered home oxygen  Spoke with alla at [facility]  Delivery of oxygen scheduled around 7-7:30 P tonight  Bedside nurse and charge nurse notified

## 2017-02-07 NOTE — PROCEDURES
FINDINGS:  The patient is a 61-year-old female, 64 inches tall, and 110 pounds   with a diagnosis of shortness of breath and asthma.  The patient has a forced   vital capacity of 1.28 liters, which is 49% of predicted.  FEV1 is 52% of   predicted at 1.01 liters.  FEV1/FVC is 107% of predicted.  FEF 25/75 is   decreased at 34% of predicted.  There is no significant response to   bronchodilators.  Lung volume showed restriction with air trapping with a total   lung capacity at 70%.  Diffusing capacity corrects for alveolar ventilation.    Flow volume loop is consistent with above.    ASSESSMENT:  1.  Moderate obstructive lung disease primarily in the small airways with no   significant response to bronchodilators.  2.  Moderate restrictive ventilatory defect with air trapping.  3.  Normal diffusing capacity.      CCS/SN  dd: 02/07/2017 08:08:35 (CST)  td: 02/07/2017 10:20:49 (CST)  Doc ID   #[id number]  Job ID #[id number]    CC:

## 2017-02-07 NOTE — NURSING
Informed patient and her son not to leave until O2 is delivered to her. She verbalized understanding.

## 2017-02-07 NOTE — NURSING
Pt is for discharge to home.  IV access removed Pressure applied. No bleeding noted.  Gauze applied to site, covered with coban. Discharge instructions discussed with patient and her sons, all vebralized understanding Prescription for noroco given to patient.

## 2017-02-08 LAB
ANCA AB TITR SER IF: NORMAL TITER
P-ANCA TITR SER IF: NORMAL TITER

## 2017-02-15 PROBLEM — C34.90 SMALL CELL LUNG CANCER: Status: ACTIVE | Noted: 2017-01-01

## 2017-02-15 PROBLEM — J44.9 COPD (CHRONIC OBSTRUCTIVE PULMONARY DISEASE): Status: ACTIVE | Noted: 2017-01-01

## 2017-02-15 PROBLEM — J44.1 COPD EXACERBATION: Status: RESOLVED | Noted: 2017-02-04 | Resolved: 2017-01-01

## 2017-02-15 NOTE — PROGRESS NOTES
Subjective:       Patient ID: Licha Pop is a 61 y.o. female.    Chief Complaint: No chief complaint on file.      HPI     REFERRING PHYSICIAN:  Jann Live M.D.    REASON FOR REFERRAL:  Recent diagnosis of breast cancer, consideration for   chemotherapy.    HISTORY OF PRESENT ILLNESS:  Ms. Pop is a very pleasant 61-year-old   -American female who was recently hospitalized earlier this month with   shortness of breath.  Of note, a chest x-ray in early January had showed an   opacity in the left upper lobe.  A repeat chest x-ray during her admit showed   that the mass had increased in size.  A CT scan of the chest was performed on   02/03/2017 and showed a 3.7 x 3.7 cm mass in the left upper lobe.  There was   also a mediastinal soft tissue density mass measuring 5.8 x 7.6 cm.  The patient   eventually underwent a biopsy and was subsequently discharged and was given a   followup to our clinic today.  The biopsy shows a high-grade neuroendocrine   carcinoma consistent with a small cell lung cancer.    PAST MEDICAL HISTORY:  As above.  It is also significant for COPD, pulmonary   fibrosis, diabetes and hypertension.    PAST SURGICAL HISTORY:  Unremarkable.    FAMILY HISTORY:  Negative for cancer.    SOCIAL HISTORY:  She is a homemaker.  She is .  She has a 90-pack-year   history of smoking.    Review of Systems      Overall she feels fair.  She complains of SOB with exertion and has been using portable oxygen since her discharge last week.  She also complains of rib, chest, and back pain, which is controlled with hydrocodone.  She denies any anxiety, depression, easy bruising, fevers, chills, night  sweats, nausea, vomiting, diarrhea, constipation, diplopia, blurred vision, headache, chest pain, palpitations, breast pain, abdominal pain, extremity pain, or difficulty ambulating.   She states that she has lost weight, but is unable to furtehr elaborate.  Review of her chart suggests  that she has lost approximately 30 lbs.The remainder of the ten-point ROS, including general, skin, lymph, H/N, cardiorespiratory, GI, , Neuro, Endocrine, and psychiatric is negative.       Objective:      Physical Exam      She is alert, oriented to time, place, person, pleasant, thin-appearing, in no acute physical distress.    She is accompanied by one of her 5 children.                               VITAL SIGNS:  Reviewed                                      HEENT:  Normal.  There are no nasal, oral, lip, gingival, auricular, lid, or conjunctival lesions.  Mucosae are moist and pink, and there is no thrush.  Pupils are equal, reactive to light and accommodation.              Extraocular muscle movements are intact.  Dentition is fair.  There is no frontal or maxillary tenderness.                                     NECK:  Supple without JVD, adenopathy, or thyromegaly.                       LUNGS:  Clear to auscultation without wheezing, rales, or rhonchi.           CARDIOVASCULAR:  Reveals an S1, S2, no murmurs, no rubs, no gallops.         ABDOMEN:  Soft, slim, nontender, without organomegaly.  Bowel sounds are    present.                                                                     EXTREMITIES:  No cyanosis, clubbing, or edema.                               BREASTS:  She is status post left lumpectomy with a well-healed incision.    There are no masses in either breast.                                        LYMPHATIC:  There is no cervical, axillary, or supraclavicular adenopathy.   SKIN:  Warm and moist, without petechiae, rashes, induration, or ecchymoses.  A tattoo is seen on the anterior chest wall.         NEUROLOGIC:  DTRs are 0-1+ bilaterally, symmetrical, motor function is 5/5, and cranial nerves are  within normal limits.    Radiologic studies reviewed.    Assessment:       1. Small cell lung cancer, left upper lobe.  Unclear if it is limited or extensive stage   2. Other emphysema    3.   Diabetes   4.        Plan:        I had a long discussion with her and her son.  Duration of visit was one hour.  I explained to them that she has a small cell lung carcinoma.  It is unclear at this point whether this is a limited stage or an extensive stage, as her workup has not been completed.   We will proceed with a PET scan ASA, and refer back to Dr. Live.  Will also obtain baseline labs and see her again early next week.  If she has truly a limited stage small cell carcinoma, we will treat with combined chemo-XRT.  If, on the other hand she has extensive stage disease, she will be treated with chemotherapy upfront.  I offered to her to be admitted to expedite her workup, however, both she and her son elected to pursue the workup as an outpatient.  I have advised her to call an ambulance and come to the [facility] main campus should her baseline SOB worsen or should she experience any other symptoms.  She voiced understanding.  Her questions were answered to her satisfaction.  RTC on February 22nd to begin chemotherapy, since her PET scan cannot be done before the 21st.  I offered to her to start chemotherapy at the [facility] sooner, but both her and her son did not seem to know where the [facility] is, and stated they would prefer to come to this facility. I will ask my  (Beverley Chirinos) to reach out to her and make sure she is able to come to the Clinic; we will also need to arrange for home health care visits as I do have concerns about her ability to manage potential complications of her treatment at home.        2/16/2017 8:30 a.m.  ADDENDUM    Results of video capsule swallow study were received and reviewed.  Apparently she had a few AVMs in the proximal ilieum.

## 2017-02-15 NOTE — LETTER
February 15, 2017      Jann Live Jr., MD  [address] [address]           [facility]ematology On[facility]  [address]  [address]  [place] [address]  Phone: [phone]          Patient: Licha Pop   MR Number: [id number]   YOB: 1955   Date of Visit: 2/15/2017       Dear Dr. Jann Live Jr.:    Thank you for referring Licha Pop to me for evaluation. Attached you will find relevant portions of my assessment and plan of care.    If you have questions, please do not hesitate to call me. I look forward to following Licha Pop along with you.    Sincerely,    Jose Oliva MD    Enclosure  CC:  No Recipients    If you would like to receive this communication electronically, please contact [email or web address] or [phone] to request more information on [employer] access.    For providers and/or their staff who would like to refer a patient to [facility], please contact us through our one-stop-shop provider referral line, [facility], at [phone].    If you feel you have received this communication in error or would no longer like to receive these types of communications, please e-mail [email or web address]

## 2017-02-22 PROBLEM — Z51.11 ENCOUNTER FOR ANTINEOPLASTIC CHEMOTHERAPY: Status: ACTIVE | Noted: 2017-01-01

## 2017-02-22 PROBLEM — C79.51 BONE METASTASES: Status: ACTIVE | Noted: 2017-01-01

## 2017-02-22 NOTE — MR AVS SNAPSHOT
Patient Information     Patient Name Sex Licha Hui Female 1955      Visit Information        Provider Department Dep Phone Center    2017 11:00 AM [facility], CHEMO [facility] Chemotherapy Infusion [phone] Rodolfo Barrios      Patient Instructions     None      Your Current Medications Are     acetaminophen (TYLENOL) 500 MG tablet    albuterol (PROVENTIL) 2.5 mg /3 mL (0.083 %) nebulizer solution    ezetimibe (ZETIA) 10 mg tablet    hydrocodone-acetaminophen 10-325mg (NORCO)  mg Tab    ondansetron (ZOFRAN, AS HYDROCHLORIDE,) 4 MG tablet    predniSONE (DELTASONE) 10 MG tablet    valsartan-hydrochlorothiazide (DIOVAN-HCT) 160-12.5 mg per tablet      Facility-Administered Medications     cisplatin (PLATINOL) 75 mg/m2 = 113 mg in sodium chloride 0.9% 500 mL chemo infusion    etoposide (VEPESID) 100 mg/m2 = 150 mg in sodium chloride 0.9% 500 mL chemo infusion    fosaprepitant 150 mg in sodium chloride 0.9% 150 mL IVPB    palonosetron 0.25mg/dexamethasone 10mg IVPB    sodium chloride 0.9% 1,000 mL with magnesium sulfate 1 g, potassium chloride 20 mEq infusion    sodium chloride 0.9% 1,000 mL with magnesium sulfate 1 g, potassium chloride 20 mEq infusion    sodium chloride 0.9% flush 10 mL      Appointments for Next Year     2017  8:00 AM INFUSION 090 MIN (90 min.) [facility], [facility]    Arrive at check-in approximately 15 minutes before your scheduled appointment time. Bring all outside medical records and imaging, along with a list of your current medications and insurance card.    [facility], 5th Floor    2017  8:00 AM INFUSION 090 MIN (90 min.) [facility], [facility]    Arrive at check-in approximately 15 minutes before your scheduled appointment time. Bring all outside medical records and imaging, along with a list of your current medications and insurance card.    [facility], 5th Floor    3/1/2017  7:00 AM NON FASTING  LAB (15 min.) [facility]    Arrive at check-in approximately 15 minutes before your scheduled appointment time. Bring all outside medical records and imaging, along with a list of your current medications and insurance card.    2nd Floor    3/1/2017  7:30 AM ESTABLISHED PATIENT (30 min.) [facility] Jose Oliva MD    Arrive at check-in approximately 15 minutes before your scheduled appointment time. Bring all outside medical records and imaging, along with a list of your current medications and insurance card.    [facility]         Default Flowsheet Data (last 24 hours)      Amb Complex Vitals Ross        02/22/17 0913 02/22/17 0731             Measurements    Weight  49 kg (108 lb 0.4 oz)       BP (!)  189/90 (!)  199/96       Temp 98.1 °F (36.7 °C) 98 °F (36.7 °C)       Pulse 91 93       Resp 20 (!)  22       SpO2  100 %       Pain Assessment    Pain Score Eight Zero       Pain Loc ARM                Allergies     No Known Allergies      Medications You Received from 02/21/2017 1443 to 02/22/2017 1443        Date/Time Order Dose Route Action     02/22/2017 1241 cisplatin (PLATINOL) 75 mg/m2 = 113 mg in sodium chloride 0.9% 500 mL chemo infusion 113 mg Intravenous New Bag     02/22/2017 1358 etoposide (VEPESID) 100 mg/m2 = 150 mg in sodium chloride 0.9% 500 mL chemo infusion 150 mg Intravenous New Bag     02/22/2017 1218 fosaprepitant 150 mg in sodium chloride 0.9% 150 mL IVPB 150 mg Intravenous New Bag     02/22/2017 1157 palonosetron 0.25mg/dexamethasone 10mg IVPB 0.25 mg Intravenous New Bag     02/22/2017 1001 sodium chloride 0.9% 1,000 mL with magnesium sulfate 1 g, potassium chloride 20 mEq infusion   Intravenous New Bag      Current Discharge Medication List     Cannot display discharge medications since this is not an admission.

## 2017-02-22 NOTE — PLAN OF CARE
Problem: Patient Care Overview  Goal: Discharge Needs Assessment  Outcome: Ongoing (interventions implemented as appropriate)  Pt tolerated first cisplatin/etoposide  without adverse effects. VSS. Provided AVS & verbalized understanding of RTC date. PIV left in place for chemo in morning 2/23/17. Flushed with good blood return & capped.   DC with family ambulating independently.

## 2017-02-22 NOTE — PLAN OF CARE
Problem: Chemotherapy Effects (Adult)  Goal: Signs and Symptoms of Listed Potential Problems Will be Absent, Minimized or Managed (Chemotherapy Effects)  Signs and symptoms of listed potential problems will be absent, minimized or managed by discharge/transition of care (reference Chemotherapy Effects (Adult) CPG).  Outcome: Ongoing (interventions implemented as appropriate)  Labs , hx, and medications reviewed. Assessment completed. Discussed plan of care with patient. Reviewed new pt binder, infusion policies, dietary restrictions & s/s to eport to MD. Patient in agreement. VSS.  Chair reclined and warm blanket and snack offered. Will cont to monitor  .

## 2017-02-22 NOTE — PROGRESS NOTES
Subjective:       Patient ID: Licha Pop is a 61 y.o. female.    Chief Complaint: No chief complaint on file.      HPI     Ms. Pop returns today for follow up and to begin her chemotherapy.  Her PET scan from yesterday shows extensive neck and hilar adenopathy and also two foci in the left iliac bone.  Her CBC and CMP are normal.  Briefly, she  is a very pleasant 61-year-old -American female who was recently hospitalized earlier this month with   shortness of breath.  Of note, a chest x-ray in early January had showed an   opacity in the left upper lobe.  A repeat chest x-ray during her admit showed   that the mass had increased in size.  A CT scan of the chest was performed on   02/03/2017 and showed a 3.7 x 3.7 cm mass in the left upper lobe.  There was   also a mediastinal soft tissue density mass measuring 5.8 x 7.6 cm.  The patient   eventually underwent a biopsy and was subsequently discharged and was given a   followup to our clinic.  Unfortunately her biopsy showed a high-grade neuroendocrine   carcinoma consistent with a small cell lung cancer.    Review of Systems      Overall she feels fair.  She again complains of SOB with exertion and has been using portable oxygen since her discharge last week.  She also complains of rib, chest, and back pain, which is controlled with hydrocodone.  She denies any anxiety, depression, easy bruising, fevers, chills, night  sweats, nausea, vomiting, diarrhea, constipation, diplopia, blurred vision, headache, chest pain, palpitations, breast pain, abdominal pain, extremity pain, or difficulty ambulating.   She states that she has lost weight, but is unable to furtehr elaborate.  She has lost approximately 30 lbs. The remainder of the ten-point ROS, including general, skin, lymph, H/N, cardiorespiratory, GI, , Neuro, Endocrine, and psychiatric is negative.       Objective:      Physical Exam      She is alert, oriented to time, place, person,  pleasant, thin-appearing, in no acute physical distress.    She is accompanied by her son.                               VITAL SIGNS:  Reviewed                                      HEENT:  Normal.  There are no nasal, oral, lip, gingival, auricular, lid, or conjunctival lesions.  Mucosae are moist and pink, and there is no thrush.  Pupils are equal, reactive to light and accommodation.              Extraocular muscle movements are intact.  Dentition is fair.  There is no frontal or maxillary tenderness.                                     NECK:  Supple without JVD, adenopathy, or thyromegaly.                       LUNGS:  Clear to auscultation without wheezing, rales, or rhonchi.           CARDIOVASCULAR:  Reveals an S1, S2, no murmurs, no rubs, no gallops.         ABDOMEN:  Soft, slim, nontender, without organomegaly.  Bowel sounds are    present.                                                                     EXTREMITIES:  No cyanosis, clubbing, or edema.                               BREASTS:  She is status post left lumpectomy with a well-healed incision.    There are no masses in either breast.                                        LYMPHATIC:  There is no cervical, axillary, or supraclavicular adenopathy.   SKIN:  Warm and moist, without petechiae, rashes, induration, or ecchymoses.  A tattoo is seen on the anterior chest wall.         NEUROLOGIC:  DTRs are 0-1+ bilaterally, symmetrical, motor function is 5/5, and cranial nerves are  within normal limits.    Radiologic studies reviewed.    Assessment:       1. Small cell lung cancer, left upper lobe.  Unclear if it is limited or extensive stage   2. Other emphysema    3.  Diabetes   4. Encounter for chemotherapy     5.    Bone metastases  Plan:        I had a long discussion with her and her son.  I explained to her that she appears to have extensive stage small cell lung cancerhich is not curable.  I explained to benefits of chemotherapy in terms of life  prolongation. I also gave her neutropenic precautions.  She will receive three days of platinum etoposide at the [facility], followed by Neulasta.  She will take zofran for nausea.  I will see her again in 7 days with repeat labs for follow up.  Her son was extremely distraught, and I did not have a chance to discuss bone directed therapy; will do so at the time of her next visit or tomorrow at the [facility].  Her questions were answered to her satisfaction.

## 2017-02-23 NOTE — PLAN OF CARE
Problem: Patient Care Overview  Goal: Plan of Care Review  Outcome: Ongoing (interventions implemented as appropriate)  Patient arrived accompanied by family member. Complains of some nausea. Took zofran this morning.  Goal: Individualization & Mutuality  1. Warm blankets  2. PIV   Outcome: Ongoing (interventions implemented as appropriate)  Warm blanket given. Chair reclined for comfort.

## 2017-02-23 NOTE — PLAN OF CARE
Problem: Patient Care Overview  Goal: Discharge Needs Assessment  Outcome: Ongoing (interventions implemented as appropriate)  Tolerated treatment well. Nausea resolved. Blood pressure stable. RTC tomorrow for Day 3 at 8am. PIV left in place for tomorrow's treatment.

## 2017-02-24 NOTE — PLAN OF CARE
"Problem: Patient Care Overview  Goal: Plan of Care Review  Outcome: Ongoing (interventions implemented as appropriate)  Patient tolerated treatment without complaints. PIV upon arrival flushing with good blood return, no pain or swelling or tenderness noted. IVP nausea medication and VP16. Patient ambulated to restroom with 5 minutes VP16 left, switch to flush in restroom. Upon return from restroom, RUE noted to have swelling around PIV site, patient stating "I didn't notice". Fluids stopped, PIV removed. Heat and pressure applied to RUE. Patient encouraged elevation of extremity. Confirmed with MD, patient requiring neulasta injection, offered OBI today or injection tomorrow. Patient requesting injection tomorrow. Confirmed appt scheduled. Encouraged fluid intake. AVS provided to patient, future appointments reviewed. Discharged without s/s of adverse reaction. Instructed to call provider with any questions or concerns.           "

## 2017-02-24 NOTE — MR AVS SNAPSHOT
Patient Information     Patient Name Sex Licha Hui Female 1955      Visit Information        Provider Department Dep Phone Center    2017 8:00 AM [facility], [facility] [phone] Rodolfo Barrios      Patient Instructions     None      Your Current Medications Are     acetaminophen (TYLENOL) 500 MG tablet    albuterol (PROVENTIL) 2.5 mg /3 mL (0.083 %) nebulizer solution    ezetimibe (ZETIA) 10 mg tablet    hydrocodone-acetaminophen 10-325mg (NORCO)  mg Tab    ondansetron (ZOFRAN, AS HYDROCHLORIDE,) 4 MG tablet    predniSONE (DELTASONE) 10 MG tablet    valsartan-hydrochlorothiazide (DIOVAN-HCT) 160-12.5 mg per tablet      Facility-Administered Medications     etoposide (VEPESID) 100 mg/m2 = 150 mg in sodium chloride 0.9% 500 mL chemo infusion    etoposide (VEPESID) 100 mg/m2 = 150 mg in sodium chloride 0.9% 500 mL chemo infusion    prochlorperazine injection Soln 10 mg    sodium chloride 0.9% 1,000 mL infusion    sodium chloride 0.9% 100 mL flush bag    alteplase injection 2 mg (Discontinued)    heparin, porcine (PF) 100 unit/mL injection flush 500 Units (Discontinued)    sodium chloride 0.9% flush 10 mL (Discontinued)      Appointments for Next Year     2017  8:30 AM INJECTION (15 min.) [facility], [facility]    Arrive at check-in approximately 15 minutes before your scheduled appointment time. Bring all outside medical records and imaging, along with a list of your current medications and insurance card.    [facility], 5th Floor    3/1/2017  7:00 AM NON FASTING LAB (15 min.) [facility]    Arrive at check-in approximately 15 minutes before your scheduled appointment time. Bring all outside medical records and imaging, along with a list of your current medications and insurance card.    2nd Floor    3/1/2017  7:30 AM ESTABLISHED PATIENT (30 min.) [facility]    Arrive at check-in approximately 15 minutes before your scheduled appointment time. Bring all outside medical records and imaging, along with a list of your current medications and insurance card.    [facility]         Default Flowsheet Data (last 24 hours)      Amb Complex Vitals Ross        02/24/17 0800                Measurements    BP (!)  168/82        Temp 97.8 °F (36.6 °C)        Pulse 88        Resp 18        Pain Assessment    Pain Score Zero                Allergies     No Known Allergies      Medications You Received from 02/23/2017 0957 to 02/24/2017 0957        Date/Time Order Dose Route Action     02/24/2017 0849 etoposide (VEPESID) 100 mg/m2 = 150 mg in sodium chloride 0.9% 500 mL chemo infusion 150 mg Intravenous New Bag     02/24/2017 0836 prochlorperazine injection Soln 10 mg 10 mg Intravenous Given      Current Discharge Medication List     Cannot display discharge medications since this is not an admission.

## 2017-02-25 NOTE — NURSING
Pt. Here today for neulasta injection. Received in R. Abdomen. Tolerated well. Educated about side effects and prevention. Pt. Reports IV infiltration from yesterday. Today site is clear of redness, no swelling. No warmth to touch. No questions or concerns at this time.

## 2017-03-01 PROBLEM — T50.905A THROMBOCYTOPENIA DUE TO DRUGS: Status: ACTIVE | Noted: 2017-01-01

## 2017-03-01 PROBLEM — D69.59 THROMBOCYTOPENIA DUE TO DRUGS: Status: ACTIVE | Noted: 2017-01-01

## 2017-03-01 NOTE — PROGRESS NOTES
Patient, Licha Pop (MRN #[id number]), presented with a recent Platelet count less than 150 K/uL consistent with the definition of thrombocytopenia (ICD10 - D69.6).    Platelets   Date Value Ref Range Status   03/01/2017 99 (L) 150 - 350 K/uL Final     The patient's thrombocytopenia was monitored, evaluated, addressed and/or treated. This addendum to the medical record is made on 03/01/2017.

## 2017-03-01 NOTE — Clinical Note
See me with l;hardeep in [facility] weeks [facility] for [facility] same day.  i valente ee her during the lunch break.  She needs labs one day prior, and she needs to get a port by IR one day prior.  Labs need to be drawn BEFORE she gets the port in case she is neutropenic.

## 2017-03-01 NOTE — PROGRESS NOTES
Subjective:       Patient ID: Licha Pop is a 61 y.o. female.    Chief Complaint: No chief complaint on file.      HPI     Ms. Pop returns today for follow up after her first cycle of chemotherapy.  Her PET scan from last week had shown extensive neck and hilar adenopathy and also two foci in the left iliac bone, hence the initiation of chemotherapy..    Briefly, she  is a 61-year-old -American female who was recently hospitalized last month with shortness of breath.  Of note, a chest x-ray in early January had showed an opacity in the left upper lobe.  A repeat chest x-ray during her admit showed that the mass had increased in size.  A CT scan of the chest was performed on 02/03/2017 and showed a 3.7 x 3.7 cm mass in the left upper lobe.  There was   also a mediastinal soft tissue density mass measuring 5.8 x 7.6 cm.  The patient eventually underwent a biopsy and was subsequently discharged and was given a followup to our clinic.  Unfortunately her biopsy showed a high-grade neuroendocrine   carcinoma consistent with a small cell lung cancer.  \CBc today shows a WBC count of 5,000 /mm3, Hg 13.2 gr/sl, Ht 40.9 5 and platelets 99,000 /mm3.  Her creatinine is 1.3 mg/dl with a BUN of 32.    Review of Systems      Overall she feels OK,. She states that she only experienced mild nausea from the chemotherapy so far. .  She again complains of SOB with exertion and has been using portable oxygen since her discharge.  She also complains of rib, chest, and back pain, which is controlled with hydrocodone.  She denies any anxiety, depression, easy bruising, fevers, chills, night  sweats, nausea, vomiting, diarrhea, constipation, diplopia, blurred vision, headache, chest pain, palpitations, breast pain, abdominal pain, extremity pain, or difficulty ambulating.   She states that she has lost weight, and she is asking me whether she will be able to regain any.   The remainder of the ten-point ROS,  including general, skin, lymph, H/N, cardiorespiratory, GI, , Neuro, Endocrine, and psychiatric is negative.       Objective:      Physical Exam      She is alert, oriented to time, place, person, pleasant, thin-appearing, in no acute physical distress.    She is accompanied by her son.                               VITAL SIGNS:  Reviewed                                      HEENT:  Normal.  There are no nasal, oral, lip, gingival, auricular, lid, or conjunctival lesions.  Mucosae are moist and pink, and there is no thrush.  Pupils are equal, reactive to light and accommodation.              Extraocular muscle movements are intact.  Dentition is fair.  There is no frontal or maxillary tenderness.                                     NECK:  Supple without JVD, adenopathy, or thyromegaly.                       LUNGS:  Clear to auscultation without wheezing, rales, or rhonchi.           CARDIOVASCULAR:  Reveals an S1, S2, no murmurs, no rubs, no gallops.         ABDOMEN:  Soft, slim, nontender, without organomegaly.  Bowel sounds are    present.                                                                     EXTREMITIES:  No cyanosis, clubbing, or edema.                               BREASTS:  She is status post left lumpectomy with a well-healed incision.    There are no masses in either breast.                                        LYMPHATIC:  There is no cervical, axillary, or supraclavicular adenopathy.   SKIN:  Warm and moist, without petechiae, rashes, induration, or ecchymoses.  A tattoo is seen on the anterior chest wall.         NEUROLOGIC:  DTRs are 0-1+ bilaterally, symmetrical, motor function is 5/5, and cranial nerves are  within normal limits.    Radiologic studies reviewed.    Assessment:       1. Small cell lung cancer, left upper lobe.  Unclear if it is limited or extensive stage   2. Other emphysema    3.  Diabetes   4. Encounter for chemotherapy     5.    Bone metastases  6.       Thrombocytopenia secondary to chemotherapy.  Plan:        I had a long discussion with her and her son.  She was advised to increase her fluid intake.  She will need to observe neutropenic precautions, which were again given to her.  She will return in two weeks at the [facility] [place] with labs to see me for her second cycle of chemotherapy.  We will arrange for her to have a port through IR one day before her next chemotherapy is due.  Her questions were answered to her satisfaction.

## 2017-03-07 NOTE — PROGRESS NOTES
Subjective:       Patient ID: Licha Pop is a 61 y.o. female.    Chief Complaint: Cancer    HPI Comments: Patient here to discuss placement of a port for chemotherapy to treat her small cell lung cancer biopsy proven on 2/6/2017. She reports feeling well. She is accompanied by her two sons.     Review of Systems   Constitutional: Negative for activity change, appetite change (patient tells me she eats too much despite her noted weight loss), chills, fatigue and fever.   HENT: Negative for congestion, drooling, ear discharge, postnasal drip, sore throat and trouble swallowing.    Eyes: Negative for pain, discharge, redness and itching.        Wears glasses to read   Respiratory: Negative for cough, shortness of breath, wheezing and stridor.    Cardiovascular: Negative for chest pain, palpitations and leg swelling.   Gastrointestinal: Negative for abdominal distention, abdominal pain, constipation, diarrhea, nausea and vomiting.   Genitourinary: Negative for difficulty urinating, dysuria, frequency and urgency.   Musculoskeletal: Negative for arthralgias, back pain, gait problem, joint swelling and myalgias.   Skin: Negative for color change, pallor and rash.   Neurological: Positive for headaches (with elevated blood pressure). Negative for dizziness and weakness.       Objective:      Physical Exam   Constitutional: She is oriented to person, place, and time. She appears cachectic. No distress.   HENT:   Head: Normocephalic and atraumatic.   Right Ear: External ear normal.   Left Ear: External ear normal.   Nose: Nose normal.   Mouth/Throat: Oropharynx is clear and moist. No oropharyngeal exudate.   Eyes: Conjunctivae are normal. Pupils are equal, round, and reactive to light. Right eye exhibits no discharge. Left eye exhibits no discharge. No scleral icterus.   Neck: Neck supple. No tracheal deviation present. No thyromegaly present.   Cardiovascular: Normal rate, regular rhythm, normal heart sounds  and intact distal pulses.  Exam reveals no gallop and no friction rub.    No murmur heard.  Pulmonary/Chest: Effort normal and breath sounds normal. No stridor. No respiratory distress. She has no wheezes. She has no rales.   Abdominal: Soft. Bowel sounds are normal. She exhibits no distension and no mass. There is no hepatosplenomegaly. There is no tenderness. There is no rebound and no guarding.   Musculoskeletal: She exhibits no edema.   Lymphadenopathy:     She has no cervical adenopathy.   Neurological: She is alert and oriented to person, place, and time. Gait normal.   Skin: Skin is warm and dry. She is not diaphoretic. No cyanosis or erythema. No pallor. Nails show clubbing.   Psychiatric: She has a normal mood and affect.   Vitals reviewed.      Assessment:       1. Small cell lung cancer, unspecified laterality        Plan:         Discussed how port placement will be performed, risk/benefits, possible complications, and pre-post procedure expectations. Patient and sons verbalize understanding and agreement. Consent signed. Patient had her labs drawn today. She is scheduled for her port placement on 3/14/17. Pre-procedure handout with clinic phone number provided.     Encouraged patient to take her blood pressure medication as prescribed. Explained if her blood pressure is elevated the day of her procedure, it may cause a delay.    Congratulated patient on quitting smoking. Encouraged her to continue.    Encouraged patient to try protein shakes between her meals. Explained I was concerned about her nutritional status. Patient agrees to try.

## 2017-03-14 NOTE — PROGRESS NOTES
Pt arrived to IR room 188 for port placement, no acute distress noted. Orders, consent and labs reviewed on chart.

## 2017-03-14 NOTE — H&P
Radiology History & Physical      SUBJECTIVE:     Chief Complaint:  Patient requiring chemotherapy for small cell lung cancer     History of Present Illness:  Licha Pop is a 61 y.o. female who presents for image guided tunneled port catheter placement.  Past Medical History:   Diagnosis Date    COPD (chronic obstructive pulmonary disease)     noted in MD h/p pt not aware of this diagnosis    Diabetes mellitus     Hypertension     On home oxygen therapy     since jan 2017     History reviewed. No pertinent surgical history.    Home Meds:   Prior to Admission medications    Medication Sig Start Date End Date Taking? Authorizing Provider   acetaminophen (TYLENOL) 500 MG tablet Take 500 mg by mouth nightly as needed for Pain.   Yes Historical Provider, MD   albuterol (PROVENTIL) 2.5 mg /3 mL (0.083 %) nebulizer solution Take 2.5 mg by nebulization 3 (three) times daily. Rescue   Yes Historical Provider, MD   ezetimibe (ZETIA) 10 mg tablet Take 10 mg by mouth once daily.   Yes Historical Provider, MD   hydrocodone-acetaminophen 10-325mg (NORCO)  mg Tab Take 1 tablet by mouth every 8 (eight) hours as needed for Pain. 2/6/17  Yes Sherwin Venegas PA-C   ondansetron (ZOFRAN, AS HYDROCHLORIDE,) 4 MG tablet Take 1 tablet (4 mg total) by mouth every 4 (four) hours as needed for Nausea. 2/22/17 2/22/18 Yes Jose Oliva MD   predniSONE (DELTASONE) 10 MG tablet Take 10 mg by mouth once daily.   Yes Historical Provider, MD   valsartan-hydrochlorothiazide (DIOVAN-HCT) 160-12.5 mg per tablet Take 1 tablet by mouth once daily.   Yes Historical Provider, MD     Anticoagulants/Antiplatelets: no anticoagulation    Allergies: Review of patient's allergies indicates:  No Known Allergies  Sedation History:  no adverse reactions    Review of Systems:   Hematological: no known coagulopathies  Respiratory: no shortness of breath  Cardiovascular: no chest pain  Gastrointestinal: no abdominal  pain  Genito-Urinary: no dysuria  Musculoskeletal: negative  Neurological: no TIA or stroke symptoms         OBJECTIVE:     Vital Signs (Most Recent)  Temp: 96.8 °F (36 °C) (03/14/17 0820)  Pulse: 82 (03/14/17 0820)  Resp: 18 (03/14/17 0820)  BP: (!) 157/68 (03/14/17 0820)  SpO2: 97 % (03/14/17 0820)    Physical Exam:  ASA: 2  Mallampati: 2    General: no acute distress  Mental Status: alert and oriented to person, place and time  HEENT: normocephalic, atraumatic  Chest: unlabored breathing  Heart: regular heart rate  Abdomen: nondistended  Extremity: moves all extremities    Laboratory  Lab Results   Component Value Date    INR 1.0 03/07/2017       Lab Results   Component Value Date    WBC 5.07 03/01/2017    HGB 13.2 03/01/2017    HCT 40.9 03/01/2017    MCV 85 03/01/2017    PLT 99 (L) 03/01/2017      Lab Results   Component Value Date     (H) 03/01/2017     03/01/2017    K 4.1 03/01/2017     03/01/2017    CO2 28 03/01/2017    BUN 32 (H) 03/01/2017    CREATININE 1.3 03/01/2017    CALCIUM 9.7 03/01/2017    ALT 12 03/01/2017    AST 16 03/01/2017    ALBUMIN 3.8 03/01/2017    BILITOT 0.7 03/01/2017       ASSESSMENT/PLAN:     Sedation Plan: Moderate.  Patient will undergo image guided tunneled port/catheter placement.    Andrew Rodriguez  Radiology  PGY-3

## 2017-03-14 NOTE — PROGRESS NOTES
Pt arrived to [facility] s/p left upper arm port placement. Pt awake, alert, and oriented. Family updated at bedside. Report received from Xiomara Gage RN.

## 2017-03-14 NOTE — PROGRESS NOTES
Port catheter placement completed, pt tolerated well. No apparent distress noted. Dressing applied CDI. Pt to be transferred to [facility], report to be given at bedside.

## 2017-03-14 NOTE — PROCEDURES
Radiology Post-Procedure Note    Pre Op Diagnosis: Lung cancer    Post Op Diagnosis: Same    Procedure: PORT placement    Procedure performed by: Virginia LUND, Landy Louis    Written Informed Consent Obtained: Yes    Specimen Removed: No    Estimated Blood Loss: Minimal    Findings:   Using realtime U/S guidance a 5 Fr port catheter was placed into the left Brachial Vein vein with tip of the catheter in the SVC.    Port is ready for use.     Carlos Louis MD  Resident  Department of Radiology  Pager: [phone]

## 2017-03-14 NOTE — PROGRESS NOTES
Pt discharged to home.  Discharge instructions given, pt  stated understanding.  Dressing to arm dry and intact, IV removed.  Pt left via wheelchair with son to home.

## 2017-03-14 NOTE — IP AVS SNAPSHOT
[facility]  [address]  [facility] [address]  Phone: [phone]           Patient Discharge Instructions     Our goal is to set you up for success. This packet includes information on your condition, medications, and your home care. It will help you to care for yourself so you don't get sicker and need to go back to the hospital.     Please ask your nurse if you have any questions.        There are many details to remember when preparing to leave the hospital. Here is what you will need to do:    1. Take your medicine. If you are prescribed medications, review your Medication List in the following pages. You may have new medications to  at the pharmacy and others that you'll need to stop taking. Review the instructions for how and when to take your medications. Talk with your doctor or nurses if you are unsure of what to do.     2. Go to your follow-up appointments. Specific follow-up information is listed in the following pages. Your may be contacted by a transition nurse or clinical provider about future appointments. Be sure we have all of the phone numbers to reach you, if needed. Please contact your provider's office if you are unable to make an appointment.     3. Watch for warning signs. Your doctor or nurse will give you detailed warning signs to watch for and when to call for assistance. These instructions may also include educational information about your condition. If you experience any of warning signs to your health, call your doctor.               [facility]  Unless otherwise directed by your provider, please contact [facility], our nurse care line that is available for 24/7 assistance.     [phone] (toll-free)    Registered nurses in the [facility] provide clinical advisement, health education, appointment booking, and other advisory services.                    ** Verify the list of medication(s) below is accurate and up  to date. Carry this with you in case of emergency. If your medications have changed, please notify your healthcare provider.             Medication List      ASK your doctor about these medications        Additional Info                      acetaminophen 500 MG tablet   Commonly known as:  TYLENOL   Refills:  0   Dose:  500 mg    Instructions:  Take 500 mg by mouth nightly as needed for Pain.     Begin Date    AM    Noon    PM    Bedtime       albuterol 2.5 mg /3 mL (0.083 %) nebulizer solution   Commonly known as:  PROVENTIL   Refills:  0   Dose:  2.5 mg    Instructions:  Take 2.5 mg by nebulization 3 (three) times daily. Rescue     Begin Date    AM    Noon    PM    Bedtime       ezetimibe 10 mg tablet   Commonly known as:  ZETIA   Refills:  0   Dose:  10 mg    Instructions:  Take 10 mg by mouth once daily.     Begin Date    AM    Noon    PM    Bedtime       hydrocodone-acetaminophen 10-325mg  mg Tab   Commonly known as:  NORCO   Quantity:  30 tablet   Refills:  0   Dose:  1 tablet    Instructions:  Take 1 tablet by mouth every 8 (eight) hours as needed for Pain.     Begin Date    AM    Noon    PM    Bedtime       ondansetron 4 MG tablet   Commonly known as:  ZOFRAN (AS HYDROCHLORIDE)   Quantity:  40 tablet   Refills:  4   Dose:  4 mg    Instructions:  Take 1 tablet (4 mg total) by mouth every 4 (four) hours as needed for Nausea.     Begin Date    AM    Noon    PM    Bedtime       predniSONE 10 MG tablet   Commonly known as:  DELTASONE   Refills:  0   Dose:  10 mg    Instructions:  Take 10 mg by mouth once daily.     Begin Date    AM    Noon    PM    Bedtime       valsartan-hydrochlorothiazide 160-12.5 mg per tablet   Commonly known as:  DIOVAN-HCT   Refills:  0   Dose:  1 tablet    Instructions:  Take 1 tablet by mouth once daily.     Begin Date    AM    Noon    PM    Bedtime                  Please bring to all follow up appointments:    1. A copy of your discharge instructions.  2. All medicines you are  "currently taking in their original bottles.  3. Identification and insurance card.    Please arrive 15 minutes ahead of scheduled appointment time.    Please call 24 hours in advance if you must reschedule your appointment and/or time.        Your Scheduled Appointments     Mar 15, 2017 11:30 AM CDT   Non-Fasting Lab with LAB, HEMONC SAME DAY   [facility] ([facility])    [address]  [place] [address]   [phone]            Mar 15, 2017 12:30 PM CDT   Established Patient Visit with MD Rodolfo Valadez - Hematology Oncology ([facility])    [address]  [place] [id number] [phone]            Mar 15, 2017  1:30 PM CDT   Infusion 90 Min with [facility], CHEMO   [facility] ([facility])    [address] [facility] [id number] [phone]                Discharge References/Attachments     VASCULAR ACCESS PORT IMPLANTATION (ENGLISH)    SEDATION, PROCEDURAL (ADULT) (ENGLISH)        Admission Information     Date & Time Provider Department CSN    3/14/2017  7:39 AM Jose Oliva MD [facility] [id number]      Care Providers     Provider Role Specialty Primary office phone    Jose Olvia MD Attending Provider Hematology and Oncology [phone]    [facility] Diagnostic Provider Surgeon  -- Number not on file      Your Vitals Were     BP Pulse Temp Resp Height Weight    153/78 83 96.8 °F (36 °C) (Axillary) 15 5' 4" (1.626 m) 44.5 kg (98 lb)    Last Period SpO2 BMI          (LMP Unknown) 95% 16.82 kg/m2        Recent Lab Values     No lab values to display.      Allergies as of 3/14/2017     No Known Allergies      Advance Directives     An advance directive is a document which, in the event you are no longer able to make decisions for yourself, tells your healthcare team what kind of treatment you do or do not want to receive, or who you would like to make those decisions for you. "  If you do not currently have an advance directive, [facility] encourages you to create one.  For more information call:  [phone]), [phone] ([phone]),  or log on to [email or web address].        Language Assistance Services     ATTENTION: Language assistance services are available, free of charge. Please call [phone].      ATENCIÓN: Si habla rai, tiene a portillo disposición servicios gratuitos de asistencia lingüística. Llame al [phone].     CHÚ Ý: N?u b?n nói Ti?ng Vi?t, có các d?ch v? h? tr? ngôn ng? mi?n phí dành cho b?n. G?i s? [phone].        [facility] Sign-Up     Activating your [facility] account is as easy as 1-2-3!     1) Visit [email or web address], select Sign Up Now, enter this activation code and your date of birth, then select Next.  [email or web address]  Expires: 4/28/2017  9:57 AM      2) Create a username and password to use when you visit [facility] in the future and select a security question in case you lose your password and select Next.    3) Enter your e-mail address and click Sign Up!    Additional Information  If you have questions, please e-mail [email or web address] or call [phone] to talk to our [facility] staff. Remember, [facility] is NOT to be used for urgent needs. For medical emergencies, dial 911.          [facility] complies with applicable Federal civil rights laws and does not discriminate on the basis of race, color, national origin, age, disability, or sex.

## 2017-03-15 NOTE — PLAN OF CARE
Problem: Patient Care Overview  Goal: Plan of Care Review  Outcome: Ongoing (interventions implemented as appropriate)  Patient tolerated Cisplatin and Etoposide treatment. Patient instructed to call MD with any problems and to return to clinic tomorrow at 3:30pm . AVS given and Patient discharged home.

## 2017-03-15 NOTE — DISCHARGE SUMMARY
Radiology Discharge Summary      Hospital Course: No complications    Admit Date: 3/14/2017  Discharge Date: 3/14/17    Instructions Given to Patient: Yes  Diet: regular diet and Resume prior diet  Activity: no heavy lifting, pushing, pulling with the implant side for 2 months    Description of Condition on Discharge: Stable  Vital Signs (Most Recent): Temp: 98.1 °F (36.7 °C) (03/14/17 1000)  Pulse: 79 (03/14/17 1045)  Resp: 16 (03/14/17 1045)  BP: (!) 165/78 (03/14/17 1045)  SpO2: 98 % (03/14/17 1045)    Discharge Disposition: Home    Discharge Diagnosis: lung ca    Ajith Coleman MD  Staff Radiologist  Department of Radiology  Pager: [phone]

## 2017-03-15 NOTE — PLAN OF CARE
Problem: Chemotherapy Effects (Adult)  Goal: Signs and Symptoms of Listed Potential Problems Will be Absent, Minimized or Managed (Chemotherapy Effects)  Signs and symptoms of listed potential problems will be absent, minimized or managed by discharge/transition of care (reference Chemotherapy Effects (Adult) CPG).   Outcome: Ongoing (interventions implemented as appropriate)  SOB with exertion

## 2017-03-15 NOTE — MR AVS SNAPSHOT
Patient Information     Patient Name Sex Licha Hui Female 1955      Visit Information        Provider Department Dept Phone Center    3/15/2017 1:30 PM [facility], CHEMO [facility] Chemotherapy Infusion [phone] Rodolfo Barrios      Patient Instructions     None      Your Current Medications Are     acetaminophen (TYLENOL) 500 MG tablet    albuterol (PROVENTIL) 2.5 mg /3 mL (0.083 %) nebulizer solution    ezetimibe (ZETIA) 10 mg tablet    hydrocodone-acetaminophen 10-325mg (NORCO)  mg Tab    ondansetron (ZOFRAN, AS HYDROCHLORIDE,) 4 MG tablet    predniSONE (DELTASONE) 10 MG tablet    valsartan-hydrochlorothiazide (DIOVAN-HCT) 160-12.5 mg per tablet      Facility-Administered Medications     alteplase injection 2 mg    cisplatin (PLATINOL) 75 mg/m2 = 104 mg in sodium chloride 0.9% 500 mL chemo infusion    etoposide (VEPESID) 100 mg/m2 = 140 mg in sodium chloride 0.9% 500 mL chemo infusion    fosaprepitant 150 mg in sodium chloride 0.9% 150 mL IVPB    heparin, porcine (PF) 100 unit/mL injection flush 500 Units    palonosetron 0.25mg/dexamethasone 10mg IVPB    sodium chloride 0.9% 1,000 mL with magnesium sulfate 1 g, potassium chloride 20 mEq infusion    sodium chloride 0.9% 1,000 mL with magnesium sulfate 1 g, potassium chloride 20 mEq infusion    sodium chloride 0.9% flush 10 mL    cisplatin (PLATINOL) 75 mg/m2 = 113 mg in sodium chloride 0.9% 500 mL chemo infusion (Discontinued)    etoposide (VEPESID) 100 mg/m2 = 150 mg in sodium chloride 0.9% 500 mL chemo infusion (Discontinued)      Appointments for Next Year     3/16/2017  3:30 PM INFUSION 180 MIN (180 min.) [facility], CHEMO    Arrive at check-in approximately 15 minutes before your scheduled appointment time. Bring all outside medical records and imaging, along with a list of your current medications and insurance card.    [facility], 5th Floor    3/17/2017  3:30 PM [facility] 120 MIN (120 min.) [facility], [facility]    Arrive at check-in approximately 15 minutes before your scheduled appointment time. Bring all outside medical records and imaging, along with a list of your current medications and insurance card.    [facility], 5th Floor    3/18/2017  8:45 AM INJECTION (15 min.) [facility], [facility]ION    Arrive at check-in approximately 15 minutes before your scheduled appointment time. Bring all outside medical records and imaging, along with a list of your current medications and insurance card.    [facility], [facility] Floor    4/4/2017  7:40 AM NON FASTING LAB (10 min.) [facility] LAB, HEMON CANCER BLDG    Arrive at check-in approximately 15 minutes before your scheduled appointment time. Bring all outside medical records and imaging, along with a list of your current medications and insurance card.    [address]    4/4/2017  8:45 AM ESTABLISHED PATIENT (30 min.) [place] - Hematology Oncology Jose Oliva MD    Arrive at check-in approximately 15 minutes before your scheduled appointment time. Bring all outside medical records and imaging, along with a list of your current medications and insurance card.    [facility] - [address] [place] [address]    4/4/2017  9:30 AM INFUSION 360 MIN (360 min.) [facility], [facility]    Arrive at check-in approximately 15 minutes before your scheduled appointment time. Bring all outside medical records and imaging, along with a list of your current medications and insurance card.    [facility], [facility] Floor    4/5/2017  8:00 AM INFUSION 180 MIN (180 min.) [facility], CHEMO    Arrive at check-in approximately 15 minutes before your scheduled appointment time. Bring all outside medical records and imaging, along with a list of your current medications and insurance card.    [facility], 5th Floor    4/6/2017  8:00  AM INFUSION 120 MIN (120 min.) [facility], CHEMO    Arrive at check-in approximately 15 minutes before your scheduled appointment time. Bring all outside medical records and imaging, along with a list of your current medications and insurance card.    [facility], 5th Floor         Default Flowsheet Data (last 24 hours)      Amb Complex Vitals Ross        03/15/17 1300 03/15/17 1227             Measurements    Weight  42.6 kg (94 lb)       BP (!)  166/80 (!)  149/74       Temp 98.2 °F (36.8 °C) 98 °F (36.7 °C)       Pulse 80 85       Resp 18 15       SpO2  100 %       Pain Assessment    Pain Score Zero Zero               Allergies     No Known Allergies      Medications You Received from 03/14/2017 1716 to 03/15/2017 1716        Date/Time Order Dose Route Action     03/15/2017 1417 cisplatin (PLATINOL) 75 mg/m2 = 104 mg in sodium chloride 0.9% 500 mL chemo infusion 104 mg Intravenous New Bag     03/15/2017 1524 etoposide (VEPESID) 100 mg/m2 = 140 mg in sodium chloride 0.9% 500 mL chemo infusion 140 mg Intravenous New Bag     03/15/2017 1350 fosaprepitant 150 mg in sodium chloride 0.9% 150 mL IVPB 150 mg Intravenous New Bag     03/15/2017 1729 heparin, porcine (PF) 100 unit/mL injection flush 500 Units 500 Units Intravenous Given     03/15/2017 1329 palonosetron 0.25mg/dexamethasone 10mg IVPB 0.25 mg Intravenous New Bag     03/15/2017 1529 sodium chloride 0.9% 1,000 mL with magnesium sulfate 1 g, potassium chloride 20 mEq infusion   Intravenous New Bag     03/15/2017 1329 sodium chloride 0.9% 1,000 mL with magnesium sulfate 1 g, potassium chloride 20 mEq infusion   Intravenous New Bag     03/15/2017 1729 sodium chloride 0.9% flush 10 mL 10 mL Intravenous Given      Current Discharge Medication List     Cannot display discharge medications since this is not an admission.

## 2017-03-15 NOTE — PROGRESS NOTES
Subjective:       Patient ID: Licha Pop is a 61 y.o. female.    Chief Complaint: No chief complaint on file.      HPI     Ms. Pop returns today for her second cycle of chemotherapy.  Her PET scan fhad shown extensive neck and hilar adenopathy and also two foci in the left iliac bone, hence the initiation of chemotherapy for what appears to be anextensive stage SCLC.    Briefly, she  is a 61-year-old -American female who was recently hospitalized last month with shortness of breath.  Of note, a chest x-ray in early January had showed an opacity in the left upper lobe.  A repeat chest x-ray during her admit showed that the mass had increased in size.  A CT scan of the chest was performed on 02/03/2017 and showed a 3.7 x 3.7 cm mass in the left upper lobe.  There was also a mediastinal soft tissue density mass measuring 5.8 x 7.6 cm.  The patient eventually underwent a biopsy and was subsequently discharged and was given a followup to our clinic.  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  CBc today shows a WBC count of 9,700 /mm3, Hg 12.0 gr/sl, Ht 37.3 % and platelets 377,000 /mm3.  Her creatinine is 0.9 mg/dl with a BUN of 22.    Review of Systems      Overall she feels OK. She tolerated the first cycle of chemotherapy relatively well.  She again complains of SOB with exertion.  She denies any anxiety, depression, easy bruising, fevers, chills, night  sweats, nausea, vomiting, diarrhea, constipation, diplopia, blurred vision, headache, chest pain, palpitations, breast pain, abdominal pain, extremity pain, or difficulty ambulating.   She states that she has lost weight, and she is asking me whether she will be able to regain any.   The remainder of the ten-point ROS, including general, skin, lymph, H/N, cardiorespiratory, GI, , Neuro, Endocrine, and psychiatric is negative.       Objective:      Physical Exam      She  is alert, oriented to time, place, person, pleasant, thin-appearing, in no acute physical distress.    She is accompanied by her son.                               VITAL SIGNS:  Reviewed                                      HEENT:  Alopecia is noted.  There are no nasal, oral, lip, gingival, auricular, lid, or conjunctival lesions.  Mucosae are moist and pink, and there is no thrush.  Pupils are equal, reactive to light and accommodation.              Extraocular muscle movements are intact.  Dentition is fair.  There is no frontal or maxillary tenderness.                                     NECK:  Supple without JVD, adenopathy, or thyromegaly.                       LUNGS:  Clear to auscultation without wheezing, rales, or rhonchi.           CARDIOVASCULAR:  Reveals an S1, S2, no murmurs, no rubs, no gallops.         ABDOMEN:  Soft, slim, nontender, without organomegaly.  Bowel sounds are    present.                                                                     EXTREMITIES:  No cyanosis, clubbing, or edema.                               BREASTS: Deferred                                       LYMPHATIC:  There is no cervical, axillary, or supraclavicular adenopathy.   SKIN:  Warm and moist, without petechiae, rashes, induration, or ecchymoses.  A tattoo is seen on the anterior chest wall.         NEUROLOGIC:  DTRs are 0-1+ bilaterally, symmetrical, motor function is 5/5, and cranial nerves are  within normal limits.        Assessment:       1. Small cell lung cancer, left upper lobe, extensive stage   2. Other emphysema    3.  Diabetes   4. Encounter for chemotherapy     5.    Bone metastases    Plan:        I had a long discussion with her and her son.  She will proceed with her second cycle of chemotherapy for the next 3 days, followed by Neulasta.  I will see her in three weeks with repeat labs for cycle 3.    Her questions were answered to her satisfaction.

## 2017-03-16 NOTE — PLAN OF CARE
Problem: Patient Care Overview  Goal: Discharge Needs Assessment  Outcome: Ongoing (interventions implemented as appropriate)  Pt tolerated etoposide/IVF without adverse effects. VSS. Provided AVS & verbalized understanding of RTC date. DC with family ambulating independently.

## 2017-03-16 NOTE — MR AVS SNAPSHOT
Patient Information     Patient Name Sex Licha Hui Female 1955      Visit Information        [facility]    3/16/2017 3:30 PM [facility], CHEMO [facility] [phone] Rodolfo Barrios      Patient Instructions     None      Your Current Medications Are     acetaminophen (TYLENOL) 500 MG tablet    albuterol (PROVENTIL) 2.5 mg /3 mL (0.083 %) nebulizer solution    ezetimibe (ZETIA) 10 mg tablet    hydrocodone-acetaminophen 10-325mg (NORCO)  mg Tab    ondansetron (ZOFRAN, AS HYDROCHLORIDE,) 4 MG tablet    predniSONE (DELTASONE) 10 MG tablet    valsartan-hydrochlorothiazide (DIOVAN-HCT) 160-12.5 mg per tablet      Facility-Administered Medications     etoposide (VEPESID) 100 mg/m2 = 150 mg in sodium chloride 0.9% 500 mL chemo infusion    heparin, porcine (PF) 100 unit/mL injection flush 500 Units    prochlorperazine injection Soln 10 mg    sodium chloride 0.9% 1,000 mL infusion    sodium chloride 0.9% 1,000 mL with magnesium sulfate 1 g, potassium chloride 20 mEq infusion    sodium chloride 0.9% flush 10 mL    alteplase injection 2 mg (Discontinued)    heparin, porcine (PF) 100 unit/mL injection flush 500 Units (Discontinued)    sodium chloride 0.9% flush 10 mL (Discontinued)      Appointments for Next Year     3/17/2017  3:30 PM INFUSION 120 MIN (120 min.) [facility], [facility]    Arrive at check-in approximately 15 minutes before your scheduled appointment time. Bring all outside medical records and imaging, along with a list of your current medications and insurance card.    [facility], 5th Floor    3/18/2017  8:45 AM INJECTION (15 min.) [facility] INJE[facility], [facility] INFUSION    Arrive at check-in approximately 15 minutes before your scheduled appointment time. Bring all outside medical records and imaging, along with a list of your current medications and insurance card.    [facility], 5th Floor     4/4/2017  7:40 AM NON FASTING LAB (10 min.) [facility] LAB, HEMONC CANCER BLDG    Arrive at check-in approximately 15 minutes before your scheduled appointment time. Bring all outside medical records and imaging, along with a list of your current medications and insurance card.    [facility] 3rd Floor    4/4/2017  8:45 AM ESTABLISHED PATIENT (30 min.) [facility] Hematology Oncology Jose Oliva MD    Arrive at check-in approximately 15 minutes before your scheduled appointment time. Bring all outside medical records and imaging, along with a list of your current medications and insurance card.    [facility] - 3rd Floor    4/4/2017  9:30 AM INFUSION 360 MIN (360 min.) [facility]    Arrive at check-in approximately 15 minutes before your scheduled appointment time. Bring all outside medical records and imaging, along with a list of your current medications and insurance card.    [facility], 5th Floor    4/5/2017  8:00 AM INFUSION 180 MIN (180 min.) [facility], [facility]    Arrive at check-in approximately 15 minutes before your scheduled appointment time. Bring all outside medical records and imaging, along with a list of your current medications and insurance card.    [facility], 5th Floor    4/6/2017  8:00 AM INFUSION 120 MIN (120 min.) [facility], CHEMO    Arrive at check-in approximately 15 minutes before your scheduled appointment time. Bring all outside medical records and imaging, along with a list of your current medications and insurance card.    [facility], 5th Floor         Default Flowsheet Data (last 24 hours)      Amb Complex Vitals Ross        03/16/17 1543                Measurements    BP (!)  182/81        Temp 97.9 °F (36.6 °C)        Pulse 87        Resp 16        Pain Assessment    Pain Score Zero                Allergies     No Known Allergies       Medications You Received from 03/15/2017 1706 to 03/16/2017 1706        Date/Time Order Dose Route Action     03/16/2017 1600 etoposide (VEPESID) 100 mg/m2 = 150 mg in sodium chloride 0.9% 500 mL chemo infusion 150 mg Intravenous New Bag     03/16/2017 1556 sodium chloride 0.9% 1,000 mL infusion   Intravenous New Bag      Current Discharge Medication List     Cannot display discharge medications since this is not an admission.

## 2017-03-16 NOTE — PLAN OF CARE
Problem: Chemotherapy Effects (Adult)  Goal: Signs and Symptoms of Listed Potential Problems Will be Absent, Minimized or Managed (Chemotherapy Effects)  Signs and symptoms of listed potential problems will be absent, minimized or managed by discharge/transition of care (reference Chemotherapy Effects (Adult) CPG).   Outcome: Ongoing (interventions implemented as appropriate)  Labs , hx, and medications reviewed. Assessment completed. Discussed plan of care with patient. Patient in agreement. VSS.  Chair reclined and warm blanket and snack offered. Will cont to monitor

## 2017-03-17 NOTE — MR AVS SNAPSHOT
Patient Information     Patient Name Sex Licha Hui Female 1955      Visit Information        Provider Department Dep Phone Center    3/17/2017 3:30 PM [facility], CHEMO [facility] [phone] Rodolfo Barrios      Patient Instructions     None      Your Current Medications Are     acetaminophen (TYLENOL) 500 MG tablet    albuterol (PROVENTIL) 2.5 mg /3 mL (0.083 %) nebulizer solution    ezetimibe (ZETIA) 10 mg tablet    hydrocodone-acetaminophen 10-325mg (NORCO)  mg Tab    ondansetron (ZOFRAN, AS HYDROCHLORIDE,) 4 MG tablet    predniSONE (DELTASONE) 10 MG tablet    valsartan-hydrochlorothiazide (DIOVAN-HCT) 160-12.5 mg per tablet      Facility-Administered Medications     alteplase injection 2 mg    etoposide (VEPESID) 100 mg/m2 = 150 mg in sodium chloride 0.9% 500 mL chemo infusion    heparin, porcine (PF) 100 unit/mL injection flush 500 Units    prochlorperazine injection Soln 10 mg    sodium chloride 0.9% 1,000 mL infusion    sodium chloride 0.9% 100 mL flush bag    sodium chloride 0.9% flush 10 mL    heparin, porcine (PF) 100 unit/mL injection flush 500 Units (Discontinued)    prochlorperazine injection Soln 10 mg (Discontinued)    sodium chloride 0.9% flush 10 mL (Discontinued)      Appointments for Next Year     3/18/2017  8:45 AM INJECTION (15 min.) [facility] INJECTION, [facility]    Arrive at check-in approximately 15 minutes before your scheduled appointment time. Bring all outside medical records and imaging, along with a list of your current medications and insurance card.    [facility], 5th Floor    2017  7:40 AM NON FASTING LAB (10 min.) [facility], HEMONC CANCER BLDG    Arrive at check-in approximately 15 minutes before your scheduled appointment time. Bring all outside medical records and imaging, along with a list of your current medications and insurance card.    [facility] 3rd Floor     4/4/2017  8:45 AM ESTABLISHED PATIENT (30 min.) [facility] Hematology Oncology Jose Oliva MD    Arrive at check-in approximately 15 minutes before your scheduled appointment time. Bring all outside medical records and imaging, along with a list of your current medications and insurance card.    [facility] - 3rd Floor    4/4/2017  9:30 AM INFUSION 360 MIN (360 min.) [facility], [facility]    Arrive at check-in approximately 15 minutes before your scheduled appointment time. Bring all outside medical records and imaging, along with a list of your current medications and insurance card.    [facility], 5th Floor    4/5/2017  8:00 AM INFUSION 180 MIN (180 min.) [facility], [facility]    Arrive at check-in approximately 15 minutes before your scheduled appointment time. Bring all outside medical records and imaging, along with a list of your current medications and insurance card.    [facility], 5th Floor    4/6/2017  8:00 AM INFUSION 120 MIN (120 min.) [facility]    Arrive at check-in approximately 15 minutes before your scheduled appointment time. Bring all outside medical records and imaging, along with a list of your current medications and insurance card.    [facility], 5th Floor      Allergies     No Known Allergies      Medications You Received from 03/16/2017 1734 to 03/17/2017 1734        Date/Time Order Dose Route Action     03/17/2017 1628 etoposide (VEPESID) 100 mg/m2 = 150 mg in sodium chloride 0.9% 500 mL chemo infusion 150 mg Intravenous New Bag      Current Discharge Medication List     Cannot display discharge medications since this is not an admission.

## 2017-03-17 NOTE — PLAN OF CARE
Problem: Patient Care Overview  Goal: Individualization & Mutuality  1. Warm blankets  2. PIV   Outcome: Ongoing (interventions implemented as appropriate)  1600- Patient arrived ambulatory to the unit for etoposide infusion. Labs , hx, and medications reviewed. Assessment completed and no new issues identified over night. Discussed plan of care with patient. Patient in agreement. Chair reclined and warm blanket and snack offered.

## 2017-03-17 NOTE — PLAN OF CARE
Problem: Patient Care Overview  Goal: Plan of Care Review  Outcome: Ongoing (interventions implemented as appropriate)  1740-Patient tolerated treatment well. Discharged without complaints or S/S of adverse event. AVS given.  Instructed to call provider for any questions or concerns.   Patient verbalized will return to clinic tomorrow morning for injection.

## 2017-03-18 NOTE — NURSING
1026 -- Patient received Neulasta injection in ABD.  Tolerated well.  Patient c/o n/v x 1 time yesterday.  Reports not taking anti-emetic medication.  Strongly encouraged patient take anti-nausea medication rx as needed and call MD if symptoms do not subside.  Patient verbalized understanding.

## 2017-04-04 NOTE — MR AVS SNAPSHOT
Patient Information     Patient Name Sex Licha Chung Female 1955      Visit Information        Provider Department Dep Phone Center    2017 9:30 AM [facility], CHEMO [facility] Chemotherapy Infusion [phone] Rodolfo Barrios      Patient Instructions     None      Your Current Medications Are     acetaminophen (TYLENOL) 500 MG tablet    albuterol (PROVENTIL) 2.5 mg /3 mL (0.083 %) nebulizer solution    ezetimibe (ZETIA) 10 mg tablet    hydrocodone-acetaminophen 10-325mg (NORCO)  mg Tab    ondansetron (ZOFRAN, AS HYDROCHLORIDE,) 4 MG tablet    predniSONE (DELTASONE) 10 MG tablet    valsartan-hydrochlorothiazide (DIOVAN-HCT) 160-12.5 mg per tablet    zolpidem (AMBIEN) 5 MG Tab      Facility-Administered Medications     cisplatin (PLATINOL) 75 mg/m2 = 104 mg in sodium chloride 0.9% 500 mL chemo infusion    etoposide (VEPESID) 100 mg/m2 = 138 mg in sodium chloride 0.9% 500 mL chemo infusion    fosaprepitant 150 mg in sodium chloride 0.9% 150 mL IVPB    heparin, porcine (PF) 100 unit/mL injection flush 500 Units    palonosetron 0.25mg/dexamethasone 10mg IVPB    sodium chloride 0.9% 1,000 mL with magnesium sulfate 1 g, potassium chloride 20 mEq infusion    sodium chloride 0.9% 1,000 mL with magnesium sulfate 1 g, potassium chloride 20 mEq infusion    sodium chloride 0.9% flush 10 mL    cisplatin (PLATINOL) 75 mg/m2 = 113 mg in sodium chloride 0.9% 500 mL chemo infusion (Discontinued)      Appointments for Next Year     2017  8:00 AM INFUSION 180 MIN (180 min.) [facility]    Arrive at check-in approximately 15 minutes before your scheduled appointment time. Bring all outside medical records and imaging, along with a list of your current medications and insurance card.    [facility], 5th Floor    2017  8:00 AM INFUSION 120 MIN (120 min.) [facility]    Arrive at check-in approximately 15 minutes before your scheduled  appointment time. Bring all outside medical records and imaging, along with a list of your current medications and insurance card.    [facility], 5th Floor    4/7/2017 10:45 AM INJECTION (15 min.) [facility]CTION, [facility]    Arrive at check-in approximately 15 minutes before your scheduled appointment time. Bring all outside medical records and imaging, along with a list of your current medications and insurance card.    [facility], 5th Floor    4/25/2017  7:10 AM NON FASTING LAB (10 min.) [facility] LAB, HEMON CANCER BLDG    Arrive at check-in approximately 15 minutes before your scheduled appointment time. Bring all outside medical records and imaging, along with a list of your current medications and insurance card.    [facility] 3rd Floor    4/25/2017  8:15 AM ESTABLISHED PATIENT (30 min.) [facility] Hematology Oncology Jose Oliva MD    Arrive at check-in approximately 15 minutes before your scheduled appointment time. Bring all outside medical records and imaging, along with a list of your current medications and insurance card.    [facility] - 3rd Floor    4/25/2017  9:00 AM INFUSION 360 MIN (360 min.) [facility], [facility]    Arrive at check-in approximately 15 minutes before your scheduled appointment time. Bring all outside medical records and imaging, along with a list of your current medications and insurance card.    [facility], 5th Floor    4/26/2017  8:00 AM INFUSION 180 MIN (180 min.) [facility], [facility]    Arrive at check-in approximately 15 minutes before your scheduled appointment time. Bring all outside medical records and imaging, along with a list of your current medications and insurance card.    [facility], 5th Floor    4/27/2017  8:00 AM INFUSION 120 MIN (120 min.) [facility]    Arrive at check-in  "approximately 15 minutes before your scheduled appointment time. Bring all outside medical records and imaging, along with a list of your current medications and insurance card.    [facility], 5th Floor    4/28/2017  9:00 AM INJECTION (15 min.) [facility] INJECTION, [facility]    Arrive at check-in approximately 15 minutes before your scheduled appointment time. Bring all outside medical records and imaging, along with a list of your current medications and insurance card.    [facility], 5th Floor         Default Flowsheet Data (last 24 hours)      Amb Complex Vitals Ross        04/04/17 1746 04/04/17 1045 04/04/17 1011          Measurements    Weight   41 kg (90 lb 6.4 oz)      Height   5' 6" (1.676 m)      BSA (Calculated - sq m)   1.38 sq meters      BMI (Calculated)   14.6      BP (!)  192/88   end v/s; pt will take b/p med at home (!)  190/85   MD notified and advised to proceed with treatment (!)  186/82      Temp  98.2 °F (36.8 °C) 98 °F (36.7 °C)      Pulse 99 86 91      Resp  18 18      SpO2   (!)  92 %      Pain Assessment    Pain Score  Zero Zero              Allergies     No Known Allergies      Medications You Received from 04/03/2017 1751 to 04/04/2017 1751        Date/Time Order Dose Route Action     04/04/2017 1337 cisplatin (PLATINOL) 75 mg/m2 = 104 mg in sodium chloride 0.9% 500 mL chemo infusion 104 mg Intravenous New Bag     04/04/2017 1443 etoposide (VEPESID) 100 mg/m2 = 138 mg in sodium chloride 0.9% 500 mL chemo infusion 138 mg Intravenous New Bag     04/04/2017 1747 heparin, porcine (PF) 100 unit/mL injection flush 500 Units 500 Units Intravenous Given     04/04/2017 1321 palonosetron 0.25mg/dexamethasone 10mg IVPB 0.25 mg Intravenous New Bag     04/04/2017 1104 sodium chloride 0.9% 1,000 mL with magnesium sulfate 1 g, potassium chloride 20 mEq infusion   Intravenous New Bag     04/04/2017 1545 sodium chloride 0.9% 1,000 mL with magnesium sulfate 1 g, " potassium chloride 20 mEq infusion   Intravenous New Bag     04/04/2017 1747 sodium chloride 0.9% flush 10 mL 10 mL Intravenous Given      Current Discharge Medication List     Cannot display discharge medications since this is not an admission.

## 2017-04-04 NOTE — PLAN OF CARE
Problem: Patient Care Overview  Goal: Individualization & Mutuality  1. Warm blankets  2. PIV   Outcome: Ongoing (interventions implemented as appropriate)  1047-Labs , hx, and medications reviewed. Assessment completed. Discussed plan of care with patient. Patient in agreement. Chair reclined and warm blanket and snack offered.

## 2017-04-04 NOTE — PLAN OF CARE
Problem: Patient Care Overview  Goal: Discharge Needs Assessment  Outcome: Ongoing (interventions implemented as appropriate)  7623-Patient tolerated treatment well. Discharged without complaints or S/S of adverse event. AVS given.  Instructed to call provider for any questions or concerns.

## 2017-04-04 NOTE — PROGRESS NOTES
Subjective:       Patient ID: Licha Pop is a 61 y.o. female.    Chief Complaint: No chief complaint on file.      HPI     Ms. Pop returns today for her third cycle of chemotherapy.  Her PET scan fhad shown extensive neck and hilar adenopathy and also two foci in the left iliac bone, hence the initiation of chemotherapy for what appears to be anextensive stage SCLC.    Briefly, she  is a 61-year-old -American female who was recently hospitalized last month with shortness of breath.  Of note, a chest x-ray in early January had showed an opacity in the left upper lobe.  A repeat chest x-ray during her admit showed that the mass had increased in size.  A CT scan of the chest was performed on 02/03/2017 and showed a 3.7 x 3.7 cm mass in the left upper lobe.  There was also a mediastinal soft tissue density mass measuring 5.8 x 7.6 cm.  The patient eventually underwent a biopsy and was subsequently discharged and was given a followup to our clinic.  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    CBc today shows a WBC count of 11,990 /mm3, Hg 10.9 gr/sl, Ht 33.3 % and platelets 287,000 /mm3.  Her creatinine is 0.9 mg/dl and her bilirubin is 0.1 mg/dl.    Review of Systems      Overall she feels OK. She tolerated the second cycle of chemotherapy relatively well.  She again complains of SOB with exertion and also of poor appetite and weight loss..  She denies any anxiety, depression, easy bruising, fevers, chills, night  sweats, nausea, vomiting, diarrhea, constipation, diplopia, blurred vision, headache, chest pain, palpitations, breast pain, abdominal pain, extremity pain, or difficulty ambulating.   The remainder of the ten-point ROS, including general, skin, lymph, H/N, cardiorespiratory, GI, , Neuro, Endocrine, and psychiatric is negative.       Objective:      Physical Exam      She is alert, oriented to time, place,  person, pleasant, thin-appearing, in no acute physical distress.    She is accompanied by her son.                               VITAL SIGNS:  Reviewed                                      HEENT:  Alopecia is noted.  There are no nasal, oral, lip, gingival, auricular, lid, or conjunctival lesions.  Mucosae are moist and pink, and there is no thrush.  Pupils are equal, reactive to light and accommodation.              Extraocular muscle movements are intact.  Dentition is fair.  There is no frontal or maxillary tenderness.                                     NECK:  Supple without JVD, adenopathy, or thyromegaly.                       LUNGS:  Clear to auscultation without wheezing, rales, or rhonchi.           CARDIOVASCULAR:  Reveals an S1, S2, no murmurs, no rubs, no gallops.         ABDOMEN:  Soft, slim, nontender, without organomegaly.  Bowel sounds are    present.                                                                     EXTREMITIES:  No cyanosis, clubbing, or edema.                               BREASTS: Deferred                                       LYMPHATIC:  There is no cervical, axillary, or supraclavicular adenopathy.   SKIN:  Warm and moist, without petechiae, rashes, induration, or ecchymoses.  A tattoo is seen on the anterior chest wall.         NEUROLOGIC:  DTRs are 0-1+ bilaterally, symmetrical, motor function is 5/5, and cranial nerves are  within normal limits.        Assessment:       1. Small cell lung cancer, left upper lobe, extensive stage   2. Other emphysema    3.  Diabetes   4. Encounter for chemotherapy     5.    Bone metastases    Plan:        I had a long discussion with her and her son.  She will proceed with her third cycle of chemotherapy for the next 3 days, followed by Neulasta.  I will see her in three weeks with repeat labs for cycle 4.    Her questions were answered to her satisfaction.

## 2017-04-05 NOTE — PLAN OF CARE
Problem: Patient Care Overview  Goal: Plan of Care Review  Outcome: Ongoing (interventions implemented as appropriate)  Tolerated chemo infusion without difficulty. No complaints voiced. AVS given to pt. Instructed to notify MD with any concerns or problems.  Pt to return in am for day 3 chemo

## 2017-04-05 NOTE — MR AVS SNAPSHOT
Patient Information     Patient Name Sex Licha Hui Female 1955      Visit Information        Provider Department Dept Phone Center    2017 8:00 AM [facility], CHEMO [facility] Chemotherapy Infusion [phone] Rodolfo Barrios      Patient Instructions     None      Your Current Medications Are     acetaminophen (TYLENOL) 500 MG tablet    albuterol (PROVENTIL) 2.5 mg /3 mL (0.083 %) nebulizer solution    ezetimibe (ZETIA) 10 mg tablet    hydrocodone-acetaminophen 10-325mg (NORCO)  mg Tab    ondansetron (ZOFRAN, AS HYDROCHLORIDE,) 4 MG tablet    predniSONE (DELTASONE) 10 MG tablet    valsartan-hydrochlorothiazide (DIOVAN-HCT) 160-12.5 mg per tablet    zolpidem (AMBIEN) 5 MG Tab      Facility-Administered Medications     cisplatin (PLATINOL) 75 mg/m2 = 104 mg in sodium chloride 0.9% 500 mL chemo infusion    etoposide (VEPESID) 100 mg/m2 = 138 mg in sodium chloride 0.9% 500 mL chemo infusion    etoposide (VEPESID) 100 mg/m2 = 138 mg in sodium chloride 0.9% 500 mL chemo infusion    heparin, porcine (PF) 100 unit/mL injection flush 500 Units    palonosetron 0.25mg/dexamethasone 10mg IVPB    sodium chloride 0.9% 1,000 mL infusion    sodium chloride 0.9% 1,000 mL with magnesium sulfate 1 g, potassium chloride 20 mEq infusion    sodium chloride 0.9% 1,000 mL with magnesium sulfate 1 g, potassium chloride 20 mEq infusion    sodium chloride 0.9% flush 10 mL    fosaprepitant 150 mg in sodium chloride 0.9% 150 mL IVPB (Discontinued)    heparin, porcine (PF) 100 unit/mL injection flush 500 Units (Discontinued)    sodium chloride 0.9% flush 10 mL (Discontinued)      Appointments for Next Year     2017  8:00 AM INFUSION 120 MIN (120 min.) [facility], CHEMO    Arrive at check-in approximately 15 minutes before your scheduled appointment time. Bring all outside medical records and imaging, along with a list of your current medications and insurance card.    [facility], 5th  Floor    4/7/2017 10:45 AM INJECTION (15 min.) [facility]    Arrive at check-in approximately 15 minutes before your scheduled appointment time. Bring all outside medical records and imaging, along with a list of your current medications and insurance card.    [facility], 5th Floor    4/25/2017  7:10 AM NON FASTING LAB (10 min.) [facility] LAB, HEMONC CANCER BLDG    Arrive at check-in approximately 15 minutes before your scheduled appointment time. Bring all outside medical records and imaging, along with a list of your current medications and insurance card.    [facility] 3rd Floor    4/25/2017  8:15 AM ESTABLISHED PATIENT (30 min.) [place] - Hematology Oncology Jose Oliva MD    Arrive at check-in approximately 15 minutes before your scheduled appointment time. Bring all outside medical records and imaging, along with a list of your current medications and insurance card.    [facility] Floor    4/25/2017  9:00 AM INFUSION 360 MIN (360 min.) [facility], CHE[facility]    Arrive at check-in approximately 15 minutes before your scheduled appointment time. Bring all outside medical records and imaging, along with a list of your current medications and insurance card.    [facility], 5th Floor    4/26/2017  8:00 AM INFUSION 180 MIN (180 min.) [facility], [facility]    Arrive at check-in approximately 15 minutes before your scheduled appointment time. Bring all outside medical records and imaging, along with a list of your current medications and insurance card.    [facility], 5th Floor    4/27/2017  8:00 AM INFUSION 120 MIN (120 min.) [facility], CHE[facility]    Arrive at check-in approximately 15 minutes before your scheduled appointment time. Bring all outside medical records and imaging, along with a list of your current medications and  insurance card.    [facility], 5th Floor    4/28/2017  9:00 AM INJECTION (15 min.) [facility] INJECTION, [facility]    Arrive at check-in approximately 15 minutes before your scheduled appointment time. Bring all outside medical records and imaging, along with a list of your current medications and insurance card.    [facility], 5th Floor         Default Flowsheet Data (last 24 hours)      Amb Complex Vitals Ross        04/05/17 0820 04/04/17 1746             Measurements    BP (!)  171/77 (!)  192/88   end v/s; pt will take b/p med at home       Temp 97.7 °F (36.5 °C)        Pulse 77 99       Resp 20        Pain Assessment    Pain Score Zero                Allergies     No Known Allergies      Medications You Received from 04/04/2017 1107 to 04/05/2017 1107        Date/Time Order Dose Route Action     04/05/2017 0916 etoposide (VEPESID) 100 mg/m2 = 138 mg in sodium chloride 0.9% 500 mL chemo infusion 138 mg Intravenous New Bag     04/05/2017 0816 sodium chloride 0.9% 1,000 mL infusion   Intravenous New Bag      Current Discharge Medication List     Cannot display discharge medications since this is not an admission.

## 2017-05-02 NOTE — PROGRESS NOTES
Subjective:       Patient ID: Licha Pop is a 62 y.o. female.    Chief Complaint: No chief complaint on file.      HPI     Ms. Pop returns today for her fourth cycle of chemotherapy.  Her PET scan fhad shown extensive neck and hilar adenopathy and also two foci in the left iliac bone, hence the initiation of chemotherapy for what appears to be an extensive stage SCLC.    Briefly, she  is a 62-year-old -American female who was recently hospitalized last month with shortness of breath.  Of note, a chest x-ray in early January had showed an opacity in the left upper lobe.  A repeat chest x-ray during her admit showed that the mass had increased in size.  A CT scan of the chest was performed on 02/03/2017 and showed a 3.7 x 3.7 cm mass in the left upper lobe.  There was also a mediastinal soft tissue density mass measuring 5.8 x 7.6 cm.  The patient eventually underwent a biopsy and was subsequently discharged and was given a followup to our clinic.  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    CBc today shows a WBC count of 7,000 /mm3, Hg 12.4 gr/sl, Ht 39.1 % and platelets 281,000 /mm3.  Her creatinine is 0.8 mg/dl and her bilirubin is 0.3 mg/dl.    Review of Systems      Overall she feels OK. She tolerated the third cycle of chemotherapy relatively well.  She again complains of SOB with exertion and also of poor appetite.  She denies any anxiety, depression, easy bruising, fevers, chills, night  sweats, nausea, vomiting, diarrhea, constipation, diplopia, blurred vision, headache, chest pain, palpitations, breast pain, abdominal pain, extremity pain, or difficulty ambulating.   The remainder of the ten-point ROS, including general, skin, lymph, H/N, cardiorespiratory, GI, , Neuro, Endocrine, and psychiatric is negative.       Objective:      Physical Exam      She is alert, oriented to time, place, person, pleasant,  thin-appearing, in no acute physical distress.    She is accompanied by her son.                               VITAL SIGNS:  Reviewed                                      HEENT:  Alopecia is noted.  There are no nasal, oral, lip, gingival, auricular, lid, or conjunctival lesions.  Mucosae are moist and pink, and there is no thrush.  Pupils are equal, reactive to light and accommodation.              Extraocular muscle movements are intact.  Dentition is fair.  There is no frontal or maxillary tenderness.                                     NECK:  Supple without JVD, adenopathy, or thyromegaly.                       LUNGS:  Clear to auscultation without wheezing, rales, or rhonchi.           CARDIOVASCULAR:  Reveals an S1, S2, no murmurs, no rubs, no gallops.         ABDOMEN:  Soft, slim, nontender, without organomegaly.  Bowel sounds are    present.                                                                     EXTREMITIES:  No cyanosis, clubbing, or edema.                               BREASTS: Deferred                                       LYMPHATIC:  There is no cervical, axillary, or supraclavicular adenopathy.   SKIN:  Warm and moist, without petechiae, rashes, induration, or ecchymoses.  A tattoo is seen on the anterior chest wall.         NEUROLOGIC:  DTRs are 0-1+ bilaterally, symmetrical, motor function is 5/5, and cranial nerves are  within normal limits.        Assessment:       1. Small cell lung cancer, left upper lobe, extensive stage   2. Other emphysema    3.  Diabetes   4. Encounter for chemotherapy     5.    Bone metastases    Plan:        I had a long discussion with her and her son.  She will proceed with her fourth cycle of chemotherapy for the next 3 days, followed by Neulasta.  I will see her in three weeks with repeat labs for cycle 5.  Upon completion of 6 cycles of chemotherapy she will be referred for radiation therapy.    Her questions were answered to her satisfaction.

## 2017-05-02 NOTE — MR AVS SNAPSHOT
Patient Information     Patient Name Sex Licha Hui Female 1955      Visit Information        Provider Department Dep Phone Center    2017 9:00 AM [facility], [facility] [phone] Rodolfo Barrios      Patient Instructions      Discharge Instructions for Chemotherapy  Your healthcare provider prescribed a type of medicine therapy for you called chemotherapy. Healthcare providers prescribe chemotherapy for many different types of illnesses, including cancer. There are many types of chemotherapy. This sheet provides general guidelines on how you can take care of yourself after your chemotherapy.  Mouth care  Dont be discouraged if you get mouth sores, even if you are following all your healthcare providers instructions. Many people get mouth sores as a side effect of chemotherapy. Heres what you can do to prevent mouth sores:  · Keep your mouth clean. Brush your teeth with a soft-bristle toothbrush after every meal.  · Ask if you should use a toothpaste with fluoride, or a mixture of 1 teaspoon of salt in 8-ounces of water to brush your teeth.   · Use an oral swab or special soft toothbrush if your gums bleed during regular brushing.  · Don't use dental floss if it causes your gums to bleed.  · Use any mouthwashes given to you as directed.  · If you cant tolerate regular methods, use salt and baking soda to clean your mouth. Mix 1 teaspoon of salt and 1 teaspoon of baking soda into an 8-ounce glass of warm water. Swish and spit.  · If you wear dentures, you may be told to wear them only when you eat, ask your healthcare provider. Clean dentures twice a day and soak in antimicrobial solution when you aren't wearing them. Rinse your mouth after each meal.   · Watch your mouth and tongue for white patches. This may be a sign of a type of yeast infection (thrush), a common side effect of chemotherapy. Be sure to tell your healthcare provider about these patches. Medicine  can be prescribed to treat it.  Other home care  Here's what else you can do:  · Try to exercise. Exercise keeps you strong and keeps your heart and lungs active. Walking and yoga are good types of exercise.   · Keep clean. During chemotherapy, your body cant fight infection very well. Take short baths or showers.  ¨ Wash your hands before you eat and after going to the bathroom.  ¨ Use moisturizing soap. Chemotherapy can make your skin dry.  ¨ Apply moisturizing lotion several times a day to help relieve dry skin.  ¨ Dont take very hot or very cold showers or baths.  · Dont be surprised if your chemotherapy causes slight burns to your skin--usually on the hands and feet. Some medicines used in high doses cause this to happen. Ask for a special cream to help relieve the burn and protect your skin.  · Avoid people who are sick with illnesses and diseases you could catch, such as colds, flu, measles, or chicken pox as well as people who have recently had vaccinations for these illnesses.   · Let your healthcare provider know if your throat is sore. You may have an infection that needs treatment.  · Remember, many patients feel sick and lose their appetites during treatment. Eat small meals several times a day to keep your strength up:  ¨ Choose bland foods with little taste or smell if you are reacting strongly to food.  ¨ Be sure to cook all food thoroughly. This kills bacteria and helps you avoid infection.  ¨ Eat foods that are soft. Soft foods are less likely to cause stomach irritation.  ¨ Try to eat a variety of foods for a well-balanced diet. Drink plenty of fluids and eat foods with fiber to avoid constipation.      When to call your healthcare provider  Call your healthcare provider right away if you have any of the following:  · Unexplained bleeding  · Trouble concentrating  · Ongoing fatigue  · Shortness of breath, wheezing, trouble breathing, or bad cough  · Rapid, irregular heartbeat, or chest  pain  · Dizziness, lightheadedness  · Constant feeling of being cold  · Hives or a cut or rash that swells, turns red, feels hot or painful, or begins to ooze  · Burning when you urinate  · Fever of 100.4°F (38°C) or higher, or chills   Date Last Reviewed: 5/1/2016 © 2000-2016 [employer]. [address] [place] [address], [place] [address]. All rights reserved. This information is not intended as a substitute for professional medical care. Always follow your healthcare professional's instructions.             Your Current Medications Are     acetaminophen (TYLENOL) 500 MG tablet    albuterol (PROVENTIL) 2.5 mg /3 mL (0.083 %) nebulizer solution    ezetimibe (ZETIA) 10 mg tablet    hydrocodone-acetaminophen 10-325mg (NORCO)  mg Tab    ondansetron (ZOFRAN, AS HYDROCHLORIDE,) 4 MG tablet    predniSONE (DELTASONE) 10 MG tablet    valsartan-hydrochlorothiazide (DIOVAN-HCT) 160-12.5 mg per tablet    zolpidem (AMBIEN) 5 MG Tab      Facility-Administered Medications     cisplatin (PLATINOL) 75 mg/m2 = 104 mg in sodium chloride 0.9% 500 mL chemo infusion    etoposide (VEPESID) 100 mg/m2 = 138 mg in sodium chloride 0.9% 500 mL chemo infusion    fosaprepitant 150 mg in sodium chloride 0.9% 150 mL IVPB    heparin, porcine (PF) 100 unit/mL injection flush 500 Units    palonosetron 0.25mg/dexamethasone 10mg IVPB    sodium chloride 0.9% 1,000 mL with magnesium sulfate 1 g, potassium chloride 20 mEq infusion    sodium chloride 0.9% bolus 1,000 mL    sodium chloride 0.9% flush 10 mL      Appointments for Next Year     5/3/2017  8:00 AM INFUSION 180 MIN (180 min.) [facility], [facility]    Arrive at check-in approximately 15 minutes before your scheduled appointment time. Bring all outside medical records and imaging, along with a list of your current medications and insurance card.    [facility], 5th Floor    5/4/2017  8:00 AM [facility] 120 MIN (120 min.) [facility]    Arrive at check-in approximately 15 minutes before your scheduled appointment time. Bring all outside medical records and imaging, along with a list of your current medications and insurance card.    [facility], 5th Floor    5/5/2017  8:30 AM INJECTION (15 min.) [facility], [facility]    Arrive at check-in approximately 15 minutes before your scheduled appointment time. Bring all outside medical records and imaging, along with a list of your current medications and insurance card.    [facility], [address] [place] [address]    5/22/2017  7:10 AM NON FASTING LAB (10 min.) [facility] LAB, HEMON CANCER BLDG    Arrive at check-in approximately 15 minutes before your scheduled appointment time. Bring all outside medical records and imaging, along with a list of your current medications and insurance card.    [facility] 3rd Floor    5/22/2017  8:15 AM ESTABLISHED PATIENT (30 min.) [facility] On[facility] Oliva MD    Arrive at check-in approximately 15 minutes before your scheduled appointment time. Bring all outside medical records and imaging, along with a list of your current medications and insurance card.    [facility] - 3rd Floor    5/23/2017  8:00 AM INFUSION 360 MIN (360 min.) [facility], CHEMO    Arrive at check-in approximately 15 minutes before your scheduled appointment time. Bring all outside medical records and imaging, along with a list of your current medications and insurance card.    [facility], 5th Floor    5/24/2017  8:00 AM INFUSION 180 MIN (180 min.) [facility], CHEMO    Arrive at check-in approximately 15 minutes before your scheduled appointment time. Bring all outside medical records and imaging, along with a list of your current medications and insurance card.    [facility], 5th Floor    5/25/2017  8:00 AM INFUSION 120 MIN (120  "min.) [facility], CHEMO    Arrive at check-in approximately 15 minutes before your scheduled appointment time. Bring all outside medical records and imaging, along with a list of your current medications and insurance card.    [facility], 5th Floor    5/26/2017  8:30 AM INJECTION (15 min.) [facility] INJECTION, [facility]    Arrive at check-in approximately 15 minutes before your scheduled appointment time. Bring all outside medical records and imaging, along with a list of your current medications and insurance card.    [facility], 5th Floor         Default Flowsheet Data (last 24 hours)      Amb Complex Vitals Ross        05/02/17 1731 05/02/17 1003             Measurements    Weight  42 kg (92 lb 9.6 oz)       Height  5' 5" (1.651 m)       BSA (Calculated - sq m)  1.39 sq meters       BMI (Calculated)  15.4       BP (!)  165/85 (!)  142/64       Temp  98.2 °F (36.8 °C)       Pulse 88 89       Resp 18 18       SpO2  99 %       Pain Assessment    Pain Score Zero Zero               Allergies     No Known Allergies      Medications You Received from 05/01/2017 1732 to 05/02/2017 1732        Date/Time Order Dose Route Action     05/02/2017 1409 cisplatin (PLATINOL) 75 mg/m2 = 104 mg in sodium chloride 0.9% 500 mL chemo infusion 104 mg Intravenous New Bag     05/02/2017 1510 etoposide (VEPESID) 100 mg/m2 = 138 mg in sodium chloride 0.9% 500 mL chemo infusion 138 mg Intravenous New Bag     05/02/2017 1326 fosaprepitant 150 mg in sodium chloride 0.9% 150 mL IVPB 150 mg Intravenous New Bag     05/02/2017 1727 heparin, porcine (PF) 100 unit/mL injection flush 500 Units 500 Units Intravenous Given     05/02/2017 1342 palonosetron 0.25mg/dexamethasone 10mg IVPB 0.25 mg Intravenous New Bag     05/02/2017 1142 sodium chloride 0.9% 1,000 mL with magnesium sulfate 1 g, potassium chloride 20 mEq infusion   Intravenous New Bag     05/02/2017 1610 sodium chloride " 0.9% bolus 1,000 mL 1,000 mL Intravenous New Bag     05/02/2017 1727 sodium chloride 0.9% flush 10 mL 10 mL Intravenous Given      Current Discharge Medication List     Cannot display discharge medications since this is not an admission.

## 2017-05-02 NOTE — NURSING
Patient reported being unaware of length of treatment on Day 1 of regimen. Regimen days and length of treatments instruction given; needs reinforcement.  Post hydration with electrolytes to be given on Day 2 instead of Day 1 for time constraints.

## 2017-05-02 NOTE — PLAN OF CARE
Problem: Patient Care Overview  Goal: Plan of Care Review  Outcome: Ongoing (interventions implemented as appropriate)  Pt. Tolerated treatment well. Discharged without complaints or signs of adverse effects. To return tomorrow for D2 chemo and IVFs.

## 2017-05-02 NOTE — PATIENT INSTRUCTIONS
Discharge Instructions for Chemotherapy  Your healthcare provider prescribed a type of medicine therapy for you called chemotherapy. Healthcare providers prescribe chemotherapy for many different types of illnesses, including cancer. There are many types of chemotherapy. This sheet provides general guidelines on how you can take care of yourself after your chemotherapy.  Mouth care  Dont be discouraged if you get mouth sores, even if you are following all your healthcare providers instructions. Many people get mouth sores as a side effect of chemotherapy. Heres what you can do to prevent mouth sores:  · Keep your mouth clean. Brush your teeth with a soft-bristle toothbrush after every meal.  · Ask if you should use a toothpaste with fluoride, or a mixture of 1 teaspoon of salt in 8-ounces of water to brush your teeth.   · Use an oral swab or special soft toothbrush if your gums bleed during regular brushing.  · Don't use dental floss if it causes your gums to bleed.  · Use any mouthwashes given to you as directed.  · If you cant tolerate regular methods, use salt and baking soda to clean your mouth. Mix 1 teaspoon of salt and 1 teaspoon of baking soda into an 8-ounce glass of warm water. Swish and spit.  · If you wear dentures, you may be told to wear them only when you eat, ask your healthcare provider. Clean dentures twice a day and soak in antimicrobial solution when you aren't wearing them. Rinse your mouth after each meal.   · Watch your mouth and tongue for white patches. This may be a sign of a type of yeast infection (thrush), a common side effect of chemotherapy. Be sure to tell your healthcare provider about these patches. Medicine can be prescribed to treat it.  Other home care  Here's what else you can do:  · Try to exercise. Exercise keeps you strong and keeps your heart and lungs active. Walking and yoga are good types of exercise.   · Keep clean. During chemotherapy, your body cant fight  infection very well. Take short baths or showers.  ¨ Wash your hands before you eat and after going to the bathroom.  ¨ Use moisturizing soap. Chemotherapy can make your skin dry.  ¨ Apply moisturizing lotion several times a day to help relieve dry skin.  ¨ Dont take very hot or very cold showers or baths.  · Dont be surprised if your chemotherapy causes slight burns to your skin--usually on the hands and feet. Some medicines used in high doses cause this to happen. Ask for a special cream to help relieve the burn and protect your skin.  · Avoid people who are sick with illnesses and diseases you could catch, such as colds, flu, measles, or chicken pox as well as people who have recently had vaccinations for these illnesses.   · Let your healthcare provider know if your throat is sore. You may have an infection that needs treatment.  · Remember, many patients feel sick and lose their appetites during treatment. Eat small meals several times a day to keep your strength up:  ¨ Choose bland foods with little taste or smell if you are reacting strongly to food.  ¨ Be sure to cook all food thoroughly. This kills bacteria and helps you avoid infection.  ¨ Eat foods that are soft. Soft foods are less likely to cause stomach irritation.  ¨ Try to eat a variety of foods for a well-balanced diet. Drink plenty of fluids and eat foods with fiber to avoid constipation.      When to call your healthcare provider  Call your healthcare provider right away if you have any of the following:  · Unexplained bleeding  · Trouble concentrating  · Ongoing fatigue  · Shortness of breath, wheezing, trouble breathing, or bad cough  · Rapid, irregular heartbeat, or chest pain  · Dizziness, lightheadedness  · Constant feeling of being cold  · Hives or a cut or rash that swells, turns red, feels hot or painful, or begins to ooze  · Burning when you urinate  · Fever of 100.4°F (38°C) or higher, or chills   Date Last Reviewed: 5/1/2016  ©  5947-1841 [employer]. [address] [place] [address], [place] [address]. All rights reserved. This information is not intended as a substitute for professional medical care. Always follow your healthcare professional's instructions.

## 2017-05-03 NOTE — PLAN OF CARE
Problem: Patient Care Overview  Goal: Plan of Care Review  Outcome: Ongoing (interventions implemented as appropriate)  Patient tolerated etoposide treatment with vital signs stable. Patient instructed to call MD with any problems and to return to clinic tomorrow . AVS given and Patient discharged home.

## 2017-05-03 NOTE — MR AVS SNAPSHOT
Patient Information     Patient Name Sex Licha Hui Female 1955      Visit Information        Provider Department Dept Phone Center    5/3/2017 8:00 AM [facility], CHEMO [facility] Chemotherapy Infusion [phone] Rodolfo Barrios      Patient Instructions     None      Your Current Medications Are     acetaminophen (TYLENOL) 500 MG tablet    albuterol (PROVENTIL) 2.5 mg /3 mL (0.083 %) nebulizer solution    ezetimibe (ZETIA) 10 mg tablet    hydrocodone-acetaminophen 10-325mg (NORCO)  mg Tab    ondansetron (ZOFRAN, AS HYDROCHLORIDE,) 4 MG tablet    predniSONE (DELTASONE) 10 MG tablet    valsartan-hydrochlorothiazide (DIOVAN-HCT) 160-12.5 mg per tablet    zolpidem (AMBIEN) 5 MG Tab      Facility-Administered Medications     alteplase injection 2 mg    cisplatin (PLATINOL) 75 mg/m2 = 104 mg in sodium chloride 0.9% 500 mL chemo infusion    etoposide (VEPESID) 100 mg/m2 = 138 mg in sodium chloride 0.9% 500 mL chemo infusion    etoposide (VEPESID) 100 mg/m2 = 138 mg in sodium chloride 0.9% 500 mL chemo infusion    fosaprepitant 150 mg in sodium chloride 0.9% 150 mL IVPB    heparin, porcine (PF) 100 unit/mL injection flush 500 Units    palonosetron 0.25mg/dexamethasone 10mg IVPB    prochlorperazine injection Soln 10 mg    sodium chloride 0.9% 1,000 mL with magnesium sulfate 1 g, potassium chloride 20 mEq infusion    sodium chloride 0.9% 1,000 mL with magnesium sulfate 1 g, potassium chloride 20 mEq infusion    sodium chloride 0.9% bolus 1,000 mL    sodium chloride 0.9% flush 10 mL    heparin, porcine (PF) 100 unit/mL injection flush 500 Units (Discontinued)    sodium chloride 0.9% flush 10 mL (Discontinued)      Appointments for Next Year     2017  8:00 AM INFUSION 120 MIN (120 min.) [facility], CHEMO    Arrive at check-in approximately 15 minutes before your scheduled appointment time. Bring all outside medical records and imaging, along with a list of your current  medications and insurance card.    [facility], 5th Floor    5/5/2017  8:30 AM INJECTION (15 min.) [facility], [facility]ION    Arrive at check-in approximately 15 minutes before your scheduled appointment time. Bring all outside medical records and imaging, along with a list of your current medications and insurance card.    [facility], [facility] Floor    5/22/2017  7:10 AM NON FASTING LAB (10 min.) [facility] LAB, HEMON CANCER BLDG    Arrive at check-in approximately 15 minutes before your scheduled appointment time. Bring all outside medical records and imaging, along with a list of your current medications and insurance card.    [address] Floor    5/22/2017  8:15 AM ESTABLISHED PATIENT (30 min.) [place] - Hematology Oncology Jose Oliva MD    Arrive at check-in approximately 15 minutes before your scheduled appointment time. Bring all outside medical records and imaging, along with a list of your current medications and insurance card.    [address]    5/23/2017  8:00 AM INFUSION 360 MIN (360 min.) [facility], CHEMO    Arrive at check-in approximately 15 minutes before your scheduled appointment time. Bring all outside medical records and imaging, along with a list of your current medications and insurance card.    [facility], [facility] Floor    5/24/2017  8:00 AM IN[facility]ION 180 MIN (180 min.) [facility], [facility]    Arrive at check-in approximately 15 minutes before your scheduled appointment time. Bring all outside medical records and imaging, along with a list of your current medications and insurance card.    [facility], [facility] Floor    5/25/2017  8:00 AM INFUSION 120 MIN (120 min.) [facility], CHEMO    Arrive at check-in approximately 15 minutes before your scheduled appointment time. Bring all outside medical records and  imaging, along with a list of your current medications and insurance card.    [facility], 5th Floor    5/26/2017  8:30 AM INJECTION (15 min.) [facility], [facility]    Arrive at check-in approximately 15 minutes before your scheduled appointment time. Bring all outside medical records and imaging, along with a list of your current medications and insurance card.    [facility], 5th Floor         Default Flowsheet Data (last 24 hours)      Amb Complex Vitals Ross        05/03/17 0813 05/02/17 1731             Measurements    BP (!)  177/77 (!)  165/85       Temp 98 °F (36.7 °C)        Pulse 82 88       Resp 18 18       Pain Assessment    Pain Score Zero Zero               Allergies     No Known Allergies      Medications You Received from 05/02/2017 1150 to 05/03/2017 1150        Date/Time Order Dose Route Action     05/03/2017 1005 etoposide (VEPESID) 100 mg/m2 = 138 mg in sodium chloride 0.9% 500 mL chemo infusion 138 mg Intravenous New Bag     05/03/2017 1149 heparin, porcine (PF) 100 unit/mL injection flush 500 Units 500 Units Intravenous Given     05/03/2017 0839 sodium chloride 0.9% 1,000 mL with magnesium sulfate 1 g, potassium chloride 20 mEq infusion   Intravenous New Bag     05/03/2017 1150 sodium chloride 0.9% flush 10 mL 10 mL Intravenous Given      Current Discharge Medication List     Cannot display discharge medications since this is not an admission.

## 2017-05-05 NOTE — NURSING
Pt. Here for injection of Neulasta. Received in the back of the arm. Tolerated well. No questions at this time.

## 2017-05-22 NOTE — PROGRESS NOTES
Subjective:       Patient ID: Licha Pop is a 62 y.o. female.    Chief Complaint: No chief complaint on file.      HPI     Ms. Pop returns today for follow up.  She is due for her fourth cycle of chemotherapy tomorrow.  Her PET scan fhad shown extensive neck and hilar adenopathy and also two foci in the left iliac bone, hence the initiation of chemotherapy for what appears to be an extensive stage SCLC.    Briefly, she  is a 62-year-old -American female who was recently hospitalized last month with shortness of breath.  Of note, a chest x-ray in early January had showed an opacity in the left upper lobe.  A repeat chest x-ray during her admit showed that the mass had increased in size.  A CT scan of the chest was performed on 02/03/2017 and showed a 3.7 x 3.7 cm mass in the left upper lobe.  There was also a mediastinal soft tissue density mass measuring 5.8 x 7.6 cm.  The patient eventually underwent a biopsy and was subsequently discharged and was given a followup to our clinic.  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    CBc today shows a WBC count of 12,100 /mm3, Hg 11.3 gr/sl, Ht 33.9 % and platelets 266,000 /mm3.  Her creatinine is 0.9 mg/dl and her LFTs are normal.    She has not seen a dentist yet to obtain a clearance for denosumab    Review of Systems      Overall she feels OK. She tolerated the fourth cycle of chemotherapy relatively well.  She again complains of fatigue, feeling cold, and also of poor appetite.  She denies any anxiety, depression, easy bruising, fevers, chills, night  sweats, nausea, vomiting, diarrhea, constipation, diplopia, blurred vision, headache, chest pain, palpitations, breast pain, abdominal pain, extremity pain, or difficulty ambulating.   The remainder of the ten-point ROS, including general, skin, lymph, H/N, cardiorespiratory, GI, , Neuro, Endocrine, and psychiatric is  negative.       Objective:      Physical Exam      She is alert, oriented to time, place, person, pleasant, thin-appearing, in no acute physical distress.    She is accompanied by her son.                               VITAL SIGNS:  Reviewed                                      HEENT:  Alopecia is noted.  There are no nasal, oral, lip, gingival, auricular, lid, or conjunctival lesions.  Mucosae are moist and pink, and there is no thrush.  Pupils are equal, reactive to light and accommodation.              Extraocular muscle movements are intact.  Dentition is fair.  There is no frontal or maxillary tenderness.                                     NECK:  Supple without JVD, adenopathy, or thyromegaly.                       LUNGS:  Clear to auscultation without wheezing, rales, or rhonchi.           CARDIOVASCULAR:  Reveals an S1, S2, no murmurs, no rubs, no gallops.         ABDOMEN:  Soft, slim, nontender, without organomegaly.  Bowel sounds are    present.                                                                     EXTREMITIES:  No cyanosis, clubbing, or edema.                               BREASTS: Deferred                                       LYMPHATIC:  There is no cervical, axillary, or supraclavicular adenopathy.   SKIN:  Warm and moist, without petechiae, rashes, induration, or ecchymoses.  A tattoo is seen on the anterior chest wall.         NEUROLOGIC:  DTRs are 0-1+ bilaterally, symmetrical, motor function is 5/5, and cranial nerves are  within normal limits.        Assessment:       1. Small cell lung cancer, left upper lobe, extensive stage   2. Other emphysema    3.  Diabetes   4. Encounter for chemotherapy     5.    Bone metastases    Plan:        I had a long discussion with her and her son.  She will proceed with her fifth cycle of chemotherapy for the next 3 days, followed by Neulasta.  I will see her in three weeks with repeat labs for cycle 6.  Upon completion of 6 cycles of chemotherapy  she will be referred for radiation therapy.  She was again told to see a dentist so that she can initiate her bone directed therapy.    Her questions were answered to her satisfaction.

## 2017-05-23 NOTE — MR AVS SNAPSHOT
Patient Information     Patient Name  Licha Pop Sex  Female   1955      Visit Information        Provider Department Dept Phone Center    2017 8:00 AM [facility], CHEMO [facility] Chemotherapy Infusion [phone] Rodolfo Barrios      Patient Instructions     None      Your Current Medications Are     acetaminophen (TYLENOL) 500 MG tablet    albuterol (PROVENTIL) 2.5 mg /3 mL (0.083 %) nebulizer solution    ezetimibe (ZETIA) 10 mg tablet    hydrocodone-acetaminophen 10-325mg (NORCO)  mg Tab    ondansetron (ZOFRAN, AS HYDROCHLORIDE,) 4 MG tablet    predniSONE (DELTASONE) 10 MG tablet    valsartan-hydrochlorothiazide (DIOVAN-HCT) 160-12.5 mg per tablet    zolpidem (AMBIEN) 5 MG Tab      Clinic-Administered Medications     cisplatin (PLATINOL) 75 mg/m2 = 104 mg in sodium chloride 0.9% 500 mL chemo infusion    etoposide (VEPESID) 100 mg/m2 = 138 mg in sodium chloride 0.9% 500 mL chemo infusion    fosaprepitant 150 mg in sodium chloride 0.9% 150 mL IVPB    heparin, porcine (PF) 100 unit/mL injection flush 500 Units    palonosetron 0.25mg/dexamethasone 10mg IVPB    sodium chloride 0.9% 1,000 mL with magnesium sulfate 1 g, potassium chloride 20 mEq infusion    sodium chloride 0.9% flush 10 mL      Appointments for Next Year     2017  8:00 AM INFUSION 180 MIN (180 min.) [facility], CHEMO    Arrive at check-in approximately 15 minutes before your scheduled appointment time. Bring all outside medical records and imaging, along with a list of your current medications and insurance card.    [facility], 5th Floor    2017  8:00 AM INFUSION 120 MIN (120 min.) [facility], CHEMO    Arrive at check-in approximately 15 minutes before your scheduled appointment time. Bring all outside medical records and imaging, along with a list of your current medications and insurance card.    [facility], 5th Floor    2017  8:30 AM INJECTION  (15 min.) [facility]   INJECTION, [facility] INFUSION    Arrive at check-in approximately 15 minutes before your scheduled appointment time. Bring all outside medical records and imaging, along with a list of your current medications and insurance card.    [facility], 5th Floor    6/12/2017  7:10 AM NON FASTING LAB (10 min.) [facility]   LAB, HEMONC CANCER BLDG    Arrive at check-in approximately 15 minutes before your scheduled appointment time. Bring all outside medical records and imaging, along with a list of your current medications and insurance card.    [facility] 3rd Floor    6/12/2017  8:15 AM ESTABLISHED PATIENT (30 min.) [place] - Hematology Oncology   Jose Oliva MD    Arrive at check-in approximately 15 minutes before your scheduled appointment time. Bring all outside medical records and imaging, along with a list of your current medications and insurance card.    [facility] - [address] [place] [address]    6/13/2017  8:00 AM INFUSION 360 MIN (360 min.) [facility]   [facility], CHEMO    Arrive at check-in approximately 15 minutes before your scheduled appointment time. Bring all outside medical records and imaging, along with a list of your current medications and insurance card.    [facility], [facility] Floor    6/14/2017  8:00 AM INFUSION 180 MIN (180 min.) [facility]   [facility], CHEMO    Arrive at check-in approximately 15 minutes before your scheduled appointment time. Bring all outside medical records and imaging, along with a list of your current medications and insurance card.    [facility], [facility] Floor    6/15/2017  8:00 AM INFUSION 120 MIN (120 min.) [facility]   [facility], CHEMO    Arrive at check-in approximately 15 minutes before your scheduled appointment time. Bring all outside medical records and imaging, along with a list of your current medications and insurance card.    [place]  "[facility], 5th Floor    6/16/2017  9:00 AM INJECTION (15 min.) [facility]   INJECTION, [facility] INFUSION    Arrive at check-in approximately 15 minutes before your scheduled appointment time. Bring all outside medical records and imaging, along with a list of your current medications and insurance card.    [facility], 5th Floor         Default Flowsheet Data (last 24 hours)      Amb Complex Vitals Ross        05/23/17 0858 05/23/17 0820             Measurements    Weight  42 kg (92 lb 9.6 oz)       Height  5' 6" (1.676 m)       BSA (Calculated - sq m)  1.4 sq meters       BMI (Calculated)  15       BP (!)  144/68 (!)  173/79       Temp  98.3 °F (36.8 °C)       Pulse 80 86       Resp  18       Pain Assessment    Pain Score  Zero               Allergies    No Known Allergies     Medications You Received from 05/22/2017 1300 to 05/23/2017 1300        Date/Time Order Dose Route Action     05/23/2017 1139 cisplatin (PLATINOL) 75 mg/m2 = 104 mg in sodium chloride 0.9% 500 mL chemo infusion 104 mg Intravenous New Bag     05/23/2017 1246 etoposide (VEPESID) 100 mg/m2 = 138 mg in sodium chloride 0.9% 500 mL chemo infusion 138 mg Intravenous New Bag     05/23/2017 1100 palonosetron 0.25mg/dexamethasone 10mg IVPB   Intravenous New Bag     05/23/2017 0854 sodium chloride 0.9% 1,000 mL with magnesium sulfate 1 g, potassium chloride 20 mEq infusion   Intravenous New Bag      Current Discharge Medication List     Cannot display discharge medications since this is not an admission.      "

## 2017-05-23 NOTE — NURSING
Patient refused emend, patient states it causes her to have an upset stomach. Notified Dr. Gayle. Ok to not give emend and proceed with treatment.

## 2017-05-23 NOTE — PLAN OF CARE
Problem: Patient Care Overview  Goal: Plan of Care Review  Outcome: Ongoing (interventions implemented as appropriate)  Patient tolerated treatment well today. Plan to rtc tomorrow for etoposide and post hydration. Port flushed, heparin locked, and deaccessed. Verbalized understanding to call MD for any questions/concerns. AVS given. Discharged home; ambulated independently.

## 2017-05-24 NOTE — NURSING
"1035 notified Dr Garcia of pt's blood pressure is high and if he wanted me to give her any medication for her blood pressure. Also notified of pt not taking her blood pressure medication this am since she was nauseated.  Md reported "I think it is okay to treat since her systolic is not over one hundred and eighty"  Started medication.   "

## 2017-05-25 NOTE — PLAN OF CARE
Problem: Patient Care Overview  Goal: Plan of Care Review  Outcome: Ongoing (interventions implemented as appropriate)  Patient received Etoposide and fluids without any issues. Notified to call MD with any further concerns .  No apparent distress noted. Reminded pt to take home blood pressure meds and take zofran if nauseated at home.

## 2017-05-25 NOTE — PROGRESS NOTES
Notified Nahomy Martin RN that patient did not show up for infusion today and to notify . Acknowledged my message by saying thank you.

## 2017-05-25 NOTE — TELEPHONE ENCOUNTER
Patient called due to No Show for 0800 Chemo appointment. Spoke with patient who reports not being able to come because she does not feel well and has been vomiting since yesterday. Instructed patient that she needs to come in for treatment with IV anti-nausea medicine and IV fluids if needed. Patient states she will get her son to  bring her today at 1pm.

## 2017-05-26 NOTE — PLAN OF CARE
Problem: Patient Care Overview  Goal: Plan of Care Review  Outcome: Ongoing (interventions implemented as appropriate)  Pt. Tolerated treatment well. Discharged without complaints or signs of adverse effects. Pt. aware of Neulasta injection appointment for tomorrow.  Impotance of Neulasta injection stressed; son states he will bring her tomorrow around 8am.

## 2017-05-26 NOTE — PATIENT INSTRUCTIONS
"  How to Control Nausea and Vomiting     Taken before meals, medicines can help ease nausea.    Nausea is feeling that you need to throw up. Throwing up occurs when your body forces food that is in your stomach out through your mouth. Nausea and vomiting are symptoms that are caused by many things. They can happen when a condition or disease, medicine, medical treatment, or a poisonous substance affects the area in your brain that controls vomiting. Some conditions or diseases can cause nausea, abdominal pain or cramps, and vomiting. The symptoms can be mild and go away by themselves. Other symptoms can be serious. You will need to see your healthcare provider for these.  Nausea and vomiting are common. They can be caused by many things. These include:  · "Stomach flu" (gastroenteritis)  · Food poisoning  · Stomach pain (gastritis)  · Blockages  They can also be caused by a head injury, an infection in the brain or inside the ear, or migraines. Other common causes of nausea and vomiting include:  · Brain tumor  · Brain bruise  · Motion sickness  · Drugs. These include alcohol, pain medicines such as morphine, and cancer medicines.  · Toxins. These are poisonous things like plants or liquids that are swallowed by accident.  · Advanced types of cancer  · Movement problems (psychogenic problems)  · Extra pressure in the fluid that surrounds the brain and spinal cord (elevated intracranial pressure)     Nausea and vomiting are also common side effects of chemotherapy and radiation therapy. Side effects happen when treatment changes some normal cells as well as cancer cells. In this case, the cells lining your stomach and the part of your brain that controls vomiting are affected. Other more serious causes of vomiting may be hard to find early in the illness.     When to seek medical advice  Call your healthcare provider right away if you have the following:  · Nausea or vomiting that lasts 24 hours or more  · Trouble " keeping fluids down   Medicines can help  Nausea or vomiting can often be prevented or controlled with medicines (antiemetics). Your doctor may give you antiemetics before or after treatment if you are getting chemotherapy or other medical treatments that cause nausea or vomiting.  Eating tips  · If you have medicines to control nausea, take them before meals as directed.  · Avoid fatty or greasy foods while nauseated.  · Eat small meals slowly throughout the day.  · Ask someone to sit with you while you eat to keep you from thinking about feeling nauseated.  · Eat foods at room temperature or colder to avoid strong smells.  · Eat dry foods, such as toast, crackers, or pretzels. Also eat cool, light foods, such as applesauce, and bland foods, such as oatmeal or skinned chicken.   Other ways to feel better  · Get a little fresh air. Take a short walk.  · Talk to a friend, listen to music, or watch TV.  · Take a few deep, slow breaths.  · Eat by candlelight or in surroundings that you find relaxing.  · Use a technique, such as guided imagery, to help you relax. Imagine yourself in a beautiful, restful scene. Or daydream about the place youd most like to be.  Date Last Reviewed: 1/6/2016  © 2081-7562 [employer]. [address] [place] [address], [place] [address]. All rights reserved. This information is not intended as a substitute for professional medical care. Always follow your healthcare professional's instructions.

## 2017-06-13 NOTE — PROGRESS NOTES
Subjective:       Patient ID: Licha Pop is a 62 y.o. female.    Chief Complaint: No chief complaint on file.      HPI     Ms. Pop returns today for follow up.  She is due for her sixth cycle of chemotherapy today.  Her PET scan fhad shown extensive neck and hilar adenopathy and also two foci in the left iliac bone, hence the initiation of chemotherapy for what appears to be an extensive stage SCLC.    Briefly, she  is a 62-year-old -American female who was recently hospitalized last month with shortness of breath.  Of note, a chest x-ray in early January had showed an opacity in the left upper lobe.  A repeat chest x-ray during her admit showed that the mass had increased in size.  A CT scan of the chest was performed on 02/03/2017 and showed a 3.7 x 3.7 cm mass in the left upper lobe.  There was also a mediastinal soft tissue density mass measuring 5.8 x 7.6 cm.  The patient eventually underwent a biopsy and was subsequently discharged and was given a followup to our clinic.  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    CBc today shows a WBC count of 9,800 /mm3, Hg 11.9 gr/sl, Ht 38.0 % and platelets 241,000 /mm3.  Her creatinine is 1.1 mg/dl and her LFTs are normal.    She has not seen a dentist yet to obtain a clearance for denosumab    Review of Systems      Overall she feels OK. She tolerated the fifth cycle of chemotherapy relatively well.  She again complains of fatigue and of feeling cold.  She denies any anxiety, depression, easy bruising, fevers, chills, night  sweats, nausea, vomiting, diarrhea, constipation, diplopia, blurred vision, headache, chest pain, palpitations, breast pain, abdominal pain, extremity pain, or difficulty ambulating.   The remainder of the ten-point ROS, including general, skin, lymph, H/N, cardiorespiratory, GI, , Neuro, Endocrine, and psychiatric is negative.       Objective:       Physical Exam      She is alert, oriented to time, place, person, pleasant, thin-appearing, in no acute physical distress.    She is accompanied by her son.                               VITAL SIGNS:  Reviewed                                      HEENT:  Alopecia is noted.  There are no nasal, oral, lip, gingival, auricular, lid, or conjunctival lesions.  Mucosae are moist and pink, and there is no thrush.  Pupils are equal, reactive to light and accommodation.              Extraocular muscle movements are intact.  Dentition is fair.  There is no frontal or maxillary tenderness.                                     NECK:  Supple without JVD, adenopathy, or thyromegaly.                       LUNGS:  Clear to auscultation without wheezing, rales, or rhonchi.           CARDIOVASCULAR:  Reveals an S1, S2, no murmurs, no rubs, no gallops.         ABDOMEN:  Soft, slim, nontender, without organomegaly.  Bowel sounds are    present.                                                                     EXTREMITIES:  No cyanosis, clubbing, or edema.                               BREASTS: Deferred                                       LYMPHATIC:  There is no cervical, axillary, or supraclavicular adenopathy.   SKIN:  Warm and moist, without petechiae, rashes, induration, or ecchymoses.  A tattoo is seen on the anterior chest wall.         NEUROLOGIC:  DTRs are 0-1+ bilaterally, symmetrical, motor function is 5/5, and cranial nerves are  within normal limits.        Assessment:       1. Small cell lung cancer, left upper lobe, extensive stage   2. Other emphysema    3.  Diabetes   4. Encounter for chemotherapy     5.    Bone metastases    Plan:        I had a long discussion with her and her son.  She will proceed with her final cycle of chemotherapy for the next 3 days, followed by Neulasta.  I will see her in three weeks with repeat labs.  In addition, she will have scans and will be referred for radiation therapy.  She was  again told to see a dentist so that she can initiate her bone directed therapy.    Her questions were answered to her satisfaction.

## 2017-06-13 NOTE — NURSING
"Pt refused emmend & aloxi/dex. States she gets abdominal pain & "nausea medication causes vomiting". Instructed on use & purpose of emmend & aloxi & how cisplatin is highly emetogenic but pt refused. Insisted N/V was caused by nausea meds. Reviewed the medications she received & didn't receive last chemo & post effects but continues to refuse. Notified Dr Gutierrez & requested zofran. No new orders. Pt has zofran at home that she will take as needed.  "

## 2017-06-13 NOTE — PLAN OF CARE
Problem: Patient Care Overview  Goal: Plan of Care Review  Outcome: Ongoing (interventions implemented as appropriate)  Pt tolerated tcisplatin/etoposide without adverse effects. VSS. Provided AVS & verbalized understanding of RTC date. DC with family ambulating independently.

## 2017-06-14 NOTE — PLAN OF CARE
Problem: Patient Care Overview  Goal: Plan of Care Review  Outcome: Ongoing (interventions implemented as appropriate)  Patient received Etoposide and fluids   without any issues. Notified to call MD with any further concerns . Vss. No apparent distress noted.

## 2017-06-14 NOTE — NURSING
2732-- Dr Morrison notified of pt's blood pressure is still high. He reported to give Clonidine 0.1mg po every hours till systolic is below 170

## 2017-06-14 NOTE — NURSING
1147-- received to give pt another Clonidine 0.1 then restart fluids after systolic is less then 170.

## 2017-06-15 NOTE — PLAN OF CARE
Problem: Patient Care Overview  Goal: Plan of Care Review  Outcome: Ongoing (interventions implemented as appropriate)  Pt tolerated etoposide with no complications. Pt instructed to call MD with any problems and RTC tomorrow for Neulasta injection. AVS given to patient and patient discharged home.

## 2017-06-17 NOTE — NURSING
0920- Patient arrived ambulatory to the unit for injection. Assessment completed and no new issues identified following yesterdays treatment.  Patient tolerated injection, was given AVS upon leaving and was discharged to home setting. Patient verbalized will contact MD for further questions or issues.

## 2017-11-26 PROBLEM — N17.9 AKI (ACUTE KIDNEY INJURY): Status: ACTIVE | Noted: 2017-01-01

## 2017-11-26 PROBLEM — C79.31 BRAIN METASTASIS: Status: ACTIVE | Noted: 2017-01-01

## 2017-11-26 PROBLEM — G93.6 VASOGENIC CEREBRAL EDEMA: Status: ACTIVE | Noted: 2017-01-01

## 2017-11-26 PROBLEM — E86.1 HYPOVOLEMIA: Status: ACTIVE | Noted: 2017-01-01

## 2017-11-26 PROBLEM — G93.9 PONTINE LESION: Status: ACTIVE | Noted: 2017-01-01

## 2017-11-26 PROBLEM — G93.5 BRAIN COMPRESSION: Status: ACTIVE | Noted: 2017-01-01

## 2017-11-26 NOTE — H&P
History & Physical  Neurocritical Care    Admit Date: 11/25/2017  LOS: 0    Code Status: Full Code     CC: Pontine lesion    SUBJECTIVE:     History of Present Illness:     The patient is a 62 y.o. female with hx of: HTN, DM, COPD on home O2 and lung cancer + mets who presented to the ED with a complaint of numbness in the left face, arm, and leg for the last month a/w inability to hold anything in her left hand, SOB, and mild coughing. She stated she had some L-sided weakness but denies speech difficulties, facial droop, fever, or chills. Head CT showed a pontine hemorrhage v hemorrhagic met.     She recently finished a round of chemotherapy for lung cancer and her oncologist is at [facility]. She is on 2L home O2.        Past Medical History:   Diagnosis Date    COPD (chronic obstructive pulmonary disease)     noted in MD h/p pt not aware of this diagnosis    Diabetes mellitus     Hypertension     On home oxygen therapy     since jan 2017     No past surgical history on file.  Current Facility-Administered Medications on File Prior to Encounter   Medication Dose Route Frequency Provider Last Rate Last Dose    sodium chloride 0.9% 1,000 mL with magnesium sulfate 1 g, potassium chloride 20 mEq infusion   Intravenous Once Jose MD Hazel         Current Outpatient Prescriptions on File Prior to Encounter   Medication Sig Dispense Refill    ezetimibe (ZETIA) 10 mg tablet Take 10 mg by mouth once daily.      hydrocodone-acetaminophen 10-325mg (NORCO)  mg Tab Take 1 tablet by mouth every 8 (eight) hours as needed for Pain. 30 tablet 0    valsartan-hydrochlorothiazide (DIOVAN-HCT) 160-12.5 mg per tablet Take 1 tablet by mouth once daily.      acetaminophen (TYLENOL) 500 MG tablet Take 500 mg by mouth nightly as needed for Pain.      albuterol (PROVENTIL) 2.5 mg /3 mL (0.083 %) nebulizer solution Take 2.5 mg by nebulization 3 (three) times daily. Rescue      ondansetron (ZOFRAN, AS HYDROCHLORIDE,) 4 MG  tablet Take 1 tablet (4 mg total) by mouth every 4 (four) hours as needed for Nausea. 40 tablet 4    predniSONE (DELTASONE) 10 MG tablet Take 10 mg by mouth once daily.      zolpidem (AMBIEN) 5 MG Tab Take 1 tablet (5 mg total) by mouth nightly as needed. 30 tablet 1     Review of patient's allergies indicates:  No Known Allergies  No family history on file.  Social History   Substance Use Topics    Smoking status: Former Smoker     Start date: 2/3/2017    Smokeless tobacco: Never Used    Alcohol use No      Review of Symptoms:  Constitutional: Denies fevers. +weight loss and weakness.  Eyes: Denies changes in vision.  ENT: Denies dysphagia, nasal discharge, ear pain or discharge.  Cardiovascular: Denies chest pain, palpitations, orthopnea, or claudication.  Respiratory: + shortness of breath, cough; denies hemoptysis or wheezing.  GI: Denies nausea/vomitting, hematochezia, melena, abd pain, or changes in appetite.  : Denies dysuria, incontinence, or hematuria.  Musculoskeletal: + back pain.  Skin/breast: Denies rashes, lumps, lesions, or discharge.  Neurologic: Denies headache, dizziness, vertigo; + paresthesias to L-side.  Psychiatric: Denies changes in mood or hallucinations.  Endocrine: Denies polyuria, polydipsia, heat/cold intolerance.  Hematologic/Lymph: Denies lymphadenopathy, easy bruising or easy bleeding.  Allergic/Immunologic: Denies rash, rhinitis.     OBJECTIVE:   Vital Signs (Most Recent):   Temp: 98 °F (36.7 °C) (11/25/17 2147)  Pulse: 87 (11/26/17 0118)  Resp: 18 (11/25/17 1940)  BP: (!) 156/80 (11/26/17 0118)  SpO2: 95 % (11/26/17 0118)    Vital Signs (24h Range):   Temp:  [97.3 °F (36.3 °C)-98 °F (36.7 °C)] 98 °F (36.7 °C)  Pulse:  [85-98] 87  Resp:  [18-24] 18  SpO2:  [90 %-100 %] 95 %  BP: (150-202)/() 156/80    I & O (Last 24h):  No intake or output data in the 24 hours ending 11/26/17 0133  Physical Exam:  GA: Alert, comfortable, no acute distress.   HEENT: No scleral icterus or  JVD.   Pulmonary: Diminished BL, no wheezes appreciated.  Cardiac: RRR S1 & S2 w/o rubs/murmurs/gallops.   Abdominal: Bowel sounds present x 4. No appreciable hepatosplenomegaly.  Skin: No jaundice, rashes, or visible lesions.  Neuro:  --GCS: E4 V5 M6  --Mental Status:  AAOx3  --CN II-XII grossly intact.   --Pupils 3mm, PERRL.   --Corneal reflex, gag, cough intact.  --LUE strength: 4/5  --RUE strength: 5/5  --LLE strength: 4/5  --RLE strength: 5/5  --L-side paresthesias    Lines/Drains/Airway:          Nutrition/Tube Feeds:   Current Diet Order   Procedures    Diet NPO       Labs:  ABG: No results for input(s): PH, PO2, PCO2, HCO3, POCSATURATED, BE in the last 24 hours.  BMP:    Recent Labs  Lab 11/25/17  1902      K 3.9      CO2 30*   BUN 22*   CREATININE 1.7*   GLU 89   MG 1.9   PHOS 4.3     LFT:   Lab Results   Component Value Date    AST 15 11/25/2017    ALT 8 (L) 11/25/2017    ALKPHOS 54 (L) 11/25/2017    BILITOT 0.3 11/25/2017    ALBUMIN 3.5 11/25/2017    PROT 7.9 11/25/2017     CBC:   Lab Results   Component Value Date    WBC 6.61 11/25/2017    HGB 12.6 11/25/2017    HCT 39.4 11/25/2017    MCV 88 11/25/2017     11/25/2017     Microbiology x 7d:   Microbiology Results (last 7 days)     Procedure Component Value Units Date/Time    Urine culture [[id number]] Collected:  11/25/17 2127    Order Status:  No result Specimen:  Urine from Clean Catch Updated:  11/25/17 2127    Urine culture [[id number]]     Order Status:  Completed Specimen:  Urine from Urine, Clean Catch         Imaging:  CTH:   attenuating focus within the right hemipons, extending into the right cerebral peduncle, concerning for hemorrhage, possibly hemorrhagic infarct or high attenuating/hemorrhagic metastatic lesion, this is new since the previous examination, correlation advised.      I personally reviewed the above image.    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     Brain metastasis 11/26/2017    Thrombocytopenia due to drugs 03/01/2017    Encounter for antineoplastic chemotherapy 02/22/2017    Bone metastases 02/22/2017    Small cell lung cancer 02/15/2017    COPD (chronic obstructive pulmonary disease) 02/15/2017    Shortness of breath     Pulmonary fibrosis 02/04/2017    Headache 02/04/2017    Mass of left lung 02/03/2017     Neuro:   - CTH shows attenuating focus within the right hemipons, extending into the right cerebral peduncle, concerning for hemorrhage, possibly hemorrhagic infarct or high attenuating/hemorrhagic metastatic lesion, this is new since the previous examination, correlation advised.  - admit to NCC    - NSGY following, appreciate recs  - SBP<150  - MRI w/w/o  - q1 neurochecks    Pulmonary:   - h/o lung CA, former smoker  - on home O2, cont  - oxygenating well    Cardiac:   - h/o HTN, states controlled on home meds  - SBP<150  - resume home meds  - echo ordered    Renal:    - BUN/Cr elevated above baseline (1.7 v 1)  - monitor I/Os  - IVF    ID:   - no s/s of infection  - no acute issues    Hem/Onc:   - h/o lung CA w/ mets; per PET: Metastatic disease to the left neck, left supraclavicular region, left thoracic inlet, left lung, left anterior mediastinum, precarinal, and left hilar regions 2 separate bone metastases  - per CXR, lung lesion increasing in size  - Consider Heme/Onc consult in the AM  - possible hemorrhagic metastatic lesion  - will cont to monitor    Endocrine:    - h/o DM2  - LDSSI  - A1c pending    Fluids/Electrolytes/Nutrition/GI:   - IVF  - replete lytes prn  - NPO till swallow eval    Dispo: admit to NCC, MRI    Cristian Denis MD

## 2017-11-26 NOTE — ED PROVIDER NOTES
"Encounter Date: 11/25/2017    SCRIBE #1 NOTE: I, Tiffanie Tillman, am scribing for, and in the presence of,  Dr. Rose. I have scribed the entire note.       History     Chief Complaint   Patient presents with    Shortness of Breath     " I have been short of the breath with arm adn leg pains for about a month now. I been out my inhaler so I can't have that to help me. I am usually on two liters of oxygen at home but both of my oxygen tanks have been out for a little while now". + increased SOB and WOM upon exertion reported. Denies chest pains, dizziness, blurred vision.     Time patient was seen by the provider: 10:54 PM      The patient is a 62 y.o. female with hx of: HTN, DM, COPD, on home O2, that presents to the ED with a complaint of numbness in the left face, arm, and leg for the last month, with associated inability to hold anything in her left hand, SOB, and mild coughing. She also endorses having a cold. She denies weakness, speech difficulties, facial droop, fever, or chills. She was seen at [facility] earlier today. She recently finished a round of chemotherapy for lung cancer and her oncologist is at [facility]. She is on 2L home O2.         The history is provided by the patient.     Review of patient's allergies indicates:  No Known Allergies  Past Medical History:   Diagnosis Date    COPD (chronic obstructive pulmonary disease)     noted in MD h/p pt not aware of this diagnosis    Diabetes mellitus     Hypertension     On home oxygen therapy     since jan 2017     No past surgical history on file.  No family history on file.  Social History   Substance Use Topics    Smoking status: Former Smoker     Start date: 2/3/2017    Smokeless tobacco: Never Used    Alcohol use No     Review of Systems   Constitutional: Negative for chills and fever.   HENT: Negative for sore throat.    Respiratory: Positive for cough (mild) and shortness of breath.    Cardiovascular: Negative for chest pain. "   Gastrointestinal: Negative for nausea.   Genitourinary: Negative for dysuria.   Musculoskeletal: Negative for back pain.   Skin: Negative for rash.   Neurological: Positive for numbness (left face, arm, and leg). Negative for speech difficulty and weakness.        Positive for inability to hold anything in her left hand. Negative for facial droop.   Hematological: Does not bruise/bleed easily.       Physical Exam     Initial Vitals [11/25/17 1751]   BP Pulse Resp Temp SpO2   (!) 202/87 94 (!) 24 97.3 °F (36.3 °C) (!) 90 %      MAP       125.33         Physical Exam    Nursing note and vitals reviewed.  Constitutional:   Cachectic.    HENT:   Head: Normocephalic and atraumatic.   Mouth/Throat: Oropharynx is clear and moist.   Eyes: Pupils are equal, round, and reactive to light.   Neck: Normal range of motion. Neck supple. No JVD present.   Cardiovascular: Normal rate, regular rhythm, normal heart sounds and intact distal pulses.   Pulmonary/Chest: Breath sounds normal. No respiratory distress. She has no wheezes. She has no rhonchi. She has no rales.   Abdominal: Soft. She exhibits no distension. There is no tenderness.   Musculoskeletal: Normal range of motion. She exhibits no edema.   Lymphadenopathy:     She has no cervical adenopathy.   Neurological: She is alert and oriented to person, place, and time. She has normal strength. No cranial nerve deficit.   Decreased sensation to left face and left arm. No pronator drift.    Skin: Skin is warm and dry.         ED Course   Procedures  Labs Reviewed   CBC W/ AUTO DIFFERENTIAL - Abnormal; Notable for the following:        Result Value    RDW 15.5 (*)     All other components within normal limits   COMPREHENSIVE METABOLIC PANEL - Abnormal; Notable for the following:     CO2 30 (*)     BUN, Bld 22 (*)     Creatinine 1.7 (*)     Alkaline Phosphatase 54 (*)     ALT 8 (*)     eGFR if  37 (*)     eGFR if non  32 (*)     All other  components within normal limits   URINALYSIS - Abnormal; Notable for the following:     Occult Blood UA Trace (*)     Leukocytes, UA Trace (*)     All other components within normal limits   URINALYSIS MICROSCOPIC - Abnormal; Notable for the following:     WBC, UA 12 (*)     WBC Clumps, UA Occasional (*)     Bacteria, UA Few (*)     All other components within normal limits   CULTURE, URINE   CULTURE, URINE   B-TYPE NATRIURETIC PEPTIDE   MAGNESIUM   PHOSPHORUS             Medical Decision Making:   History:   Old Medical Records: I decided to obtain old medical records.  Initial Assessment:   Emergent evaluation of 62 y.o. female transferred from [facility] for emergent neurosurgical evaluation. Patient has 1 month history of left facial, arm, and leg paraesthesias. Denies slurred speech or weakness. CT head shows hemorrhagic mass with slight mass effect. Neuro consulted, case discussed, and they will evaluate patient.    1:02 AM  Neurosurgery evaluated the patient. They will admit to neuro critical care.  Clinical Tests:   Lab Tests: Reviewed  Radiological Study: Reviewed  Other:   I have discussed this case with another health care provider.            Scribe Attestation:   Scribe #1: I performed the above scribed service and the documentation accurately describes the services I performed. I attest to the accuracy of the note.            ED Course      Clinical Impression:   The primary encounter diagnosis was Brain metastasis. Diagnoses of Other nontraumatic intracerebral hemorrhage, unspecified laterality, Primary malignant neoplasm of left lung metastatic to other site, Left sided numbness, and Urinary tract infection without hematuria, site unspecified were also pertinent to this visit.    Disposition:   Disposition: Admitted                        Radha Rose MD  02/16/18 1700

## 2017-11-26 NOTE — HOSPITAL COURSE
11/25: transferred to [facility]  11/26: pt evaluated by neurosurgery  11/27: pt evaluated by Dr. Galvin

## 2017-11-26 NOTE — CONSULTS
[facility]  Hematology/Oncology  Consult Note    Patient Name: Licha Pop  MRN: [id number]  Admission Date: 11/25/2017  Hospital Length of Stay: 0 days  Code Status: Full Code   Attending Provider: Kd Barrientos MD  Consulting Provider: Mil Dunn MD  Primary Care Physician: Jayden John MD  Principal Problem:Pontine lesion    Consults  Subjective:     HPI: 62 y.o. female with PMHx of COPD on home O2 and Extensive Stage SCLC diagnosed in Feb, 2017 who presented to the ED with a complaint of numbness in the left face, arm, and leg for the last month a/w inability to hold anything in her left hand, SOB, and mild coughing. She stated she had some L-sided weakness but denies speech difficulties, facial droop, fever, or chills. Head CT on admission showed attenuating focus within the right hemipons, extending into the right cerebral peduncle, concerning for hemorrhage, possibly hemorrhagic infarct or high attenuating/hemorrhagic metastatic lesion, this is new since the previous examination. She was admitted in the NICU given concern of decompensation from neurological standpoint. MRI of the Brain showed a 1.7 x 1.8 cm right pontine lesion with additional 1.7 x 0.8 cm right cerebellar lesion. These are concerning for metastases, with gradient susceptibility suggesting hemorrhage in the right cerebellar lesion. She had a CXR on admission which showed enlarging left hilar/left upper lobe mass, increased in size since prior study, suggesting interval progression of disease in the chest. Medical Oncology consulted to discuss further management regarding new brain lesions.     Oncology History.  - diagnosed in Feb, 2017 with Extensive Stage SCLC, primary oncologist - Dr. oJse Oliva  - she received 6 cycles of Cisplatin/Etoposide from Feb - June 2017.   - PET/CT in July, 2017 showed Favorable response to therapy with resolution of almost the totality of the hypermetabolic  lesions. An index lesion in the left anterior mediastinum now demonstrates an SUV max of 1.5 compared to 14.8 previously. A soft tissue nodule is reidentified in the apical posterior segment of the left upper lobe, measuring smaller in size and demonstrating an SUV max of 1.5 compared to 13.4 previously. An additional index lesion within the precarinal region demonstrates an SUV max of 2.1 compared to 12.7 previously.  - She was schedule to see Dr. Flores for PCI on 7/10/17, but was no show.     Medications:  Continuous Infusions:   sodium chloride 0.9% 75 mL/hr at 11/26/17 1300     Scheduled Meds:   losartan-hydrochlorothiazide 100-12.5 mg  1 tablet Oral Daily    predniSONE  10 mg Oral Daily     PRN Meds:albuterol-ipratropium 2.5mg-0.5mg/3mL, dextrose 50%, glucagon (human recombinant), hydrALAZINE, insulin aspart, ondansetron     Review of patient's allergies indicates:  No Known Allergies     Past Medical History:   Diagnosis Date    COPD (chronic obstructive pulmonary disease)     noted in MD h/p pt not aware of this diagnosis    Diabetes mellitus     Hypertension     On home oxygen therapy     since jan 2017     History reviewed. No pertinent surgical history.  Family History     None        Social History Main Topics    Smoking status: Former Smoker     Start date: 2/3/2017    Smokeless tobacco: Never Used    Alcohol use No    Drug use: No    Sexual activity: Not on file       Review of Systems   Constitutional: weight loss and weakness.  Eyes: Denies changes in vision.  ENT: Denies dysphagia, nasal discharge, ear pain or discharge.  Cardiovascular: Denies chest pain, palpitations, orthopnea, or claudication.  Respiratory: shortness of breath, cough; denies hemoptysis or wheezing.  GI: Denies nausea/vomitting, hematochezia, melena, abd pain, or changes in appetite.  : Denies dysuria, incontinence, or hematuria.  Musculoskeletal: back pain.  Skin/breast: Denies rashes, lumps, lesions, or  discharge.  Neurologic: weakness on the L-side.  Hematologic/Lymph: Denies lymphadenopathy, easy bruising or easy bleeding.       Objective:     Vital Signs (Most Recent):  Temp: 98.3 °F (36.8 °C) (11/26/17 1100)  Pulse: 85 (11/26/17 1300)  Resp: (!) 35 (11/26/17 1300)  BP: 112/70 (11/26/17 1300)  SpO2: 99 % (11/26/17 1300) Vital Signs (24h Range):  Temp:  [97.3 °F (36.3 °C)-98.3 °F (36.8 °C)] 98.3 °F (36.8 °C)  Pulse:  [76-98] 85  Resp:  [18-35] 35  SpO2:  [90 %-100 %] 99 %  BP: (112-202)/() 112/70     Weight: 49.5 kg (109 lb 2 oz)  Body mass index is 18.73 kg/m².  Body surface area is 1.5 meters squared.      Intake/Output Summary (Last 24 hours) at 11/26/17 1448  Last data filed at 11/26/17 1300   Gross per 24 hour   Intake          1306.25 ml   Output             1450 ml   Net          -143.75 ml       Physical Exam  Constitutional: Cachectic.    HENT:   Head: Normocephalic and atraumatic.   Mouth/Throat: Oropharynx is clear and moist.   Eyes: Pupils are equal, round, and reactive to light.   Neck: Normal range of motion. Neck supple. No JVD present.   Cardiovascular: Normal rate, regular rhythm, normal heart sounds and intact distal pulses.   Pulmonary/Chest: Breath sounds normal. No respiratory distress. She has no wheezes. She has no rhonchi. Abdominal: Soft. She exhibits no distension. There is no tenderness.   Musculoskeletal: Normal range of motion. She exhibits no edema.   Lymphadenopathy:     She has no cervical adenopathy.   Neurological: She is alert and oriented to person, place, and time. She has normal strength. No cranial nerve deficit. Decreased sensation to left face and left arm.   Skin: Skin is warm and dry.     Significant Labs:   CBC:   Recent Labs  Lab 11/25/17  1902 11/26/17  0239   WBC 6.61 7.59   HGB 12.6 10.6*   HCT 39.4 33.5*    165    and CMP:   Recent Labs  Lab 11/25/17  1902 11/26/17  0239    142   K 3.9 4.0    106   CO2 30* 25   GLU 89 105   BUN 22* 26*  "  CREATININE 1.7* 1.4   CALCIUM 9.8 9.6   PROT 7.9 7.2   ALBUMIN 3.5 3.4*   BILITOT 0.3 0.3   ALKPHOS 54* 51*   AST 15 13   ALT 8* 5*   ANIONGAP 9 11   EGFRNONAA 32* 40.9*       Diagnostic Results:    CT Head: 11/25/2017    attenuating focus within the right hemipons, extending into the right cerebral peduncle, concerning for hemorrhage, possibly hemorrhagic infarct or high attenuating/hemorrhagic metastatic lesion, this is new since the previous examination, correlation advised.  Neurosurgical consultation recommended. Sinus disease.     MRI of Brain 11/25/17:    1.7 x 1.8 cm right pontine lesion with additional 1.7 x 0.8 cm right cerebellar lesion. These are concerning for metastases, with gradient susceptibility suggesting hemorrhage in the right cerebellar lesion.      Assessment/Plan:     Active Diagnoses:    Diagnosis Date Noted POA    PRINCIPAL PROBLEM:  Pontine lesion [G93.9] 11/26/2017 Yes    Brain metastasis [C79.31] 11/26/2017 Yes    Brain compression [G93.5] 11/26/2017 Yes    Vasogenic cerebral edema [G93.6] 11/26/2017 Yes    Hypovolemia [E86.1] 11/26/2017 Yes    FRANCA (acute kidney injury) [N17.9] 11/26/2017 Yes    Small cell lung cancer [C34.90] 02/15/2017 Yes    COPD (chronic obstructive pulmonary disease) [J44.9] 02/15/2017 Yes    Shortness of breath [R06.02]  Yes    Pulmonary fibrosis [J84.10] 02/04/2017 Yes    Headache [R51] 02/04/2017 Yes      Problems Resolved During this Admission:    Diagnosis Date Noted Date Resolved POA     Multiple Brain Metastases   Extensive Stage Small Cell Lung Cancer     - patient with history of extensive stage SCLC who completed induction chemotherapy in July, followed by PET/CT which showed response to therapy.   - she was scheduled to undergo PCI with Dr. Flores in Radiation Oncology, but appears she was a "no show" and lost to follow up in the clinic.   - given these cerebral metastases very likely this is metastatic disease from SCLC.   - seen by " Neurosurgery and patient's case is being discussed with Dr. Galvin for possible gammaknife.   - consult radiation oncology.   - recommend obtaining CT Chest/Abdomen/Pelvis to assess her systemic disease. Last PET/CT was done on 7/6/17. CXR on admission clearly shows progression of disease in the chest.   - she will need a follow up with her primary oncologist, Dr. Oliva in the Medical Oncology clinic to discuss palliative systemic therapy. Given she relapsed within 6 months, would recommend immunotherapy, but this can be addressed based on her clinical/performance status after treating her brain metastasis.     Case discussed with Dr. Vogel.    Thank you for your consult. I will follow-up with patient. Please contact us if you have any additional questions.    Mil Dunn MD  Hematology/Oncology  [facility]

## 2017-11-26 NOTE — ASSESSMENT & PLAN NOTE
R pontine and R cerebellar peduncle lesion, possibly hemorrhagic, mild left sided numbness and weakness    -admit to neuro ICU  -MRI w and wo for better characterization  -SBP <150  -q1h neuro checks  -HOB30  -further recs following MRI  -discussed with Dr Plascencia

## 2017-11-26 NOTE — PROGRESS NOTES
Pt arrived to room 7069 via stretcher from ED. Pt oriented to room, bed wheels locked and alarm set. VSS stable. NCC notified of pt's arrival. No new orders. Will continue to monitor.

## 2017-11-26 NOTE — CONSULTS
[facility]  Neurosurgery  Consult Note    Inpatient consult to Neurosurgery  Consult performed by: KANCHAN VILLARREAL  Consult ordered by: ALFONSO MARTINEZ        Subjective:     Chief Complaint/Reason for Admission: left sided numbness    History of Present Illness: Ms Pop is a 62 year old female with history of lung cancer diagnosed in January and s/p chemo that ended in July that presents to the ED after 1 month of worsening L sided numbness and parasthesias along with some mild weakness. Denies changes in vision, denies nausea/vomiting. CT [facility] showed R side of nehemias and R cerebellar peduncle. Denies anticoagulation/antiplatelets      (Not in a hospital admission)    Review of patient's allergies indicates:  No Known Allergies    Past Medical History:   Diagnosis Date    COPD (chronic obstructive pulmonary disease)     noted in MD h/p pt not aware of this diagnosis    Diabetes mellitus     Hypertension     On home oxygen therapy     since jan 2017     No past surgical history on file.  Family History     None        Social History Main Topics    Smoking status: Former Smoker     Start date: 2/3/2017    Smokeless tobacco: Never Used    Alcohol use No    Drug use: No    Sexual activity: Not on file     Review of Systems   Constitutional: Negative for activity change and appetite change.   HENT: Negative for facial swelling and hearing loss.    Eyes: Negative for photophobia, discharge and visual disturbance.   Respiratory: Positive for shortness of breath. Negative for apnea and chest tightness.    Cardiovascular: Negative for chest pain.   Gastrointestinal: Negative for abdominal distention and abdominal pain.   Endocrine: Negative for cold intolerance and heat intolerance.   Genitourinary: Negative for difficulty urinating and dyspareunia.   Musculoskeletal: Negative for arthralgias and back pain.   Skin: Negative for color change.   Neurological:  Positive for light-headedness and numbness. Negative for dizziness, seizures, facial asymmetry, speech difficulty and headaches.     Objective:     Weight: 60.8 kg (134 lb)  Body mass index is 23 kg/m².  Vital Signs (Most Recent):  Temp: 98 °F (36.7 °C) (11/25/17 2147)  Pulse: 87 (11/26/17 0118)  Resp: 18 (11/25/17 1940)  BP: (!) 156/80 (11/26/17 0118)  SpO2: 95 % (11/26/17 0118) Vital Signs (24h Range):  Temp:  [97.3 °F (36.3 °C)-98 °F (36.7 °C)] 98 °F (36.7 °C)  Pulse:  [85-98] 87  Resp:  [18-24] 18  SpO2:  [90 %-100 %] 95 %  BP: (150-202)/() 156/80                           Neurosurgery Physical Exam   -Alert and oriented x4  -PERRL, EOMI, face symmetrical, tongue midline, facial sensation decreased on the left to light touch, shoulder shrug full strength and symmetric  -Motor: 4+/5 throughout the LUE/LLE, 5/5 throughout the RUE/RLE  -sensation decreased to light touch in LLE/LUE  -reflexes 2+ in patella and brachioradialis bilaterally  -no clonus, no Adams's      Significant Labs:    Recent Labs  Lab 11/25/17 1902   GLU 89      K 3.9      CO2 30*   BUN 22*   CREATININE 1.7*   CALCIUM 9.8   MG 1.9       Recent Labs  Lab 11/25/17  1902   WBC 6.61   HGB 12.6   HCT 39.4        No results for input(s): LABPT, INR, APTT in the last 48 hours.  Microbiology Results (last 7 days)     Procedure Component Value Units Date/Time    Urine culture [[id number]] Collected:  11/25/17 2127    Order Status:  No result Specimen:  Urine from Clean Catch Updated:  11/25/17 2127    Urine culture [[id number]]     Order Status:  Completed Specimen:  Urine from Urine, Clean Catch         All pertinent labs from the last 24 hours have been reviewed.    Significant Diagnostics:  CT: Ct Head Without Contrast    Result Date: 11/25/2017  I attenuating focus within the right hemipons, extending into the right cerebral peduncle, concerning for hemorrhage, possibly hemorrhagic infarct or high attenuating/hemorrhagic  metastatic lesion, this is new since the previous examination, correlation advised.  Neurosurgical consultation recommended. Sinus disease. Electronically signed by: EMMANUEL LEE MD Date:     11/25/17 Time:    19:26     Assessment/Plan:     * Pontine lesion    R pontine and R cerebellar peduncle lesion, possibly hemorrhagic, mild left sided numbness and weakness    -admit to neuro ICU  -MRI w and wo for better characterization  -SBP <150  -q1h neuro checks  -HOB30  -further recs following MRI  -discussed with Dr Plascencia            Thank you for your consult. I will follow-up with patient. Please contact us if you have any additional questions.    Branden Huerta MD  Neurosurgery  [facility]

## 2017-11-26 NOTE — ED PROVIDER NOTES
"Encounter Date: 11/25/2017    SCRIBE #1 NOTE: I, Laura Kurt, am scribing for, and in the presence of, Dr. Galvin.       History     Chief Complaint   Patient presents with    Shortness of Breath     " I have been short of the breath with arm adn leg pains for about a month now. I been out my inhaler so I can't have that to help me. I am usually on two liters of oxygen at home but both of my oxygen tanks have been out for a little while now". + increased SOB and WOM upon exertion reported. Denies chest pains, dizziness, blurred vision.     Time seen by provider: 6:23 PM    This is a 62 y.o. female with HTN, DM, COPD, and lung cancer who presents with complaint of "constant numbness and tingling for about a month". Numbness and tingling are located on the entire L side of her body, including the face, arm, leg, and foot. She states that she has stood and "almost fallen" due to decreased sensation to the L side of her body. She also c/o SOB with exertion and intermittent L sided CP for the past few weeks.  Chest pain lasts approximately 1-2 seconds and resolves without intervention.  Pt is on home oxygen, but is currently out of her portable oxygen. She typically takes 2 L. She also uses albuterol with some relief from SOB but admits that she has been out of albuterol for over a week. Pt was diagnosed with lung cancer 3 months ago, but has yet to start treatment because "they haven't called me." She does not smoke anymore, drink, or use drugs. She has NKDA. She has no PSHx. Pt denies any confusion, facial asymmetry, speech difficulty, HA, weakness, palpitations, wheezing, cough, N/V, rhinorrhea, sore throat, dysuria, and difficulty urinating.      The history is provided by the patient and a relative.     Review of patient's allergies indicates:  No Known Allergies  Past Medical History:   Diagnosis Date    COPD (chronic obstructive pulmonary disease)     noted in MD h/p pt not aware of this diagnosis    " Diabetes mellitus     Hypertension     On home oxygen therapy     since jan 2017     No past surgical history on file.  No family history on file.  Social History   Substance Use Topics    Smoking status: Former Smoker     Start date: 2/3/2017    Smokeless tobacco: Never Used    Alcohol use No     Review of Systems   Constitutional: Negative for chills and fever.   HENT: Negative for congestion, rhinorrhea and sore throat.    Eyes: Negative for visual disturbance.   Respiratory: Positive for shortness of breath. Negative for cough and wheezing.    Cardiovascular: Positive for chest pain (none today). Negative for palpitations.   Gastrointestinal: Negative for abdominal pain, diarrhea, nausea and vomiting.   Genitourinary: Negative for decreased urine volume, difficulty urinating, dysuria and vaginal discharge.   Musculoskeletal: Negative for joint swelling, neck pain and neck stiffness.   Skin: Negative for rash and wound.   Neurological: Positive for numbness (and tingling to the L side of body including face). Negative for dizziness, syncope, facial asymmetry, speech difficulty, weakness and headaches.   Psychiatric/Behavioral: Negative for behavioral problems and confusion.       Physical Exam     Initial Vitals [11/25/17 1751]   BP Pulse Resp Temp SpO2   (!) 202/87 94 (!) 24 97.3 °F (36.3 °C) (!) 90 %      MAP       125.33         Vitals:    11/25/17 1938 11/25/17 1940 11/25/17 2002 11/25/17 2003   BP:   (!) 163/91    Pulse: 89 85  88   Resp: 20 18     Temp:       TempSrc:       SpO2:    100%   Weight:       Height:        11/25/17 2032   BP: (!) 179/86   Pulse: 98   Resp:    Temp:    TempSrc:    SpO2: (!) 92%   Weight:    Height:        Physical Exam    Nursing note and vitals reviewed.  Constitutional: She appears well-developed and well-nourished. No distress. Nasal cannula in place.   HENT:   Head: Normocephalic and atraumatic.   Mouth/Throat: Oropharynx is clear and moist.   Eyes: Conjunctivae and EOM  are normal. Pupils are equal, round, and reactive to light.   Neck: Normal range of motion. Neck supple.   Cardiovascular: Normal rate, regular rhythm and normal heart sounds.   No murmur heard.  Pulmonary/Chest: No respiratory distress. She has no wheezes. She has no rhonchi. She has rales.   Bilateral rales to midway up the lung field.   Abdominal: Soft. Bowel sounds are normal. There is no tenderness.   Musculoskeletal: Normal range of motion.   Neurological: She is alert and oriented to person, place, and time. She has normal strength. A sensory deficit is present. No cranial nerve deficit.   PERRL. EOMI. Cranial nerves II-XII intact. No facial asymmetry. Pt reports decreased sensation to entire L side of left body including left face, and upper and lower extremity. Strength 5/5 all four extremities.    Skin: Skin is warm and dry. No rash noted.   Psychiatric: She has a normal mood and affect. Her behavior is normal.         ED Course   Critical Care  Date/Time: 11/25/2017 9:53 PM  Performed by: DISHA CONNOLLY  Authorized by: DISHA CONNOLLY   Direct patient critical care time: 14 minutes  Additional history critical care time: 6 minutes  Ordering / reviewing critical care time: 6 minutes  Documentation critical care time: 6 minutes  Other critical care time: 5 (transfer center discussion) minutes  Total critical care time (exclusive of procedural time) : 37 minutes  Critical care time was exclusive of separately billable procedures and treating other patients.  Critical care was necessary to treat or prevent imminent or life-threatening deterioration of the following conditions: CNS failure or compromise.  Critical care was time spent personally by me on the following activities: blood draw for specimens, development of treatment plan with patient or surrogate, interpretation of cardiac output measurements, examination of patient, ordering and performing treatments and interventions, ordering and review of  radiographic studies, re-evaluation of patient's condition, review of old charts, pulse oximetry, ordering and review of laboratory studies, obtaining history from patient or surrogate and discussions with consultants.        Labs Reviewed   CBC W/ AUTO DIFFERENTIAL - Abnormal; Notable for the following:        Result Value    RDW 15.5 (*)     All other components within normal limits   COMPREHENSIVE METABOLIC PANEL - Abnormal; Notable for the following:     CO2 30 (*)     BUN, Bld 22 (*)     Creatinine 1.7 (*)     Alkaline Phosphatase 54 (*)     ALT 8 (*)     eGFR if  37 (*)     eGFR if non  32 (*)     All other components within normal limits   URINALYSIS - Abnormal; Notable for the following:     Occult Blood UA Trace (*)     Leukocytes, UA Trace (*)     All other components within normal limits   URINALYSIS MICROSCOPIC - Abnormal; Notable for the following:     WBC, UA 12 (*)     WBC Clumps, UA Occasional (*)     Bacteria, UA Few (*)     All other components within normal limits   CULTURE, URINE   CULTURE, URINE   B-TYPE NATRIURETIC PEPTIDE   MAGNESIUM   PHOSPHORUS          X-Rays:   Independently Interpreted Readings:   Chest X-Ray: L upper lobe lung mass and chronic interstitial changes. No PTX or pleural effusion.      Imaging Results          CT Head Without Contrast (Final result)  Result time 11/25/17 19:26:17    Final result by Emmanuel Pang MD (11/25/17 19:26:17)                 Impression:        I attenuating focus within the right hemipons, extending into the right cerebral peduncle, concerning for hemorrhage, possibly hemorrhagic infarct or high attenuating/hemorrhagic metastatic lesion, this is new since the previous examination, correlation advised.  Neurosurgical consultation recommended.    Sinus disease.            Electronically signed by: EMMANUEL PANG MD  Date:     11/25/17  Time:    19:26              Narrative:    Comparison: 2/4/2017    Clinical  history: Left-sided numbness    Technique:    Axial images of the brain were obtained at 5-mm intervals from the skull base to the vertex without the administration of contrast.    Findings:    There is generalized cerebral volume loss.  There is hypoattenuation in a periventricular fashion, likely sequela of chronic microvascular ischemic change.  There is a high attenuating focus involving the right hemipons, extending into the right cerebellar peduncle, measuring approximately 1.9 x 1.9 cm concerning for hemorrhage, possibly hemorrhagic infarct.  There is slight mass effect upon the right aspect of the fourth ventricle however no hydrocephalus or herniation at this time.  There are aerated secretions within the right maxillary sinus, otherwise the visualized paranasal sinuses and mastoid air cells are clear, and there is no evidence of calvarial fracture.  The visualized soft tissues are unremarkable.                             X-Ray Chest PA And Lateral (Final result)  Result time 11/25/17 19:18:45    Final result by Guillaume Segura MD (11/25/17 19:18:45)                 Impression:         Left PICC line tip overlies the SVC.    Enlarging left hilar/left upper lobe mass, increased in size since prior study, suggesting interval progression of disease in the chest in this patient with history of metastatic breast cancer.    Chronic interstitial lung disease            Electronically signed by: GUILLAUME SEGURA MD  Date:     11/25/17  Time:    19:18              Narrative:    Chest PA and Lateral    Indication:SOB.    Comparison:February 3, 2017.    Findings:     Left PICC line tip overlies the SVC.    Enlarging left hilar/left upper lobe mass, increased in size since prior study.    Diffuse chronic coarse increased interstitial markings throughout the lungs without significant change from prior study.    Heart and lungs otherwise unchanged when allowing for differences in technique and positioning.                               Medical Decision Making:   Independently Interpreted Test(s):   I have ordered and independently interpreted X-rays - see prior notes.  Clinical Tests:   Lab Tests: Ordered and Reviewed  Radiological Study: Ordered and Reviewed  ED Management:  Emergent evaluation of 62-year-old female with complaint of worsening shortness of breath, history of lung cancer.  Patient also complained of left-sided body paresthesias and numbness.  Vital signs reveal hypertension, afebrile.  On exam there is subjective decreased sensation to the left face, arm and leg.  There was no demonstrable weakness.  Additionally, she had bilateral radials but no respiratory distress.  Although I suspect patient increased shortness of breath is due to being out of her home oxygen and albuterol, I did consider other causes.  Chest x-ray shows interval enlargement of her lung mass as well as her chronic interstitial lung disease, no evidence of heart failure, no profound anemia contributing to it.  I considered possibility pulmonary embolism given her cancer history and short chest pains - this remains likely.  However, CTA and possible due to her renal insufficiency, and anti-coagulation not wise considering her other issue.  Labs do reveal evidence of a UTI on urinalysis, she is asymptomatic but culture was sent.  More concerning however is the newfound brain metastasis on CT scan with hemorrhagic component.  Patient's blood pressure improved without specific intervention.  I did discuss the case with the transfer center who discussed with neurosurgery.  The patient will be transferred ER to ER for further evaluation and care.  Other:   I have discussed this case with another health care provider.       <> Summary of the Discussion:   7:46 PM - Discussed CT findings with transfer center who will consult nurse neurosurgery.  8:07 PM - Discussed pt with transfer center. Pt will be transferred ER to ER after transfer center reviewed  the case with neurosurgery.            Scribe Attestation:   Scribe #1: I performed the above scribed service and the documentation accurately describes the services I performed. I attest to the accuracy of the note.    Attending Attestation:           Physician Attestation for Scribe:  Physician Attestation Statement for Scribe #1: I, Dr. Galvin, reviewed documentation, as scribed by Laura Hicks in my presence, and it is both accurate and complete.                 ED Course      Clinical Impression:     1. Brain metastasis    2. Other nontraumatic intracerebral hemorrhage, unspecified laterality    3. Primary malignant neoplasm of left lung metastatic to other site    4. Left sided numbness    5. Urinary tract infection without hematuria, site unspecified                               Ban Galvin MD  11/25/17 1651

## 2017-11-26 NOTE — HPI
Ms Pop is a 62 year old female with history of lung cancer diagnosed in January and s/p chemo that ended in July that presents to the ED after 1 month of worsening L sided numbness and parasthesias along with some mild weakness. Denies changes in vision, denies nausea/vomiting. CT of the head at [facility] showed R side of nehemias and R cerebellar peduncle. Denies anticoagulation/antiplatelets

## 2017-11-26 NOTE — PLAN OF CARE
Problem: Patient Care Overview  Goal: Plan of Care Review  Outcome: Ongoing (interventions implemented as appropriate)  No acute events throughout the day, VS and assessment per flow sheet, patient progressing towards goal as tolerated. Plan of care reviewed with Licha Pop and family, all concerns addressed. Will continue to monitor.     Pt transported to MRI on portable monitor via bed. Returned to 7069, placed on bedside monitors. Alarms set and audible. NCC notified of completion of MRI. Pt tolerated well.

## 2017-11-26 NOTE — PLAN OF CARE
Problem: Patient Care Overview  Goal: Plan of Care Review  Outcome: Ongoing (interventions implemented as appropriate)  No acute events occurred throughout the shift. See flowsheets for assessments and VS. Plan of care reviewed with Licha Pop and Licha Pop's family. All questions answered in turn. Pt progressing towards goals as noted. Will continue to monitor.

## 2017-11-26 NOTE — ED NOTES
Patient Identifiers for Licha Pop checked and correct  LOC: The patient is awake, alert and aware of environment with an appropriate affect, the patient is oriented x 3 and speaking appropriate.  APPEARANCE: Patient resting comfortably and in no acute distress, appears chronically ill,patient is clean and well groomed, patient's clothing is properly fastened.  SKIN: The skin is warm and dry, patient has normal skin turgor and dry mucus membranes,no rashes or lesions.Skin Intact , No Breakdown Noted  Musculoskeletal :  Normal range of motion noted. Moves all extremeties well, No swelling or tenderness noted  RESPIRATORY: Airway is open and patent, respirations are spontaneous, patient has a normal effort and rate.Bilateral Lungs Sounds with congestion, R>L  CARDIAC: Patient has a normal rate and rhythm, no periphreal edema noted, capillary refill < 3 seconds.   ABDOMEN: Soft and non tender to palpation, no distention noted. Bowels Sounds are +  PULSES: 2+  And symmetrical in all extremeties  NEUROLOGIC: PERRL, facial expression is symmetrical, patient moving all extremities, normal sensation in all extremities when touched with a finger.The patient is awake, alert and cooperative with a calm affect, patient is aware of environment.    Will continue to monitor

## 2017-11-26 NOTE — SUBJECTIVE & OBJECTIVE
(Not in a hospital admission)    Review of patient's allergies indicates:  No Known Allergies    Past Medical History:   Diagnosis Date    COPD (chronic obstructive pulmonary disease)     noted in MD h/p pt not aware of this diagnosis    Diabetes mellitus     Hypertension     On home oxygen therapy     since jan 2017     No past surgical history on file.  Family History     None        Social History Main Topics    Smoking status: Former Smoker     Start date: 2/3/2017    Smokeless tobacco: Never Used    Alcohol use No    Drug use: No    Sexual activity: Not on file     Review of Systems   Constitutional: Negative for activity change and appetite change.   HENT: Negative for facial swelling and hearing loss.    Eyes: Negative for photophobia, discharge and visual disturbance.   Respiratory: Positive for shortness of breath. Negative for apnea and chest tightness.    Cardiovascular: Negative for chest pain.   Gastrointestinal: Negative for abdominal distention and abdominal pain.   Endocrine: Negative for cold intolerance and heat intolerance.   Genitourinary: Negative for difficulty urinating and dyspareunia.   Musculoskeletal: Negative for arthralgias and back pain.   Skin: Negative for color change.   Neurological: Positive for light-headedness and numbness. Negative for dizziness, seizures, facial asymmetry, speech difficulty and headaches.     Objective:     Weight: 60.8 kg (134 lb)  Body mass index is 23 kg/m².  Vital Signs (Most Recent):  Temp: 98 °F (36.7 °C) (11/25/17 2147)  Pulse: 87 (11/26/17 0118)  Resp: 18 (11/25/17 1940)  BP: (!) 156/80 (11/26/17 0118)  SpO2: 95 % (11/26/17 0118) Vital Signs (24h Range):  Temp:  [97.3 °F (36.3 °C)-98 °F (36.7 °C)] 98 °F (36.7 °C)  Pulse:  [85-98] 87  Resp:  [18-24] 18  SpO2:  [90 %-100 %] 95 %  BP: (150-202)/() 156/80                           Neurosurgery Physical Exam   -Alert and oriented x4  -PERRL, EOMI, face symmetrical, tongue midline, facial  sensation decreased on the left to light touch, shoulder shrug full strength and symmetric  -Motor: 4+/5 throughout the LUE/LLE, 5/5 throughout the RUE/RLE  -sensation decreased to light touch in LLE/LUE  -reflexes 2+ in patella and brachioradialis bilaterally  -no clonus, no Adams's      Significant Labs:    Recent Labs  Lab 11/25/17 1902   GLU 89      K 3.9      CO2 30*   BUN 22*   CREATININE 1.7*   CALCIUM 9.8   MG 1.9       Recent Labs  Lab 11/25/17 1902   WBC 6.61   HGB 12.6   HCT 39.4        No results for input(s): LABPT, INR, APTT in the last 48 hours.  Microbiology Results (last 7 days)     Procedure Component Value Units Date/Time    Urine culture [[id number]] Collected:  11/25/17 2127    Order Status:  No result Specimen:  Urine from Clean Catch Updated:  11/25/17 2127    Urine culture [[id number]]     Order Status:  Completed Specimen:  Urine from Urine, Clean Catch         All pertinent labs from the last 24 hours have been reviewed.    Significant Diagnostics:  CT: Ct Head Without Contrast    Result Date: 11/25/2017  I attenuating focus within the right hemipons, extending into the right cerebral peduncle, concerning for hemorrhage, possibly hemorrhagic infarct or high attenuating/hemorrhagic metastatic lesion, this is new since the previous examination, correlation advised.  Neurosurgical consultation recommended. Sinus disease. Electronically signed by: EMMANUEL LEE MD Date:     11/25/17 Time:    19:26

## 2017-11-26 NOTE — PROGRESS NOTES
Pt transferred to and from MRI on cardiac monitoring and O2. No acute events occurred throughout travel. MRI with contrast was unable to be performed because of low GFR. Will continue to monitor.

## 2017-11-27 PROBLEM — I10 ESSENTIAL HYPERTENSION: Status: ACTIVE | Noted: 2017-01-01

## 2017-11-27 NOTE — PLAN OF CARE
11/27/17 1556   Discharge Assessment   Assessment Type Discharge Planning Assessment   Confirmed/corrected address and phone number on facesheet? Yes   Assessment information obtained from? Patient;Caregiver   Expected Length of Stay (days) 5   Communicated expected length of stay with patient/caregiver yes   Prior to hospitilization cognitive status: Alert/Oriented   Prior to hospitalization functional status: Independent   Current cognitive status: Alert/Oriented   Current Functional Status: Needs Assistance   Lives With spouse;child(kevin), adult;grandchild(kevin)   Able to Return to Prior Arrangements yes   Is patient able to care for self after discharge? Yes   Who are your caregiver(s) and their phone number(s)? Alec Pop (son) [phone]   Patient's perception of discharge disposition home or selfcare   Readmission Within The Last 30 Days no previous admission in last 30 days   Patient currently being followed by outpatient case management? No   Patient currently receives any other outside agency services? No   Equipment Currently Used at Home cane, straight   Do you have any problems affording any of your prescribed medications? No   Is the patient taking medications as prescribed? yes   Does the patient have transportation home? Yes   Transportation Available family or friend will provide   Does the patient receive services at the [facility]? No   Discharge Plan A Home with family   Discharge Plan B Home with family   Patient/Family In Agreement With Plan yes         Discharge/ My Health Packet Folder Given to patient/family:      Yes        PCP:  NONE.  Per patient, her PCP left and she has not chosen an new PCP yet        Pharmacy:    [id number] - [place], [place] - [address]  [address]  [place] [address]  Phone: [phone] Fax: [phone]      Emergency Contacts:  Extended Emergency Contact Information  Primary Emergency Contact: Alec Pop    [place]  Home Phone: [phone]  Relation: Son    Insurance:  Payor: [employer] MANAGED MEDICARE / Plan: [employer] SNP (SPECIAL NEEDS PLAN) / Product Type: Medicare Advantage /       Jeannie Dawn RN, CCRN-K, [facility]  Neuro-Critical Care   X [phone]

## 2017-11-27 NOTE — SUBJECTIVE & OBJECTIVE
Interval History:  Plan for CT C/A/P today, and then DC home with close outpatient f/u with rad onc, H/O, and NSGY.    Review of Systems   Respiratory: Negative for cough and shortness of breath.    Cardiovascular: Negative for chest pain.   Gastrointestinal: Negative for abdominal pain, nausea and vomiting.   Neurological: Positive for weakness.   Psychiatric/Behavioral: Negative for agitation and behavioral problems.     Objective:     Vitals:  Temp: 98 °F (36.7 °C) (11/27/17 1105)  Pulse: 80 (11/27/17 1405)  Resp: (!) 24 (11/27/17 1405)  BP: 138/68 (11/27/17 1405)  SpO2: (!) 94 % (11/27/17 1405)    Temp:  [97.7 °F (36.5 °C)-99 °F (37.2 °C)] 98 °F (36.7 °C)  Pulse:  [] 80  Resp:  [16-37] 24  SpO2:  [91 %-100 %] 94 %  BP: (123-155)/() 138/68        11/26 0701 - 11/27 0700  In: 2465 [P.O.:240; I.V.:1725]  Out: 1700 [Urine:1700]    Physical Exam  GA: Alert, comfortable, no acute distress.   Cardiac: RRR S1 & S2 w/o rubs/murmurs/gallops.   Abdominal: Bowel sounds present. Abdo soft  Skin: No jaundice, rashes, or visible lesions.  Neuro:  --GCS: E4 V5 M6  --Mental Status:  AAOx3  --CN II-XII grossly intact.   --Pupils 3mm, PERRL.   --Corneal reflex, gag, cough intact.  --5/5 strength BUE/BLE    Medications:  Continuous Scheduled  losartan-hydrochlorothiazide 100-12.5 mg 1 tablet Daily   predniSONE 10 mg Daily   PRN  acetaminophen 650 mg Q6H PRN   albuterol-ipratropium 2.5mg-0.5mg/3mL 3 mL Q4H PRN   dextrose 50% 12.5 g PRN   glucagon (human recombinant) 1 mg PRN   hydrALAZINE 10 mg Q8H PRN   insulin aspart 0-5 Units Q6H PRN   omnipaque 15 mL PRN   ondansetron 4 mg Q8H PRN     Today I personally reviewed pertinent medications, lines/drains/airways, imaging, cardiology, lab results, microbiology results,

## 2017-11-27 NOTE — DISCHARGE SUMMARY
[facility]  Discharge Summary      Admit Date: 11/25/2017    Discharge Date and Time:  11/28/2017    Attending Physician: Kd Barrientos MD     Reason for Admission: L sided numbness    Procedures Performed: * No surgery found *    HPI:  The patient is a 62 y.o. female with hx of: HTN, DM, COPD on home O2 and lung cancer + mets who presented to the ED with a complaint of numbness in the left face, arm, and leg for the last month a/w inability to hold anything in her left hand, SOB, and mild coughing. She stated she had some L-sided weakness but denies speech difficulties, facial droop, fever, or chills. Head CT showed a pontine hemorrhage v hemorrhagic met.      She recently finished a round of chemotherapy for lung cancer and her oncologist is at [facility]. She is on 2L home O2.     Hospital Course (synopsis of major diagnoses, care, treatment, and services provided during the course of the hospital stay): Patient was admitted to [facility] for monitoring.  She was found to have new lesions in both the R nehemias and R cerebellar peduncle.  She was evaluated by both NSGY and H/O.  CT C/A/P completed.  Plans for outpatient f/u with NSGY, H/O, and rad onc.      Consults: as above    Significant Diagnostic Studies: Labs:   CMP   Recent Labs  Lab 11/27/17 0446 11/28/17  0250    139   K 4.4 4.5    105   CO2 26 28   * 89   BUN 22* 24*   CREATININE 1.1 1.3   CALCIUM 9.0 9.4   PROT 6.8 7.1   ALBUMIN 3.0* 3.2*   BILITOT 0.2 0.2   ALKPHOS 48* 55   AST 14 15   ALT 7* 6*   ANIONGAP 5* 6*   ESTGFRAFRICA >60.0 51.5*   EGFRNONAA 54.7* 44.7*   , CBC   Recent Labs  Lab 11/27/17 0446 11/28/17  0250   WBC 6.49 7.88   HGB 9.9* 10.8*   HCT 31.7* 33.4*    177   , INR   Lab Results   Component Value Date    INR 1.0 11/28/2017    INR 1.0 11/27/2017    INR 1.0 11/26/2017   , Lipid Panel No results found for: CHOL, HDL, LDLCALC, TRIG, CHOLHDL, Troponin No results for input(s): TROPONINI in the last 168  hours., A1C:   Recent Labs  Lab 11/26/17  0239   HGBA1C 6.5*    and All labs within the past 24 hours have been reviewed  Microbiology:   Blood Culture No results found for: LABBLOO and Urine Culture    Lab Results   Component Value Date    LABURIN No significant growth 11/25/2017     Cardiac Graphics: Echocardiogram:   2D echo with color flow doppler:   Results for orders placed or performed during the hospital encounter of 11/25/17   2D echo with color flow doppler   Result Value Ref Range    EF 65 55 - 65    Est. 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       Discharged Condition: stable    Disposition: Home-Health Care [facility]    Follow Up/Patient Instructions:     Medications:  Reconciled Home Medications:   Current Discharge Medication List      CONTINUE these medications which have NOT CHANGED    Details   valsartan-hydrochlorothiazide (DIOVAN-HCT) 160-12.5 mg per tablet Take 1 tablet by mouth once daily.      acetaminophen (TYLENOL) 500 MG tablet Take 500 mg by mouth nightly as needed for Pain.      predniSONE (DELTASONE) 10 MG tablet Take 10 mg by mouth once daily.             Discharge Procedure Orders  Diet general       Follow-up Information     [facility] RADIATION ONCOLOGY.    Specialty:  Radiation Oncology  Contact  information:  [address]  [facility] [address]  [phone]           Gianni Galvin MD.    Specialties:  Neurosurgery, Spine Surgery  Contact information:  [address]  [facility] [id number] [phone]             [facility] HEMATOLOGY/ONCOLOGY.    Specialty:  Hematology and Oncology  Contact information:  [address]  [facility] [address]  [phone]

## 2017-11-27 NOTE — SUBJECTIVE & OBJECTIVE
Interval History: Pt evaluated by Dr. Galvin    Medications:  Continuous Infusions:   Scheduled Meds:   losartan-hydrochlorothiazide 100-12.5 mg  1 tablet Oral Daily    predniSONE  10 mg Oral Daily     PRN Meds:acetaminophen, albuterol-ipratropium 2.5mg-0.5mg/3mL, dextrose 50%, glucagon (human recombinant), hydrALAZINE, insulin aspart, ondansetron     Review of Systems  Objective:     Weight: 49.5 kg (109 lb 2 oz)  Body mass index is 18.73 kg/m².  Vital Signs (Most Recent):  Temp: 98 °F (36.7 °C) (11/27/17 1105)  Pulse: 87 (11/27/17 1205)  Resp: (!) 22 (11/27/17 1205)  BP: 123/73 (11/27/17 1205)  SpO2: (!) 93 % (11/27/17 1205) Vital Signs (24h Range):  Temp:  [97.7 °F (36.5 °C)-99 °F (37.2 °C)] 98 °F (36.7 °C)  Pulse:  [] 87  Resp:  [16-37] 22  SpO2:  [91 %-100 %] 93 %  BP: (123-155)/() 123/73       Date 11/27/17 0700 - 11/28/17 0659   Shift 2721-9027 4407-8355 6700-9633 24 Hour Total   I  N  T  A  K  E   P.O. 350   350    I.V.  (mL/kg) 395  (8)   395  (8)    Shift Total  (mL/kg) 745  (15.1)   745  (15.1)   O  U  T  P  U  T   Shift Total  (mL/kg)       Weight (kg) 49.5 49.5 49.5 49.5                        Physical Exam:  Vitals reviewed.    Constitutional: She appears well-developed. She is not diaphoretic. No distress.     Eyes: Pupils are equal, round, and reactive to light.     Cardiovascular: Normal rate.     Abdominal: Soft.     Psych/Behavior: She is alert. She is oriented to person, place, and time. She has a normal mood and affect.     Neurological:        Cranial nerves: Cranial nerve(s) II, III, IV, V, VI, VII, VIII, IX, X, XI and XII are intact.   diminished sensation to EZ/LE  E4V5M6  FCx4  AOx3  PERRL       Significant Labs:    Recent Labs  Lab 11/25/17 1902 11/26/17  0239 11/27/17  0446   GLU 89 105 132*    142 139   K 3.9 4.0 4.4    106 108   CO2 30* 25 26   BUN 22* 26* 22*   CREATININE 1.7* 1.4 1.1   CALCIUM 9.8 9.6 9.0   MG 1.9 1.8 1.9       Recent Labs  Lab 11/25/17 1902  11/26/17  0239 11/27/17 0446   WBC 6.61 7.59 6.49   HGB 12.6 10.6* 9.9*   HCT 39.4 33.5* 31.7*    165 156       Recent Labs  Lab 11/26/17  0239 11/27/17 0446   INR 1.0 1.0     Microbiology Results (last 7 days)     Procedure Component Value Units Date/Time    Urine culture [[id number]] Collected:  11/25/17 2127    Order Status:  Completed Specimen:  Urine from Clean Catch Updated:  11/27/17 0925     Urine Culture, Routine No significant growth    Urine culture [[id number]]     Order Status:  Completed Specimen:  Urine from Urine, Clean Catch         Recent Lab Results       11/27/17  1217 11/27/17 0446 11/26/17  1802      Immature Granulocytes  0.3      Immature Grans (Abs)  0.02      Albumin  3.0(L)      Alkaline Phosphatase  48(L)      ALT  7(L)      Anion Gap  5(L)      AST  14      Baso #  0.03      Basophil%  0.5      Total Bilirubin  0.2  Comment:  For infants and newborns, interpretation of results should be based  on gestational age, weight and in agreement with clinical  observations.  Premature Infant recommended reference ranges:  Up to 24 hours.............<8.0 mg/dL  Up to 48 hours............<12.0 mg/dL  3-5 days..................<15.0 mg/dL  6-29 days.................<15.0 mg/dL        BUN, Bld  22(H)      Calcium  9.0      Chloride  108      CO2  26      Creatinine  1.1      Differential Method  Automated      eGFR if   >60.0      eGFR if non   54.7  Comment:  Calculation used to obtain the estimated glomerular filtration  rate (eGFR) is the CKD-EPI equation.   (A)      Eos #  0.1      Eosinophil%  0.8      Glucose  132(H)      Gran #  4.7      Gran%  72.7      Hematocrit  31.7(L)      Hemoglobin  9.9(L)      Coumadin Monitoring INR  1.0  Comment:  Coumadin Therapy:  2.0 - 3.0 for INR for all indicators except mechanical heart valves  and antiphospholipid syndromes which should use 2.5 - 3.5.        Lymph #  1.1      Lymph%  16.6(L)      Magnesium  1.9      MCH   28.1      MCHC  31.2(L)      MCV  90      Mono #  0.6      Mono%  9.1      MPV  10.9      nRBC  0      Phosphorus  3.6      Platelets  156      POCT Glucose 138(H)  198(H)     Potassium  4.4      Total Protein  6.8      Protime  10.8      RBC  3.52(L)      RDW  15.5(H)      Sodium  139      WBC  6.49          All pertinent labs from the last 24 hours have been reviewed.    Significant Diagnostics:  CT: No results found in the last 24 hours.  MRI: Mri Brain With Contrast    Result Date: 11/26/2017  Right pontine and right cerebellar enhancing lesions, concerning for metastatic disease, correlating with the 11/26/17 brain MRI performed at 0307 hrs..  No additional lesions identified. ______________________________________ Electronically signed by resident: WILFRIDO JANG MD Date:     11/26/17 Time:    16:27 As the supervising and teaching physician, I personally reviewed the images and resident's interpretation and I agree with the findings. Electronically signed by: Carlton Vazquez MD Date:     11/26/17 Time:    16:51   I have reviewed all pertinent imaging results/findings within the past 24 hours.

## 2017-11-27 NOTE — PLAN OF CARE
Problem: Patient Care Overview  Goal: Plan of Care Review  Outcome: Ongoing (interventions implemented as appropriate)  Plan of care reviewed with patient at 1700. Questions and concerns addressed. Patient verbalizes understanding. Plan to discharge home after CT scan today. See flowsheets for more details.

## 2017-11-27 NOTE — PROGRESS NOTES
[facility]  Neurocritical Care  Progress Note    Admit Date: 11/25/2017  Service Date: 11/27/2017  Length of Stay: 1    Subjective:     Chief Complaint: Pontine lesion    History of Present Illness: No notes on file    Hospital Course: No notes on file    Interval History:  Plan for CT C/A/P today, and then DC home with close outpatient f/u with rad onc, H/O, and NSGY.    Review of Systems   Respiratory: Negative for cough and shortness of breath.    Cardiovascular: Negative for chest pain.   Gastrointestinal: Negative for abdominal pain, nausea and vomiting.   Neurological: Positive for weakness.   Psychiatric/Behavioral: Negative for agitation and behavioral problems.     Objective:     Vitals:  Temp: 98 °F (36.7 °C) (11/27/17 1105)  Pulse: 80 (11/27/17 1405)  Resp: (!) 24 (11/27/17 1405)  BP: 138/68 (11/27/17 1405)  SpO2: (!) 94 % (11/27/17 1405)    Temp:  [97.7 °F (36.5 °C)-99 °F (37.2 °C)] 98 °F (36.7 °C)  Pulse:  [] 80  Resp:  [16-37] 24  SpO2:  [91 %-100 %] 94 %  BP: (123-155)/() 138/68        11/26 0701 - 11/27 0700  In: 2465 [P.O.:240; I.V.:1725]  Out: 1700 [Urine:1700]    Physical Exam  GA: Alert, comfortable, no acute distress.   Cardiac: RRR S1 & S2 w/o rubs/murmurs/gallops.   Abdominal: Bowel sounds present. Abdo soft  Skin: No jaundice, rashes, or visible lesions.  Neuro:  --GCS: E4 V5 M6  --Mental Status:  AAOx3  --CN II-XII grossly intact.   --Pupils 3mm, PERRL.   --Corneal reflex, gag, cough intact.  --5/5 strength BUE/BLE    Medications:  Continuous Scheduled  losartan-hydrochlorothiazide 100-12.5 mg 1 tablet Daily   predniSONE 10 mg Daily   PRN  acetaminophen 650 mg Q6H PRN   albuterol-ipratropium 2.5mg-0.5mg/3mL 3 mL Q4H PRN   dextrose 50% 12.5 g PRN   glucagon (human recombinant) 1 mg PRN   hydrALAZINE 10 mg Q8H PRN   insulin aspart 0-5 Units Q6H PRN   omnipaque 15 mL PRN   ondansetron 4 mg Q8H PRN     Today I personally reviewed pertinent medications,  lines/drains/airways, imaging, cardiology, lab results, microbiology results,    Assessment/Plan:     Neuro   * Pontine lesion    See SCLC        Vasogenic cerebral edema    As seen on cerebral imaging        Brain compression    2/2 mets  NSGY consulted and following        Pulmonary   COPD (chronic obstructive pulmonary disease)    On home O2        Cardiac/Vascular   Essential hypertension    Losartan-HCTZ        Renal/   FRANCA (acute kidney injury)    Improving/resolved  IVF  Monitor        Oncology   Brain metastasis    See SCLC        Small cell lung cancer    With suspected mets to brain  H/O following  Outpatient rad onc   Outpatient NSGY f/u with Dr. Galvin  Outpatient H/O  Continue home prednisone            Prophylaxis:  Venous Thromboembolism: mechanical  Stress Ulcer: NA  Ventilator Pneumonia: not applicable     Activity Orders          Commode at bedside starting at 11/26 1241        Full Code    Andra Lozada MD  Neurocritical Care  [facility]

## 2017-11-27 NOTE — ASSESSMENT & PLAN NOTE
R pontine and R cerebellar peduncle lesion, possibly hemorrhagic, mild left sided numbness and weakness, likely 2/2 known extensive SCLC    -MRI reviewed   - multiple lesions w/likely addition Dz given pathology  -Rec consulting [facility] for whole brain irradiatiation    - Will schedule FU w/Dr. Galvin to discuss additional SRS options once completed  -SBP <150  -q1h neuro checks  -Care per NCC   - Clear for discharge from neurosurgical standpoint

## 2017-11-27 NOTE — PROGRESS NOTES
1600: Transported patient to CT via wheelchair with Rn, monitor, and o2. VSS, no distress.     1630: Transferred back to room via wheelchair. Called NCC team, update given to Dr. Lozada. Dr. Lozada states to hold off on discharging until CT is read by radiologist.

## 2017-11-27 NOTE — PROGRESS NOTES
[facility]  Neurosurgery  Progress Note    Subjective:     History of Present Illness: Ms Pop is a 62 year old female with history of lung cancer diagnosed in January and s/p chemo that ended in July that presents to the ED after 1 month of worsening L sided numbness and parasthesias along with some mild weakness. Denies changes in vision, denies nausea/vomiting. CT of the head at [facility] showed R side of nehemias and R cerebellar peduncle. Denies anticoagulation/antiplatelets    Post-Op Info:  * No surgery found *         Interval History: Pt evaluated by Dr. Galvin    Medications:  Continuous Infusions:   Scheduled Meds:   losartan-hydrochlorothiazide 100-12.5 mg  1 tablet Oral Daily    predniSONE  10 mg Oral Daily     PRN Meds:acetaminophen, albuterol-ipratropium 2.5mg-0.5mg/3mL, dextrose 50%, glucagon (human recombinant), hydrALAZINE, insulin aspart, ondansetron     Review of Systems  Objective:     Weight: 49.5 kg (109 lb 2 oz)  Body mass index is 18.73 kg/m².  Vital Signs (Most Recent):  Temp: 98 °F (36.7 °C) (11/27/17 1105)  Pulse: 87 (11/27/17 1205)  Resp: (!) 22 (11/27/17 1205)  BP: 123/73 (11/27/17 1205)  SpO2: (!) 93 % (11/27/17 1205) Vital Signs (24h Range):  Temp:  [97.7 °F (36.5 °C)-99 °F (37.2 °C)] 98 °F (36.7 °C)  Pulse:  [] 87  Resp:  [16-37] 22  SpO2:  [91 %-100 %] 93 %  BP: (123-155)/() 123/73       Date 11/27/17 0700 - 11/28/17 0659   Shift 1085-2338 6206-2226 6739-3340 24 Hour Total   I  N  T  A  K  E   P.O. 350   350    I.V.  (mL/kg) 395  (8)   395  (8)    Shift Total  (mL/kg) 745  (15.1)   745  (15.1)   O  U  T  P  U  T   Shift Total  (mL/kg)       Weight (kg) 49.5 49.5 49.5 49.5                        Physical Exam:  Vitals reviewed.    Constitutional: She appears well-developed. She is not diaphoretic. No distress.     Eyes: Pupils are equal, round, and reactive to light.     Cardiovascular: Normal rate.     Abdominal: Soft.     Psych/Behavior: She is  alert. She is oriented to person, place, and time. She has a normal mood and affect.     Neurological:        Cranial nerves: Cranial nerve(s) II, III, IV, V, VI, VII, VIII, IX, X, XI and XII are intact.   diminished sensation to EZ/LE  E4V5M6  FCx4  AOx3  PERRL       Significant Labs:    Recent Labs  Lab 11/25/17 1902 11/26/17 0239 11/27/17 0446   GLU 89 105 132*    142 139   K 3.9 4.0 4.4    106 108   CO2 30* 25 26   BUN 22* 26* 22*   CREATININE 1.7* 1.4 1.1   CALCIUM 9.8 9.6 9.0   MG 1.9 1.8 1.9       Recent Labs  Lab 11/25/17 1902 11/26/17 0239 11/27/17 0446   WBC 6.61 7.59 6.49   HGB 12.6 10.6* 9.9*   HCT 39.4 33.5* 31.7*    165 156       Recent Labs  Lab 11/26/17 0239 11/27/17 0446   INR 1.0 1.0     Microbiology Results (last 7 days)     Procedure Component Value Units Date/Time    Urine culture [[id number]] Collected:  11/25/17 2127    Order Status:  Completed Specimen:  Urine from Clean Catch Updated:  11/27/17 0925     Urine Culture, Routine No significant growth    Urine culture [[id number]]     Order Status:  Completed Specimen:  Urine from Urine, Clean Catch         Recent Lab Results       11/27/17  1217 11/27/17  0446 11/26/17  1802      Immature Granulocytes  0.3      Immature Grans (Abs)  0.02      Albumin  3.0(L)      Alkaline Phosphatase  48(L)      ALT  7(L)      Anion Gap  5(L)      AST  14      Baso #  0.03      Basophil%  0.5      Total Bilirubin  0.2  Comment:  For infants and newborns, interpretation of results should be based  on gestational age, weight and in agreement with clinical  observations.  Premature Infant recommended reference ranges:  Up to 24 hours.............<8.0 mg/dL  Up to 48 hours............<12.0 mg/dL  3-5 days..................<15.0 mg/dL  6-29 days.................<15.0 mg/dL        BUN, Bld  22(H)      Calcium  9.0      Chloride  108      CO2  26      Creatinine  1.1      Differential Method  Automated      eGFR if   >60.0       eGFR if non   54.7  Comment:  Calculation used to obtain the estimated glomerular filtration  rate (eGFR) is the CKD-EPI equation.   (A)      Eos #  0.1      Eosinophil%  0.8      Glucose  132(H)      Gran #  4.7      Gran%  72.7      Hematocrit  31.7(L)      Hemoglobin  9.9(L)      Coumadin Monitoring INR  1.0  Comment:  Coumadin Therapy:  2.0 - 3.0 for INR for all indicators except mechanical heart valves  and antiphospholipid syndromes which should use 2.5 - 3.5.        Lymph #  1.1      Lymph%  16.6(L)      Magnesium  1.9      MCH  28.1      MCHC  31.2(L)      MCV  90      Mono #  0.6      Mono%  9.1      MPV  10.9      nRBC  0      Phosphorus  3.6      Platelets  156      POCT Glucose 138(H)  198(H)     Potassium  4.4      Total Protein  6.8      Protime  10.8      RBC  3.52(L)      RDW  15.5(H)      Sodium  139      WBC  6.49          All pertinent labs from the last 24 hours have been reviewed.    Significant Diagnostics:  CT: No results found in the last 24 hours.  MRI: Mri Brain With Contrast    Result Date: 11/26/2017  Right pontine and right cerebellar enhancing lesions, concerning for metastatic disease, correlating with the 11/26/17 brain MRI performed at 0307 hrs..  No additional lesions identified. ______________________________________ Electronically signed by resident: WILFRIDO JANG MD Date:     11/26/17 Time:    16:27 As the supervising and teaching physician, I personally reviewed the images and resident's interpretation and I agree with the findings. Electronically signed by: Carlton Vazquez MD Date:     11/26/17 Time:    16:51   I have reviewed all pertinent imaging results/findings within the past 24 hours.    Assessment/Plan:     * Pontine lesion    R pontine and R cerebellar peduncle lesion, possibly hemorrhagic, mild left sided numbness and weakness, likely 2/2 known extensive SCLC    -MRI reviewed   - multiple lesions w/likely addition Dz given pathology  -Rec consulting [facility] for  whole brain irradiatiation    - Will schedule FU w/Dr. Galvin to discuss additional SRS options once completed  -SBP <150  -q1h neuro checks  -Care per NCC   - Clear for discharge from neurosurgical standpoint               Kaylie Vásquez MD  Neurosurgery  [facility]

## 2017-11-27 NOTE — ASSESSMENT & PLAN NOTE
With suspected mets to brain  H/O following  Outpatient rad onc   Outpatient NSGY f/u with Dr. Galvin  Outpatient H/O  Continue home prednisone

## 2017-11-27 NOTE — PLAN OF CARE
Problem: Patient Care Overview  Goal: Plan of Care Review  Outcome: Ongoing (interventions implemented as appropriate)  POC reviewed with patient. Pt verbalized understanding. Questions and concerns addressed. No acute events overnight. Pt progressing toward goals. Will continue to monitor. See flow sheets for full assessment and VS info

## 2017-11-28 NOTE — PLAN OF CARE
11/28/17 1104   Final Note   Assessment Type Final Discharge Note   Discharge Disposition Home-Health   Hospital Follow Up  Appt(s) scheduled? Yes   Right Care Referral Info   Post Acute Recommendation Home-care   Referral Type [facility]     [facility] to deliver a home O2 tank to bedside for patient to transport home with.       Future Appointments  Date Time Provider Department Center   11/30/2017 10:00 AM SRUTHI Esteban [facility] IMPRICL [address] [facility]   12/8/2017 12:30 PM Gianni Galvin MD [facility] NEUROSC [address]   12/8/2017 1:30 PM Flex Flores MD [facility] RAD ONC [address]         Follow-up Information     [facility] RADIATION ONCOLOGY.    Specialty:  Radiation Oncology  Why:  Hospital follow up Please call  [phone] to schedule  Contact information:  [address]  [facility] [address]  [phone]           Gianni Galvin MD On 12/8/2017.    Specialties:  Neurosurgery, Spine Surgery  Why:  Hospital follow up at 12:30 pm   Contact information:  [address]  [facility] [address]  [phone]             Schedule an appointment as soon as possible for a visit with Jose Oliva MD.    Specialties:  Hematology and Oncology, Hematology  Why:  Hospital follow up, Allegra with Dr. Oliva will call with appointment.   Contact information:  [address]  [facility] [id number] [phone]             [facility] On 11/30/2017.    Specialty:  Priority Care  Why:  Hospital follow up at 10am  Rhina Donohue   Contact information:  [address]  [facility] [id number] [phone]  Additional information:  [facility] Care Home Health [facility] Today.    Why:  Home Health   Contact information:  [address] [place], [place] [address] [address] [phone]             [facility].    Specialty:  DME Provider  Why:  DME/  Home Oxygen  Contact  information:  [address]  [address]  [place] [address]  [phone]                   Jeannie Dawn RN, CCRN-K, [facility]  Neuro-Critical Care   [phone]

## 2017-11-28 NOTE — PLAN OF CARE
[facility]    HOME HEALTH ORDERS  FACE TO FACE ENCOUNTER    Patient Name: Licha Pop  YOB: 1955    PCP: No primary care provider on file.   PCP Address: No primary physician on file.  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       Future Appointments  Date Time Provider Department Center   11/30/2017 10:00 AM SRUTHI Esteban [facility]     Follow-up Information     [facility].    Specialty:  Radiation Oncology  Contact information:  [address] [facility] [id number] [phone]           Gianni Galvin MD.    Specialties:  Neurosurgery, Spine Surgery  Contact information:  [address]  [place] [address]  [phone]             Jose Oliva MD.    Specialties:  Hematology and Oncology, Hematology  Why:  Hospital follow up, Allegra with Dr. Oliva will call with appointment.   Contact information:  [address] [facility] [address]  [phone]             Torres Qu[facility] On 11/30/2017.    Specialty:  Priority Care  Why:  Hospital follow up at 10am  Rhina Donohue   Contact information:  [address] [facility] [id number] [phone]  Additional  information:  [facility]                   I have seen and examined this patient face to face today. My clinical findings that support the need for the home health skilled services and home bound status are the following:  Weakness/numbness causing balance and gait disturbance due to Malignancy/Cancer making it taxing to leave home.    Allergies:Review of patient's allergies indicates:  No Known Allergies    Diet: regular diet    Activities: activity as tolerated    Nursing:   SN to complete comprehensive assessment including routine vital signs. Instruct on disease process and s/s of complications to report to MD. Review/verify medication list sent home with the patient at time of discharge  and instruct patient/caregiver as needed. Frequency may be adjusted depending on start of care date.    Notify MD if SBP > 160 or < 90; DBP > 90 or < 50; HR > 120 or < 50; Temp > 101;     CONSULTS:    Physical Therapy to evaluate and treat. Evaluate for home safety and equipment needs; Establish/upgrade home exercise program. Perform / instruct on therapeutic exercises, gait training, transfer training, and Range of Motion.  Occupational Therapy to evaluate and treat. Evaluate home environment for safety and equipment needs. Perform/Instruct on transfers, ADL training, ROM, and therapeutic exercises.  Speech Therapy  to evaluate and treat for  Language, Swallowing and Cognition.    MISCELLANEOUS CARE:  N/A    WOUND CARE ORDERS  n/a      Medications: Review discharge medications with patient and family and provide education.      Current Discharge Medication List      CONTINUE these medications which have NOT CHANGED    Details   valsartan-hydrochlorothiazide (DIOVAN-HCT) 160-12.5 mg per tablet Take 1 tablet by mouth once daily.      acetaminophen (TYLENOL) 500 MG tablet Take 500 mg by mouth nightly as needed for Pain.      predniSONE (DELTASONE) 10 MG tablet Take 10 mg by mouth  once daily.             I certify that this patient is confined to her home and needs physical therapy, speech therapy and occupational therapy.

## 2017-11-28 NOTE — PLAN OF CARE
Call recieved from Jennifer @ 10:51a; equipment delivered @ 11:16p; Oxygen tank.  Call was made to RT @ 11:02 spoke to Andrinne.

## 2017-11-28 NOTE — PT/OT/SLP EVAL
Physical Therapy  Evaluation    Licha Pop   MRN: [id number]   Admitting Diagnosis: Pontine lesion    PT Received On: 11/28/17  PT Start Time: 0832     PT Stop Time: 0842    PT Total Time (min): 10 min       Billable Minutes:  Evaluation 10     Personal factors/comorbidities: COPD; HTN; DM. The listed co-morbidities and personal factors impact pt's current level and progress with functional mobility and independence.   Body systems elements affected: lower extremities, upper extremities, trunk, musculoskeletal system, neuromuscular system, cardiovascular, pulmonary system  Impairments: see assessment below  Clinical Presentation: stable  Functional Outcome Tools: AMPAC, MMT, ROM  Evaluation Complexity Level: low      Diagnosis: Pontine lesion      Past Medical History:   Diagnosis Date    COPD (chronic obstructive pulmonary disease)     noted in MD h/p pt not aware of this diagnosis    Diabetes mellitus     Hypertension     On home oxygen therapy     since jan 2017      History reviewed. No pertinent surgical history.    Referring physician: Andra Lozada MD  Date referred to PT: 11/27/17       General Precautions: Standard, fall  Orthopedic Precautions: N/A   Braces: N/A       Do you have any cultural, spiritual, Judaism conflicts, given your current situation?: none stated    Patient History:  Lives with: 2 adult sons and granddaughter  [facility]Level of assist: Mod (I) using SPC for mobility; (I) with all ADLs, including some driving, cooking, and cleaning.   Bath: tub/shower  DME: SPC    Roles/responsibilities: mother, grandmother, friend  Hobbies/Interests: watching tv; cooking/cleaning    Assist available upon d/c: family able to assist upon d/c      Subjective:  Communicated with RN prior to session.  Pt agreeable to PT evaluation.       Chief Complaint: none stated  Patient goals: To go home and return to [facility]           Objective:   Patient found with: oxygen, telemetry,  pulse ox (continuous), blood pressure cuff     Cognitive Exam:  Oriented to: Person, Place, Time and Situation    Follows Commands/attention: Follows multistep  commands  Communication: clear/fluent  Safety awareness/insight to disability: intact    Physical Exam:  Postural examination/scapula alignment: Rounded shoulder and Head forward    Skin integrity: Visible skin intact  Edema: None noted     Sensation:   Intact to light touch; numbness reported in L UE and LE with L side trunk      Upper Extremity Range of Motion:  Right Upper Extremity: WFL  Left Upper Extremity: WFL    Upper Extremity Strength:  Right Upper Extremity: WFL  Left Upper Extremity: WFL    Lower Extremity Range of Motion:  Right Lower Extremity: WFL  Left Lower Extremity: WFL    Lower Extremity Strength:  Right Lower Extremity: Grossly 4/5 throughout  Left Lower Extremity: Hip flexion: 4-/5; Knee extension: 4/5; DF: 4/5     Fine motor coordination:  Intact finger opposition  Decreased R heel to shin    Gross motor coordination: WFL    Functional Mobility:  Bed Mobility:  Not assessed due to pt found up in chair    Transfers:   Sit to stand:Contact Guard Assistance with No Assistive Device from EOB        Gait:   Patient ambulated ~20 feet with No Assistive Device with Stand-by Assistance.  Pt demonstrated step-through gait with appropriate step length and stride length with no instability or LOB noted.        Balance:  Static Sitting: Independent   Dynamic Sitting: Independent   Static Standing: Supervision or Set-up Assistance   Dynamic Standing: Supervision or Set-up Assistance    SLS: instability noted on R LE compared to L LE with LOB & CGA for safety   Tandem stance: able to place R LE in place with no sway noted; increase sway noted with L foot forward in stance      AM-PAC 6 CLICK MOBILITY  How much help from another person does this patient currently need?   1 = Unable, Total/Dependent Assistance  2 = A lot, Maximum/Moderate  Assistance  3 = A little, Minimum/Contact Guard/Supervision  4 = None, Modified [place]/Independent    Turning over in bed (including adjusting bedclothes, sheets and blankets)?: 4  Sitting down on and standing up from a chair with arms (e.g., wheelchair, bedside commode, etc.): 3  Moving from lying on back to sitting on the side of the bed?: 3  Moving to and from a bed to a chair (including a wheelchair)?: 3  Need to walk in hospital room?: 3  Climbing 3-5 steps with a railing?: 3  Total Score: 19     AM-PAC Raw Score CMS G-Code Modifier Level of Impairment Assistance   6 % Total / Unable   7 - 9 CM 80 - 100% Maximal Assist   10 - 14 CL 60 - 80% Moderate Assist   15 - 19 CK 40 - 60% Moderate Assist   20 - 22 CJ 20 - 40% Minimal Assist   23 CI 1-20% SBA / CGA   24 CH 0% Independent/ Mod I     Patient left up in chair with all lines intact and call button in reach.    Assessment:   Licha Pop is a 62 y.o. female with a medical diagnosis of Pontine lesion and presents with decrease endurance, generalized weakness, and decrease balance limiting pt's safety with functional independence, but demonstrates good family support upon d/c.  Pt tolerated session well.  Pt would benefit from continued skilled physical therapy for the listed impairments to improve functional independence and overall safety with mobility prior to d/c. PT recommends d/c disposition to home with HHPT and OT for further safety and progression with endurance to continue performing all ADLs.       Education:  Education provided to pt regarding: PT role/POC. Verbalized understanding.     Whiteboard updated with correct mobility information. RN/PCT notified.  Appropriate to walk with 1 person assist.     Please encourage pt to sit in bedside chair throughout the day to promote OOB mobility and decrease risk of deconditioning while in acute care.           Rehab identified problem list/impairments: Rehab identified problem  list/impairments: weakness, impaired endurance, impaired balance, gait instability    Rehab potential is good.    Activity tolerance: Good    Discharge recommendations: Discharge Facility/Level Of Care Needs: home health PT, home health OT     Barriers to discharge: Barriers to Discharge: None    Equipment recommendations: Equipment Needed After Discharge: none     GOALS:    Physical Therapy Goals        Problem: Physical Therapy Goal    Goal Priority Disciplines Outcome Goal Variances Interventions   Physical Therapy Goal     PT/OT, PT      Description:  Goals to be met by: 17     Patient will increase functional independence with mobility by performin. Supine to sit with Modified [place]  2. Sit to supine with Modified [place]  3. Sit to stand transfer with Supervision  4. Gait  x 50 feet with Supervision using Single-point Cane.   5. Ascend/descend 4 stair with right Handrails Stand-by Assistance.   6. Lower extremity exercise program x 15 reps with independence for endurance and general strengthening beneficial for all functional activities.                       PLAN:    Patient to be seen 2 x/week to address the above listed problems via therapeutic activities, gait training, therapeutic exercises, neuromuscular re-education  Plan of Care expires: 17  Plan of Care reviewed with: patient    Functional Assessment Tool Used: ampac  Score: 19  Functional Limitation: Mobility: Walking and moving around  Mobility: Walking and Moving Around Current Status (): CK  Mobility: Walking and Moving Around Goal Status (): GERDA Hernandez, PT  2017

## 2017-11-28 NOTE — PT/OT/SLP EVAL
Occupational Therapy  Evaluation/Discharge    Licha Pop   MRN: [id number]   Admitting Diagnosis: Pontine lesion    OT Date of Treatment: 11/28/17   OT Start Time: 0930  OT Stop Time: 0945  OT Total Time (min): 15 min    Billable Minutes:  Evaluation 15    Diagnosis: Pontine lesion     Past Medical History:   Diagnosis Date    COPD (chronic obstructive pulmonary disease)     noted in MD h/p pt not aware of this diagnosis    Diabetes mellitus     Hypertension     On home oxygen therapy     since jan 2017      History reviewed. No pertinent surgical history.    General Precautions: Standard, fall  Orthopedic Precautions: N/A  Braces: N/A    Patient History:  Living Environment  Living Environment Comment: Pt lives with her two adult sons, one of which is able to provide 24/7 SPV.  Her home is raised with 12STE RHR.  She has a standard tub/shower combo.  She uses a straight cane and supplemental oxygen.  Pt reports full (I)'ce with ADL/mobility PTA.  Equipment Currently Used at Home: cane, straight, oxygen    Subjective:  Communicated with RN prior to session.  Chief Complaint: none  Patient/Family stated goals: none    Pain/Comfort  Pain Rating 1: 0/10  Pain Rating Post-Intervention 1: 0/10    Objective:  Patient found with: oxygen, telemetry, pulse ox (continuous), blood pressure cuff    Cognitive Exam:  Oriented to: Person, Place, Time and Situation  Follows Commands/attention: Follows multistep  commands  Communication: clear/fluent  Memory:  No Deficits noted  Safety awareness/insight to disability: intact  Coping skills/emotional control: Appropriate to situation    Visual/perceptual:  Intact    Physical Exam:  Postural examination/scapula alignment: No postural abnormalities identified  Skin integrity: Visible skin intact  Edema: None noted     Sensation:   Intact    Upper Extremity Range of Motion:  Right Upper Extremity: WFL  Left Upper Extremity: WFL    Upper Extremity Strength:  Right Upper  "Extremity: 4+/5 grossly  Left Upper Extremity: 3+/5 grossly   Strength: WFL bilaterally     Fine motor coordination:   Intact    Functional Mobility:    Transfers:  Sit <> Stand Assistance: Supervision  Sit <> Stand Assistive Device: No Assistive Device  Bed <> Chair Technique: Stand Pivot  Bed <> Chair Transfer Assistance: Supervision  Bed <> Chair Assistive Device: No Assistive Device    Activities of Daily Living:    UE Dressing Level of Assistance: Supervision     LE Dressing Level of Assistance: Supervision    Balance:   Pt was able to maintain standing balance during dynamic trunk excursions when bending/reaching to gather clothing items from various surface levels    Therapeutic Activities and Exercises:  Pt educated on OT role/discharge    AM-PAC 6 CLICK ADL  How much help from another person does this patient currently need?  1 = Unable, Total/Dependent Assistance  2 = A lot, Maximum/Moderate Assistance  3 = A little, Minimum/Contact Guard/Supervision  4 = None, Modified [place]/Independent    Putting on and taking off regular lower body clothing? : 3  Bathing (including washing, rinsing, drying)?: 3  Toileting, which includes using toilet, bedpan, or urinal? : 3  Putting on and taking off regular upper body clothing?: 4  Taking care of personal grooming such as brushing teeth?: 4  Eating meals?: 4  Total Score: 21    AM-PAC Raw Score CMS "G-Code Modifier Level of Impairment Assistance   6 % Total / Unable   7 - 9 CM 80 - 100% Maximal Assist   10-14 CL 60 - 80% Moderate Assist   15 - 19 CK 40 - 60% Moderate Assist   20 - 22 CJ 20 - 40% Minimal Assist   23 CI 1-20% SBA / CGA   24 CH 0% Independent/ Mod I       Patient left up in chair with all lines intact and call button in reach    Assessment:  Licha Pop is a 62 y.o. female with a medical diagnosis of Pontine lesion and presents completing functional transfers with SPV, dressing tasks with SPV, and good dynamic standing " balance.  Pt will not benefit from acute OT services and is safe to d/c home with assist from her son from an OT standpoint. No DME recommendations.      Pt presented with a low complexity OT evaluation. Pt required a brief review of medical history and occupational profile. Pt demod 5+ performance deficits (physical, cognitive, or psychosocial) resulting in limitations and engagement restrictions. Clinical decision making required analytical complexity with limited treatment options. Pt with cormorbidities and required minimal modification of task/assistance with assessment.       Rehab identified problem list/impairments: Rehab identified problem list/impairments: weakness, impaired endurance    Discharge recommendations: Discharge Facility/Level Of Care Needs: home     Barriers to discharge: Barriers to Discharge: Inaccessible home environment    Equipment recommendations: none     GOALS:   None Set    PLAN:  D/C OT    JOHN Lawrence  11/28/2017

## 2017-11-28 NOTE — PLAN OF CARE
Problem: Occupational Therapy Goal  Goal: Occupational Therapy Goal  No OT goals set.  JOHN Lawrence  11/28/2017

## 2017-11-28 NOTE — PT/OT/SLP EVAL
Speech Language Pathology Evaluation    Licha Pop   MRN: [id number]   Admitting Diagnosis: Pontine lesion    Diet recommendations: Solid Diet Level: Regular  Liquid Diet Level: Thin   Universal aspiration precautions     SLP Treatment Date: 11/28/17  Speech Start Time: 0822     Speech Stop Time: 0831     Speech Total (min): 9 min       TREATMENT BILLABLE MINUTES:  Eval 9     Diagnosis: Pontine lesion      Past Medical History:   Diagnosis Date    COPD (chronic obstructive pulmonary disease)     noted in MD h/p pt not aware of this diagnosis    Diabetes mellitus     Hypertension     On home oxygen therapy     since jan 2017     History reviewed. No pertinent surgical history.    Has the patient been evaluated by SLP for swallowing? : Yes  Keep patient NPO?: No   General Precautions: Standard,            Social Hx: Patient lives with sons and granddaughter     Prior diet:  Regular solids and thin liquids     Occupational/hobbies/homemaking: Pt reports completed 9th grade and previously worked as a      Subjective:  Pt awake/ alert and upright in bed     Pain/Comfort  Pain Rating 1: 0/10  Pain Rating Post-Intervention 1: 0/10    Objective:      Oral Musculature Evaluation  Oral Musculature: WFL  Dentition: present and adequate  Mucosal Quality: good  Oral Labial Strength and Mobility: WFL  Lingual Strength and Mobility: WFL  Volitional Cough: strong and clear   Volitional Swallow: prompt; adeqaute hyolaryngeal rise to palpation   Voice Prior to PO Intake: strong and clear      Cognitive Status:  Behavioral Observations: alert, appropriate and cooperative-  Memory and Orientation: WFL   Attention: WFL   Problem Solving: basic WFL; more complex not yet assessed   Pragmatics: WFL   Executive Function: not yet assessed       Auditory Comprehension: able to identify objects, answers yes/no questions and able to follow commands-WFL     Verbal Expression: naming, automatic speech and conversational  speech-WFL    Motor Speech: WFL     Voice: storng and clear     Reading: appears consistent with baseline education level     Writing: able to functionally and legible write name; spontaneous sentence is legible misspellings and grammatical erros appear consistent with baseline     Visual-Spatial: WFL     Bedside Swallow Eval:  Consistencies Assessed: Thin liquids 4oz via open cup  and Solids x3  Oral Phase: WFL  Pharyngeal Phase: no overt clinical  signs/symptoms of aspiration and no overt clinical signs/symptoms of pharyngeal dysphagia    Additional Treatment: Discussed with Pt role of SLP within acute care. Pt pending discharge later this date. Pt appearing at or near cognitive linguistic baseline yet would benefit from ongoing assessment of higher level skill set to ensure safety in home environment.     Assessment:  Licha Pop is a 62 y.o. female with a SLP diagnosis of Cognitive-Linguistic Impairment may be at or near baseline.          Discharge recommendations: Discharge Facility/Level Of Care Needs: home health speech therapy     Goals:    SLP Goals        Problem: SLP Goal    Goal Priority Disciplines Outcome   SLP Goal     SLP    Description:  Speech Language Pathology Goals  Goals expected to be met by 12/5    1. Pt will participate in ongoing assessment of speech-language and cognitive skills                        Plan:   Patient to be seen Therapy Frequency: 2 x/week   Plan of Care expires: 12/27/17  Plan of Care reviewed with: patient  SLP Follow-up?: Yes              Darling Abrams CCC-SLP  11/28/2017

## 2017-11-28 NOTE — ASSESSMENT & PLAN NOTE
With suspected mets to brain  H/O following  Outpatient rad onc   Outpatient NSGY f/u with Dr. Galvin  Outpatient H/O  Continue home prednisone; patient encouraged to take pepcid OTC at home if GERD symptoms develop

## 2017-11-28 NOTE — PLAN OF CARE
Problem: Physical Therapy Goal  Goal: Physical Therapy Goal  Goals to be met by: 17     Patient will increase functional independence with mobility by performin. Supine to sit with Modified [place]  2. Sit to supine with Modified [place]  3. Sit to stand transfer with Supervision  4. Gait  x 50 feet with Supervision using Single-point Cane.   5. Ascend/descend 4 stair with right Handrails Stand-by Assistance.   6. Lower extremity exercise program x 15 reps with independence for endurance and general strengthening beneficial for all functional activities.           PT evaluation completed. POC and goals established.  Recommend d/c home with family support/assist and HHPT/OT for continued endurance and safety with all functional activities.     Inez Hernandez, PT, DPT  2017

## 2017-11-28 NOTE — TELEPHONE ENCOUNTER
----- Message from Alfredito Drake sent at 11/28/2017 10:04 AM CST -----  Contact:    Will like to schedule f/u appt with Dr. Matt Michael  (son)Contact::[phone]

## 2017-11-28 NOTE — PLAN OF CARE
Problem: SLP Goal  Goal: SLP Goal  Pt seen for speech evaluation this date. Pt appears safe to resume regular diet and speech-language skills appear at/near cognitive linguistic baseline but would benefit from ongoing assessment of higher level skill set.    Darling Abrams MS, CCC-SLP  Speech Language Pathologist  Pager: [phone]  Date 11/28/2017

## 2017-11-28 NOTE — PROGRESS NOTES
PIV removed and port heparin locked and then removed. Needle removed safely, dressing applied. Awaiting delivery of home O2 and appts to be made by case management and pt will be discharged home.

## 2017-11-28 NOTE — PROGRESS NOTES
Pt discharged home. All IVs removed. Port-a-cath heparin locked prior to needle removal. Home oxygen therapy given prior to discharge. PT/OT visited. AVS printed and discussed with patient.

## 2017-11-28 NOTE — PROGRESS NOTES
[facility]  Neurocritical Care  Progress Note    Admit Date: 11/25/2017  Service Date: 11/28/2017  Length of Stay: 2    Subjective:     Chief Complaint: Pontine lesion    History of Present Illness: No notes on file    Hospital Course: No notes on file    Interval History:  DC home today, with outpatient f/u with Rad Onc, H/O, and NSGY.    Review of Systems   Respiratory: Negative for cough and shortness of breath.    Cardiovascular: Negative for chest pain.   Gastrointestinal: Negative for abdominal pain, nausea and vomiting.   Neurological: Negative for weakness, numbness and headaches.   Psychiatric/Behavioral: Negative for agitation and behavioral problems.     Objective:     Vitals:  Temp: 97.6 °F (36.4 °C) (11/28/17 1100)  Pulse: 84 (11/28/17 1125)  Resp: (!) 30 (11/28/17 1125)  BP: 124/72 (11/28/17 1100)  SpO2: 99 % (11/28/17 1125)    Temp:  [97.6 °F (36.4 °C)-99.5 °F (37.5 °C)] 97.6 °F (36.4 °C)  Pulse:  [] 84  Resp:  [19-45] 30  SpO2:  [87 %-100 %] 99 %  BP: (112-151)/(60-79) 124/72        11/27 0701 - 11/28 0700  In: 2305 [P.O.:1750; I.V.:555]  Out: -     Physical Exam    GA: Alert, comfortable, no acute distress.   Respiratory:  Normal respiratory effort  Skin: No jaundice, rashes, or visible lesions.  Neuro:  --GCS: E4 V5 M6  --Mental Status:  AAOx3  --CN II-XII grossly intact.   --Pupils 3mm, PERRL.   --Corneal reflex, gag, cough intact.  --5/5 strength BUE/BLE    Medications:  Continuous Scheduled    heparin (porcine) 5,000 Units Q12H   losartan-hydrochlorothiazide 100-12.5 mg 1 tablet Daily   predniSONE 10 mg Daily   PRN    acetaminophen 650 mg Q6H PRN   albuterol-ipratropium 2.5mg-0.5mg/3mL 3 mL Q4H PRN   dextrose 50% 12.5 g PRN   glucagon (human recombinant) 1 mg PRN   hydrALAZINE 10 mg Q8H PRN   insulin aspart 0-5 Units Q6H PRN   ondansetron 4 mg Q8H PRN     Today I personally reviewed pertinent medications, lines/drains/airways, imaging, cardiology, lab results,  microbiology results,    Assessment/Plan:     Neuro   Vasogenic cerebral edema    As seen on cerebral imaging        Brain compression    2/2 mets  NSGY consulted and following        Pulmonary   COPD (chronic obstructive pulmonary disease)    On home O2        Cardiac/Vascular   Essential hypertension    Losartan-HCTZ        Renal/   FRANCA (acute kidney injury)    Improving since admission; slight bump today.  Encourage po intake for now.  Indeterminate diastolic function; normal systolic function  Monitor        Oncology   Brain metastasis    See [facility]        Small cell lung cancer    With suspected mets to brain  H/O following  Outpatient rad onc   Outpatient NSGY f/u with Dr. Galvin  Outpatient H/O  Continue home prednisone; patient encouraged to take pepcid OTC at home if GERD symptoms develop            Prophylaxis:  Venous Thromboembolism: chemical  Stress Ulcer: NA  Ventilator Pneumonia: not applicable     Activity Orders          Commode at bedside starting at 11/26 1241        Full Code    Andra Lozada MD  Neurocritical Care  [facility]

## 2017-11-28 NOTE — SUBJECTIVE & OBJECTIVE
Interval History:  DC home today, with outpatient f/u with Rad Onc, H/O, and NSGY.    Review of Systems   Respiratory: Negative for cough and shortness of breath.    Cardiovascular: Negative for chest pain.   Gastrointestinal: Negative for abdominal pain, nausea and vomiting.   Neurological: Negative for weakness, numbness and headaches.   Psychiatric/Behavioral: Negative for agitation and behavioral problems.     Objective:     Vitals:  Temp: 97.6 °F (36.4 °C) (11/28/17 1100)  Pulse: 84 (11/28/17 1125)  Resp: (!) 30 (11/28/17 1125)  BP: 124/72 (11/28/17 1100)  SpO2: 99 % (11/28/17 1125)    Temp:  [97.6 °F (36.4 °C)-99.5 °F (37.5 °C)] 97.6 °F (36.4 °C)  Pulse:  [] 84  Resp:  [19-45] 30  SpO2:  [87 %-100 %] 99 %  BP: (112-151)/(60-79) 124/72        11/27 0701 - 11/28 0700  In: 2305 [P.O.:1750; I.V.:555]  Out: -     Physical Exam    GA: Alert, comfortable, no acute distress.   Respiratory:  Normal respiratory effort  Skin: No jaundice, rashes, or visible lesions.  Neuro:  --GCS: E4 V5 M6  --Mental Status:  AAOx3  --CN II-XII grossly intact.   --Pupils 3mm, PERRL.   --Corneal reflex, gag, cough intact.  --5/5 strength BUE/BLE    Medications:  Continuous Scheduled    heparin (porcine) 5,000 Units Q12H   losartan-hydrochlorothiazide 100-12.5 mg 1 tablet Daily   predniSONE 10 mg Daily   PRN    acetaminophen 650 mg Q6H PRN   albuterol-ipratropium 2.5mg-0.5mg/3mL 3 mL Q4H PRN   dextrose 50% 12.5 g PRN   glucagon (human recombinant) 1 mg PRN   hydrALAZINE 10 mg Q8H PRN   insulin aspart 0-5 Units Q6H PRN   ondansetron 4 mg Q8H PRN     Today I personally reviewed pertinent medications, lines/drains/airways, imaging, cardiology, lab results, microbiology results,

## 2017-11-28 NOTE — ASSESSMENT & PLAN NOTE
Improving since admission; slight bump today.  Encourage po intake for now.  Indeterminate diastolic function; normal systolic function  Monitor

## 2017-11-30 PROBLEM — E86.1 HYPOVOLEMIA: Status: RESOLVED | Noted: 2017-01-01 | Resolved: 2017-01-01

## 2017-11-30 PROBLEM — R51.9 HEADACHE: Status: RESOLVED | Noted: 2017-02-04 | Resolved: 2017-01-01

## 2017-11-30 PROBLEM — E11.8 TYPE 2 DIABETES MELLITUS WITH COMPLICATION: Status: ACTIVE | Noted: 2017-01-01

## 2017-11-30 NOTE — PROGRESS NOTES
[facility]  Follow-up Visit Progress Note     PRESENTING HISTORY     PCP: No primary care provider on file.  Chief Complaint/Reason for Visit:  Follow up from recent visit.      No chief complaint on file.      History of Present Illness & ROS: Ms. Licha Pop is a 62 y.o. female.    [facility]  Discharge Summary        Admit Date: 11/25/2017     Discharge Date and Time:  11/28/2017     Attending Physician: Kd Barrientos MD      Reason for Admission: L sided numbness     Procedures Performed: * No surgery found *     HPI:  The patient is a 62 y.o. female with hx of: HTN, DM, COPD on home O2 and lung cancer + mets who presented to the ED with a complaint of numbness in the left face, arm, and leg for the last month a/w inability to hold anything in her left hand, SOB, and mild coughing. She stated she had some L-sided weakness but denies speech difficulties, facial droop, fever, or chills. Head CT showed a pontine hemorrhage v hemorrhagic met.      She recently finished a round of chemotherapy for lung cancer and her oncologist is at [facility] She is on 2L home O2.      Hospital Course (synopsis of major diagnoses, care, treatment, and services provided during the course of the hospital stay): Patient was admitted to [facility] for monitoring.  She was found to have new lesions in both the R nehemias and R cerebellar peduncle.  She was evaluated by both NSGY and H/O.  CT C/A/P completed.  Plans for outpatient f/u with NSGY, H/O, and rad onc.       Consults: as above     Significant Diagnostic Studies: Labs:   CMP   Recent Labs  Lab 11/27/17 0446 11/28/17  0250    139   K 4.4 4.5    105   CO2 26 28   * 89   BUN 22* 24*   CREATININE 1.1 1.3   CALCIUM 9.0 9.4   PROT 6.8 7.1   ALBUMIN 3.0* 3.2*   BILITOT 0.2 0.2   ALKPHOS 48* 55   AST 14 15   ALT 7* 6*   ANIONGAP 5* 6*   ESTGFRAFRICA >60.0 51.5*   EGFRNONAA 54.7* 44.7*   , CBC   Recent Labs  Lab 11/27/17 0446  11/28/17  0250   WBC 6.49 7.88   HGB 9.9* 10.8*   HCT 31.7* 33.4*    177   , INR         Lab Results   Component Value Date     INR 1.0 11/28/2017     INR 1.0 11/27/2017     INR 1.0 11/26/2017   , Lipid Panel No results found for: CHOL, HDL, LDLCALC, TRIG, CHOLHDL, Troponin No results for input(s): TROPONINI in the last 168 hours., A1C:   Recent Labs  Lab 11/26/17  0239   HGBA1C 6.5*    and All labs within the past 24 hours have been reviewed  Microbiology:   Blood Culture No results found for: LABBLOO and Urine Culture          Lab Results   Component Value Date     LABURIN No significant growth 11/25/2017      Cardiac Graphics: Echocardiogram:   2D echo with color flow doppler:         Results for orders placed or performed during the hospital encounter of 11/25/17   2D echo with color flow doppler   Result Value Ref Range     EF 65 55 - 65     Est. PA Systolic Pressure 50.89 (A)       Tricuspid Valve Regurgitation MILD           Final Diagnoses:               Principal Problem: Pontine lesion              Secondary Diagnoses:         Active Hospital Problems     Diagnosis   POA    *Pontine lesion [G93.9]   Yes    Essential hypertension [I10]   Unknown    Brain metastasis [C79.31]   Yes    Brain compression [G93.5]   Yes    Vasogenic cerebral edema [G93.6]   Yes    Hypovolemia [E86.1]   Yes    FRANCA (acute kidney injury) [N17.9]   Yes    Small cell lung cancer [C34.90]   Yes    COPD (chronic obstructive pulmonary disease) [J44.9]   Yes    Shortness of breath [R06.02]   Yes    Pulmonary fibrosis [J84.10]   Yes       Resolved Hospital Problems     Diagnosis Date Resolved POA   No resolved problems to display.         Discharged Condition: stable     Disposition: Home-Health Care [facility]     Follow Up/Patient Instructions:      Medications:  Reconciled Home Medications:       Current Discharge Medication List           CONTINUE these medications which have NOT CHANGED     Details  "  valsartan-hydrochlorothiazide (DIOVAN-HCT) 160-12.5 mg per tablet Take 1 tablet by mouth once daily.       acetaminophen (TYLENOL) 500 MG tablet Take 500 mg by mouth nightly as needed for Pain.       predniSONE (DELTASONE) 10 MG tablet Take 10 mg by mouth once daily.                 Discharge Procedure Orders  Diet general           Follow-up Information      [facility] RADIATION ONCOLOGY.    Specialty:  Radiation Oncology  Contact information:  [address] [address]  [phone]              Gianni Galvin MD.    Specialties:  Neurosurgery, Spine Surgery  Contact information:  [address]  [place] [address]  [phone]                 [facility] HEMATOLOGY/ONCOLOGY.    Specialty:  Hematology and Oncology  Contact information:  [address] [address]  [phone]                       Electronically signed by Andra Lozada MD at 11/28/2017 11:45 AM  Electronically signed by Andra Lozada MD at 11/28/2017 11:46 AM  Electronically signed by Garrett Villar MD at 11/28/2017  3:30 PM        Today :  Ms. Hurt presents to  today, accompanied by her son. Presents with "headaches that still come and go, Tylenol not helping very much". Denies change in vision, falls, dizziness, chest pain or other discomforts. Fluid and food intake is suboptimal, per reporting of "not eating or drinking very much; no very hungry".   Denies fever or chills.       Review of Systems:  Eyes: denies visual changes at this time denies floaters   ENT: no nasal congestion or sore throat  Respiratory: no cough or shorness of breath  Cardiovascular: no chest pain or palpitations  Gastrointestinal: no nausea or vomiting, no abdominal pain or change in bowel habits  Genitourinary: no hematuria or dysuria; denies frequency  Hematologic/Lymphatic: no easy bruising or lymphadenopathy  Musculoskeletal: no arthralgias or myalgias  Neurological: no seizures or tremors; + " Headaches  Endocrine: no heat or cold intolerance      PAST HISTORY:     Past Medical History:   Diagnosis Date    COPD (chronic obstructive pulmonary disease)     noted in MD h/p pt not aware of this diagnosis    Diabetes mellitus     Hypertension     On home oxygen therapy     since jan 2017       No past surgical history on file.    No family history on file.    Social History     Social History    Marital status:      Spouse name: N/A    Number of children: N/A    Years of education: N/A     Social History Main Topics    Smoking status: Former Smoker     Start date: 2/3/2017    Smokeless tobacco: Never Used    Alcohol use No    Drug use: No    Sexual activity: Not on file     Other Topics Concern    Not on file     Social History Narrative    No narrative on file       MEDICATIONS & ALLERGIES:     Current Outpatient Prescriptions on File Prior to Visit   Medication Sig Dispense Refill    acetaminophen (TYLENOL) 500 MG tablet Take 500 mg by mouth nightly as needed for Pain.      predniSONE (DELTASONE) 10 MG tablet Take 10 mg by mouth once daily.      valsartan-hydrochlorothiazide (DIOVAN-HCT) 160-12.5 mg per tablet Take 1 tablet by mouth once daily.       No current facility-administered medications on file prior to visit.         Review of patient's allergies indicates:  No Known Allergies    Medications Reconciliation:   I have reconciled the patient's home medications and discharge medications with the patient/family. I have updated all changes.  Refer to After-Visit Medication List.    OBJECTIVE:     Vital Signs:  There were no vitals filed for this visit.  Wt Readings from Last 1 Encounters:   11/26/17 0300 49.5 kg (109 lb 2 oz)   11/25/17 1751 60.8 kg (134 lb)     There is no height or weight on file to calculate BMI.   Wt Readings from Last 3 Encounters:   11/30/17 42.1 kg (92 lb 13 oz)   11/26/17 49.5 kg (109 lb 2 oz)   06/13/17 41 kg (90 lb 6.2 oz)     Temp Readings from Last 3  Encounters:   11/28/17 97.6 °F (36.4 °C) (Oral)   06/15/17 98.6 °F (37 °C)   06/13/17 97.7 °F (36.5 °C)     BP Readings from Last 3 Encounters:   11/30/17 110/74   11/28/17 130/75   06/15/17 (!) 176/80     Pulse Readings from Last 3 Encounters:   11/30/17 76   11/28/17 84   06/15/17 92       Physical Exam:  General:  No distress.  HEENT: Head is normocephalic, atraumatic; ears are normal.   Eyes: Clear conjunctiva.  Neck: Supple, symmetrical neck; trachea midline.  Lungs: Clear to auscultation bilaterally and normal respiratory effort.   Cardiovascular: Heart with regular rate and rhythm. No murmurs, gallops or rubs  Extremities: No LE edema. Pulses 2+ and symmetric.   Abdomen: Abdomen is soft, non-tender non-distended with normal bowel sounds.  Skin: Skin color, texture, turgor normal. No rashes.  Lymph Nodes: No cervical or supraclavicular adenopathy.  Neurologic: No focal numbness or weakness.   Psychiatric: Not depressed.    Laboratory  Lab Results   Component Value Date    WBC 7.88 11/28/2017    HGB 10.8 (L) 11/28/2017    HCT 33.4 (L) 11/28/2017     11/28/2017    ALT 6 (L) 11/28/2017    AST 15 11/28/2017     11/28/2017    K 4.5 11/28/2017     11/28/2017    CREATININE 1.3 11/28/2017    BUN 24 (H) 11/28/2017    CO2 28 11/28/2017    INR 1.0 11/28/2017    HGBA1C 6.5 (H) 11/26/2017         Diagnostic Results:    2 D echo  Narrative     Date of Procedure: 11/26/2017        TEST DESCRIPTION   Technical Quality: This is a technically adequate study.     Aorta: The aortic root is normal in size, measuring 2.5 cm at sinotubular junction and 3.4 cm at Sinuses of Valsalva. The proximal ascending aorta is normal in size, measuring 2.3 cm across.     Left Atrium: The left atrial volume index is normal, measuring 32.51 cc/m2.     Left Ventricle: The left ventricle is normal in size, with an end-diastolic diameter of 3.5 cm, and an end-systolic diameter of 2.5 cm. LV wall thickness is normal, with the septum  and the posterior wall each measuring 0.7 cm across. Relative wall   thickness was normal at 0.40, and the LV mass index was 42.5 g/m2 consistent with normal left ventricular mass. There are no regional wall motion abnormalities. Left ventricular systolic function appears normal. Visually estimated ejection fraction is   60-65%. The LV Doppler derived stroke volume equals 54.0 ccs.     Diastolic indices: E wave velocity 0.7 m/s, E/A ratio 0.9,  msec., E/e' ratio(avg) 9. Diastolic function is indeterminate.     Right Atrium: The right atrium is normal in size, measuring 3.9 cm in length and 3.1 cm in width in the apical view.     Right Ventricle: The right ventricle is normal in size measuring 3.2 cm at the base in the apical right ventricle-focused view. There is RVH. Global right ventricular systolic function appears normal. Tricuspid annular plane systolic excursion (TAPSE) is   1.5 cm. Tissue Doppler-derived tricuspid annular peak systolic velocity (S prime) is 9.4 cm/s. The estimated PA systolic pressure is 51 mmHg.     Aortic Valve:  The aortic valve is mildly sclerotic.     Mitral Valve:  Mitral valve is normal in structure with normal leaflet mobility.     Tricuspid Valve:  There is mild tricuspid regurgitation. Tricuspid valve is normal in structure with normal leaflet mobility.     Pulmonary Valve:  There is mild pulmonic regurgitation. Pulmonary valve is normal in structure with normal leaflet mobility.     IVC: IVC is normal in size and collapses > 50% with a sniff, suggesting normal right atrial pressure of 3 mmHg.     Intracavitary: There is no evidence of pericardial effusion, intracavity mass, thrombi, or vegetation.         CONCLUSIONS     1 - Normal left ventricular systolic function (EF 60-65%).     2 - No wall motion abnormalities.     3 - Indeterminate LV diastolic function.     4 - Normal right ventricular systolic function .     5 - Pulmonary hypertension. The estimated PA systolic  pressure is 51 mmHg.     6 - Mild tricuspid regurgitation.             This document has been electronically    SIGNED BY: Mikki Real MD On: 11/26/2017 18:37          Procedure comments: The patient was surveyed from the lung apices through the pelvis after the administration of 75 cc Omni 350 IV contrast as well as oral contrast and data was reconstructed for coronal, sagittal, and axial images.    Clinical indication: Squamous cell lung cancer with new metastases to brain     Comparison: PET/CT 7/6/17, 2/21/17    Findings:    There is a left-sided central venous catheter with tip terminating in the superior vena cava.    Examination of the vascular and soft tissue structures at the base of the neck is unremarkable.    A left sided aortic arch with 3 branch vessels is identified.  The thoracic aorta maintains normal caliber, contour, and course with atherosclerotic calcification within its course.  The heart is not enlarged. No pericardial effusion is seen. The trachea is midline and the proximal airways are patent.      There has been interval expansion of a soft tissue mass within the apical posterior segment left upper lobe with questionable invasion into the posterior chest wall, now measuring 4.4 x 3.8 cm, previously measuring 1.7 x 1.3 cm on 7/6/17.      There are paraseptal and centrilobular emphysematous changes as well as honeycombing with basal predominance consistent with interstitial lung disease such as UIP.  No consolidation, pneumothorax, or pleural fluid.  There is elevation of the left caleb-diaphragm.    There has been interval growth of a mass in the anterior mediastinum measuring 8.0 x 6.8 x 7.0 cm (AP x CC x TV) which exhibits central necrosis and encases the left pulmonary artery.    The esophagus maintains a normal course and caliber.   This  The liver is normal in size and attenuation.  No focal hepatic abnormality is seen.  The gallbladder is unremarkable.  No intrahepatic or  extrahepatic biliary ductal dilatation is noted.    The stomach, spleen, pancreas, and right adrenal gland are unremarkable.  The left adrenal gland appears thickened.    The kidneys are normal in size and location.  They concentrate and excrete contrast appropriately.  No hydronephrosis is seen.  The ureters appear normal in course and caliber without evidence of ureteral dilatation. The urinary bladder is unremarkable.     The visualized loops of small and large bowel show no evidence of obstruction or inflammation.      No ascites, free fluid, or intraperitoneal free air is identified.     There is no evidence of lymph node enlargement in the abdomen or pelvis.    The abdominal aorta is normal in course and caliber with atherosclerotic calcifications.    The osseus structures demonstrate age-appropriate degenerative change without evidence of acute fracture or osseus destructive process.      The extraperitoneal soft tissues are unremarkable.   Impression         1. Interval growth of the soft tissue mass within the apical posterior segment left upper lobe with questionable invasion into the posterior chest wall in this patient with known metastatic squamous cell lung cancer.    2.  Interval growth of a mass in the anterior mediastinum which exhibits central necrosis and encases the left pulmonary artery.  This likely represents metastatic spread in this patient with known squamous cell lung cancer.    3.  Paraseptal and centrilobular emphysematous changes as well as basilar predominant honeycombing consistent with UIP.    4.  Thickening of the left adrenal gland which is nonspecific.  ______________________________________     Electronically signed by resident: KANCHAN BERNAL  Date: 11/27/17  Time: 16:57            As the supervising and teaching physician, I personally reviewed the images and resident's interpretation and I agree with the findings.            Electronically signed by: Eddie Villavicencio MD  Date:  11/28/17  Time: 08:38          Time of Procedure: 11/26/17 18:26:00  Accession # [id number]    Reason for study: Rule out DVT.     Comparison: None.    Technique: Duplex scan of the bilateral lower extremity venous structures was performed using B-mode/gray scale imaging and Doppler spectral analysis and color flow.     Findings: No evidence of clot involving the bilateral common femoral, greater saphenous, superficial femoral, popliteal, peroneal, anterior and posterior tibial veins.  All venous structures demonstrate normal respiratory phasicity and augment adequately.  No evidence of soft tissue mass or Baker's cyst.   Impression         No evidence of DVT in bilateral lower extremities.    ______________________________________     Electronically signed by resident: WILFRIDO JANG MD  Date: 11/26/17  Time: 19:33            As the supervising and teaching physician, I personally reviewed the images and resident's interpretation and I agree with the findings.          Electronically signed by: JR PRECIADO MD  Date: 11/26/17  Time: 19:43     Encounter     View Encounter              MRI brain [facility]    11/26/17 15:11:56    Accession# [id number]    CLINICAL INDICATION: 62 year old F  Known lung CA with multiple suspected brain mets      TECHNIQUE:     COMPARISON: MRI brain 11/26/2017.    FINDINGS:  The ventricles are stable in size and configuration, without evidence of hydrocephalus.    Within the right nehemias and extending into the right middle cerebellar peduncle is a peripherally enhancing lesion measuring 1.9 X 1.7 X 2.3 cm in AP X TV X CC dimensions respectively.  A second enhancing lesion within the right lateral cerebellum measures 0.9 x 0.7 x 0.9 cm in AP X TV X CC dimensions respectively.  No additional enhancing lesions are visualized.    No extra-axial blood or fluid collections.     T2 skull base flow voids are preserved. Bone marrow signal intensity is unremarkable.   Impression         Right pontine and  "right cerebellar enhancing lesions, concerning for metastatic disease, correlating with the 11/26/17 brain MRI performed at 0307 hrs..  No additional lesions identified.  ______________________________________     Electronically signed by resident: WILFRIDO JANG MD  Date: 11/26/17  Time: 16:27            As the supervising and teaching physician, I personally reviewed the images and resident's interpretation and I agree with the findings.            Electronically signed by: Carlton Vazquez MD  Date: 11/26/17  Time: 16:51      Chest PA and Lateral    Indication:SOB.    Comparison:February 3, 2017.    Findings:     Left PICC line tip overlies the SVC.    Enlarging left hilar/left upper lobe mass, increased in size since prior study.    Diffuse chronic coarse increased interstitial markings throughout the lungs without significant change from prior study.    Heart and lungs otherwise unchanged when allowing for differences in technique and positioning.   Impression          Left PICC line tip overlies the SVC.    Enlarging left hilar/left upper lobe mass, increased in size since prior study, suggesting interval progression of disease in the chest in this patient with history of metastatic breast cancer.    Chronic interstitial lung disease            Electronically signed by: JR PRECIADO MD  Date: 11/25/17  Time: 19:18          ASSESSMENT & PLAN:     HIGH RISK CONDITION(S):      Recent admission for left facial numbness and left leg weakness 2/2 New Pontine Brain lesion, with suspected Brain Mets and Bone Mets 2/2 Squamous Cell CA of Lung (Dx'd 02/2017), with O2 Dependence:   (S/p Chemo)  (Of note, appears that patient was scheduled to be seen by Dr. Flores, Rad Oncologist, in July, but 'no showed'; seen by Dr. Astudillo); she now has all printed apts with locations.   *Admitted to [facility] upon presentation with Neurosurg on consult  *Seen on consultation by Oncology  - continue Prednisone (out of for "couple of days; " "headaches"); refill sent to her pharmacy  - follow up with Dr. Galvin (consideration for possible gammaknife) and Dr. Flores (Rad Oncologist) on 12/8    *Acute persistent headaches 2/2 new Pontine Lesion with mass effect 2/2 Mets Brain Ca:   Tylenol PRN (advised to try 1st)  Percocet 5/325 every 6 hours PRN (2nd alternative option)        Diabetes Mellitus (II):  *Per the patient, is managed per an outside physician at [facility] (Dr. Salazar); reportedly was on Metformin and "Insulin", but "taking nothing now; hospital told me to stop".  Agree with stopping the Metformin given current medical condition, but may still need the insulin.   Most recent A1c: 6.5% (11/26) (~ )  Concern with the system steroids   Home Range: (130s fasting)   *Random POCT glucose in clinic today: 127 mg/dl  - scheduled to follow up with her PCP at Dr. Jhonson at [facility]    *Will defer with instructions to monitor BS closely and report any level > 180 mg/dl to her provider.       Hypertension:   (with symptomatic hypotensive events documented)  Per JNC 8, goal < 150/90  Today: 110/74  Home Range:  / 52-62 (having low pressures at home)   - continue Diovan 160 daily (discontinue HCT; po intake is poor putting at an increase risk for dehydration and insulting renal status, and BPs are permissible)   (most recent serum creat 1.3 on 11/28)       FRANCA on admit:   (resolved prior to discharge)   Serum Creat: 1.7 on admit  Serum Creat: 1.2 at discharge   - repeat BMP today to surveillance stability        [facility] Visit (Post Discharge Follow-up) Today:   - Our clinic physicians and nurses plan to follow the patient up for any medical issues in the [facility] for 30 days post discharge.    Future Appointments  Date Time Provider Department Center   12/8/2017 12:30 PM Gianni Galvin MD [facility] NEUROSC [facility]   12/8/2017 1:30 PM Flex Flores MD [facility] RAD ONC [facility]   *Will schedule follow up with Dr. Salazar for in 1-2 weeks at " [facility] (her established PCP).          Medication List          Accurate as of 11/30/17 11:32 AM. If you have any questions, ask your nurse or doctor.               START taking these medications    albuterol 0.63 mg/3 mL Nebu  Commonly known as:  ACCUNEB  Take 3 mLs (0.63 mg total) by nebulization every 6 (six) hours as needed. Rescue  Started by:  SRUTHI Sales     oxyCODONE-acetaminophen 5-325 mg per tablet  Commonly known as:  PERCOCET  Take 1 tablet by mouth every 6 (six) hours as needed for Pain.  Started by:  SRUTHI Sales     valsartan 160 MG tablet  Commonly known as:  DIOVAN  Take 1 tablet (160 mg total) by mouth once daily.  Started by:  SRUTHI Sales        CONTINUE taking these medications    acetaminophen 500 MG tablet  Commonly known as:  TYLENOL     predniSONE 10 MG tablet  Commonly known as:  DELTASONE  Take 1 tablet (10 mg total) by mouth once daily.        STOP taking these medications    valsartan-hydrochlorothiazide 160-12.5 mg per tablet  Commonly known as:  DIOVAN-HCT  Stopped by:  SRUTHI Sales           Where to Get Your Medications      These medications were sent to [facility]/pharmacy #[id number] - [place] - [address]  [address] [facility] [address]    Phone:  [phone]   · oxyCODONE-acetaminophen 5-325 mg per tablet  · predniSONE 10 MG tablet  · valsartan 160 MG tablet     Information about where to get these medications is not yet available    Ask your nurse or doctor about these medications  · albuterol 0.63 mg/3 mL Nebu       Signing Physician:  SRUTHI Sales

## 2017-11-30 NOTE — PATIENT INSTRUCTIONS
1) Call and schedule with your PCP physician for in 1-2 weeks.    2) Trial the Tylenol first for pain; if no relief, may try the Percocet.    3) Call with questions: [phone]      [facility] Visit (Post Discharge Follow-up) Today:   - Our clinic physicians and nurses plan to follow the patient up for any medical issues in the [facility] for 30 days post discharge.    Future Appointments  Date Time Provider Department Center   11/30/2017 11:20 AM LAB, APPOINTMENT [facility] LAB IM [facility]   12/8/2017 12:30 PM Gianni Galvin MD [facility] NEUROSC [facility]   12/8/2017 1:30 PM Flex Flores MD [facility] RAD ONC [facility]

## 2017-12-02 NOTE — PT/OT/SLP DISCHARGE
Physical Therapy Discharge Summary    Licha Pop  MRN: [id number]   Pontine lesion     Patient Discharged from acute Physical Therapy on 17.  Please refer to prior PT noted date on 17 for functional status.     Assessment:   Patient was discharge unexpectedly.  Information required to complete and accurate discharge summary is unknown.  Refer to therapy initial evaluation and last progress note for initial and most recent functional status and goal achievement.  Recommendations made may be found in medical record. Patient appropriate for care in another setting. Patient has not met goals.       GOALS:    Physical Therapy Goals     Not on file          Multidisciplinary Problems (Resolved)        Problem: Physical Therapy Goal    Goal Priority Disciplines Outcome Goal Variances Interventions   Physical Therapy Goal   (Resolved)     PT/OT, PT Outcome(s) achieved     Description:  Goals to be met by: 17     Patient will increase functional independence with mobility by performin. Supine to sit with Modified [place]  2. Sit to supine with Modified [place]  3. Sit to stand transfer with Supervision  4. Gait  x 50 feet with Supervision using Single-point Cane.   5. Ascend/descend 4 stair with right Handrails Stand-by Assistance.   6. Lower extremity exercise program x 15 reps with independence for endurance and general strengthening beneficial for all functional activities.                     Reasons for Discontinuation of Therapy Services  Transfer to alternate level of care.      Plan:  Patient Discharged to: Home with HH.    Inez Hernandez, PT, DPT  2017

## 2017-12-11 NOTE — PROGRESS NOTES
REFERRING PHYSICIAN: Dr. Oliva    DIAGNOSIS: Extensive Stage small cell lung cancer, now with symptomatic brain metastases, stage IV.    HISTORY OF PRESENT ILLNESS:   Ms. Pop is a 63 yo woman diagnosed with extensive stage small cell lung CA in 2/2017.  She had bone metastases at diagnosis.  She received 6 cycles of cisplatin/etoposide chemotherapy, which was completed in June 2017.  She had an excellent partial response to initial chemotherapy by PET/CT.  She was scheduled to see me in July 2017 for consideration of PCI and possibly consolidative thoracic RT but apparently did not show for her appointment.  She was lost to follow up in med onc but presented to the ED on 11/25/17 with complaints of left face, arm and leg numbness and possibly some arm and leg weakness.      MRI brain 11/26/17 showed an enhancing lesion within the right nehemias and extending into the right middle cerebellar peduncle measuring 1.9 X 1.7 X 2.3 cm in AP X TV X CC dimensions respectively.  A second enhancing lesion within the right lateral cerebellum measures 0.9 x 0.7 x 0.9 cm in AP X TV X CC dimensions respectively.    CT C/A/P showed progression of both the posterior OMAIRA mass and the anterior mediastinal mass, which now encases the L PA.    She was evaluated by neurosurgery as an inpatient for consideration of Gamma Knife radiosurgery.  Rad onc consult for whole brain RT was recommended.  She was placed on prednisone 10mg daily and Percocet 5s, 1 tab po q6hrs prn.  She was discharged 11/28.      Today Ms. Pop continues to feel somewhat weak on the left side.  She has headaches that are well controlled on her prednisone and with the percocet.  She is here with her son.  Her appetite is good and she is eating well.  It appears she continues to lose weight.  She is on home O2 at 2L/min and reports no worsening of SOB or cough. No hemoptysis.  No back, bone, or pelvic pain.  She says she wants to go back to work at her  job at [employer].  She has quit smoking.    ECO.  ECOG SCORE         REVIEW OF SYSTEMS:   As above.  In addition, patient denies visual problems, dizziness, chest pain, nausea, vomiting, diarrhea.  Patient also denies easy bruising, skin rashes, or numbness or tingling.      PAST MEDICAL HISTORY:  Past Medical History:   Diagnosis Date    COPD (chronic obstructive pulmonary disease)     noted in MD h/p pt not aware of this diagnosis    Diabetes mellitus     Hypertension     On home oxygen therapy     since 2017       PAST SURGICAL HISTORY:  No past surgical history on file.    ALLERGIES:   Review of patient's allergies indicates:  No Known Allergies    MEDICATIONS:  Current Outpatient Prescriptions   Medication Sig    albuterol (ACCUNEB) 0.63 mg/3 mL Nebu Take 3 mLs (0.63 mg total) by nebulization every 6 (six) hours as needed. Rescue    metFORMIN (GLUCOPHAGE) 500 MG tablet Take 500 mg by mouth every evening.    oxyCODONE-acetaminophen (PERCOCET) 5-325 mg per tablet Take 1 tablet by mouth every 6 (six) hours as needed for Pain.    predniSONE (DELTASONE) 10 MG tablet Take 1 tablet (10 mg total) by mouth once daily.    valsartan (DIOVAN) 160 MG tablet Take 1 tablet (160 mg total) by mouth once daily.    acetaminophen (TYLENOL) 500 MG tablet Take 500 mg by mouth nightly as needed for Pain.     No current facility-administered medications for this visit.        SOCIAL HISTORY:  Social History     Social History    Marital status:      Spouse name: N/A    Number of children: N/A    Years of education: N/A     Occupational History    Not on file.     Social History Main Topics    Smoking status: Former Smoker     Start date: 2/3/2017    Smokeless tobacco: Never Used    Alcohol use No    Drug use: No    Sexual activity: Not on file     Other Topics Concern    Not on file     Social History Narrative    No narrative on file   Lives with her son in [place].     FAMILY HISTORY:  No  "family history on file.      PHYSICAL EXAMINATION:  BP (!) 167/81 (BP Location: Right arm, Patient Position: Sitting, BP Method: Small (Automatic))   Pulse 93   Resp 16   Ht 5' 4" (1.626 m)   Wt 42 kg (92 lb 8 oz)   LMP  (LMP Unknown)   BMI 15.88 kg/m²   GENERAL: Patient is alert and oriented, in no acute distress.  Cachectic.  HEENT:Extraocular muscles are intact.  Oropharynx is clear without lesions.  There is no cervical or supraclavicular lymphadenopathy palpated.  No thyromegaly noted.  HEART: Regular rate and rhythm.  LUNGS: Clear to auscultation bilaterally.  ABDOMEN:Soft, nontender, nondistended, without hepatosplenomegaly.  Normoactive bowel sounds.  EXTREMITIES: No clubbing, cyanosis, or edema.  MSK: no spinal or pelvic tenderness.  NEUROLOGICAL: Cranial nerve II through XII grossly intact.  Sensation is intact.  Strength is 5 out of 5 in the upper and lower extremities bilaterally, perhaps 5-/5 in the L LE wrt plantar/dorsiflexion, knee flex/ext, and hip flex.  Gait is normal.      ASSESSMENT:  61 yo woman with symptomatic brain mets from ES-SCLC. Stage IV.    PLAN:  I had a long discussion with the patient and her son regarding her diagnosis, prognosis, and treatment options.  Whole brain RT is indicated.  There is no definite role for SRS in this situation as it is presumed in small cell ca that there are diffuse subclinical brain mets.  She and her son understand that she is in an incurable situation and that the goal of any treatment is to improve QOL/reduce symptoms.   Because the prednisone seems to be controlling any edema I will give her a refill of 10mg/day.  She understands she is to taper this when directed, and to not stop it suddenly.  I will also refill her percocet 5s.  I instructed her not to take her Norco.   The patient will follow up with Dr. Oliva as scheduled to discuss 2nd line chemo vs. Hospice.  I will also discuss the option of palliative thoracic RT with Dr." Hazel, though the patient is relatively asymptomatic from her chest disease at this point.    The risks, benefits, and side effects of radiation were explained in detail to the patient and her son. All of their questions were answered and informed consent was signed. I plan to see the patient back for radiation planning CT tomorrow, with whole brain RT to start on Wednesday 12/13.      I spent approximately 60 minutes reviewing the available records and evaluating the patient, out of which over 50% of the time was spent face to face with the patient in counseling and coordinating this patient's care.    Distress Screening Results: Psychosocial Distress screening score of Distress Score: 2 noted and reviewed. No intervention indicated.

## 2017-12-11 NOTE — LETTER
December 11, 2017      Jose Oliva MD  [address]  [facility] [address]           [facility]  [address]  [facility] [address]  Phone: [phone]          Patient: Licha Pop   MR Number: [id number]   YOB: 1955   Date of Visit: 12/11/2017       Dear Dr. Jose Oliva:    Thank you for referring Licha Pop to me for evaluation. Attached you will find relevant portions of my assessment and plan of care.    If you have questions, please do not hesitate to call me. I look forward to following Licha Pop along with you.    Sincerely,    Flex Flores MD    Enclosure  CC:  No Recipients    If you would like to receive this communication electronically, please contact [email or web address] or [phone] to request more information on [employer] access.    For providers and/or their staff who would like to refer a patient to [facility], please contact us through our one-stop-shop provider referral line, [facility], at [phone].    If you feel you have received this communication in error or would no longer like to receive these types of communications, please e-mail [email or web address]

## 2017-12-28 NOTE — PROGRESS NOTES
Received request that patient is in need of assistance for transportation for her next 3 radiation treatments. Verified that with her nurse, Beverley, that the patient is independent and can get self in and out of the cab unassisted. Faxed the request to [facility] and spoke to Chata to confirm receipt. They will contact me back with the quote.

## 2017-12-29 NOTE — PLAN OF CARE
Problem: Patient Care Overview  Goal: Plan of Care Review  Outcome: Ongoing (interventions implemented as appropriate)  Pt. On day 7 of xrt to brain.  Pt. Doing well.  C/o intermittent h/a.  In WC.  Weight decrease 7 lbs in 3 weeks.  Given decadron taper.

## 2018-01-01 ENCOUNTER — TELEPHONE (OUTPATIENT)
Dept: RADIATION ONCOLOGY | Facility: CLINIC | Age: 63
End: 2018-01-01

## 2018-01-01 ENCOUNTER — HOSPITAL ENCOUNTER (OUTPATIENT)
Dept: RADIATION THERAPY | Facility: HOSPITAL | Age: 63
Discharge: HOME | End: 2018-01-02
Attending: RADIOLOGY
Payer: MEDICARE

## 2018-01-01 ENCOUNTER — DOCUMENTATION ONLY (OUTPATIENT)
Dept: HEMATOLOGY/ONCOLOGY | Facility: CLINIC | Age: 63
End: 2018-01-01

## 2018-01-01 ENCOUNTER — DOCUMENTATION ONLY (OUTPATIENT)
Dept: RADIATION ONCOLOGY | Facility: CLINIC | Age: 63
End: 2018-01-01

## 2018-01-01 ENCOUNTER — HOSPITAL ENCOUNTER (INPATIENT)
Facility: OTHER | Age: 63
LOS: 3 days | DRG: 682 | End: 2018-02-14
Attending: EMERGENCY MEDICINE | Admitting: INTERNAL MEDICINE
Payer: MEDICARE

## 2018-01-01 ENCOUNTER — APPOINTMENT (OUTPATIENT)
Dept: RADIATION THERAPY | Facility: HOSPITAL | Age: 63
End: 2018-01-01
Attending: RADIOLOGY
Payer: MEDICARE

## 2018-01-01 VITALS
WEIGHT: 67.44 LBS | OXYGEN SATURATION: 100 % | HEART RATE: 87 BPM | HEIGHT: 64 IN | TEMPERATURE: 94 F | DIASTOLIC BLOOD PRESSURE: 50 MMHG | RESPIRATION RATE: 22 BRPM | BODY MASS INDEX: 11.51 KG/M2 | SYSTOLIC BLOOD PRESSURE: 67 MMHG

## 2018-01-01 DIAGNOSIS — C79.31 BRAIN METASTASIS: ICD-10-CM

## 2018-01-01 DIAGNOSIS — C79.31 BRAIN METASTASIS: Primary | ICD-10-CM

## 2018-01-01 DIAGNOSIS — R53.1 WEAKNESS: ICD-10-CM

## 2018-01-01 DIAGNOSIS — R53.83 FATIGUE, UNSPECIFIED TYPE: Primary | ICD-10-CM

## 2018-01-01 DIAGNOSIS — C80.1 MALIGNANCY: ICD-10-CM

## 2018-01-01 DIAGNOSIS — R06.02 SHORTNESS OF BREATH: ICD-10-CM

## 2018-01-01 DIAGNOSIS — C34.90 SMALL CELL CARCINOMA OF LUNG, UNSPECIFIED LATERALITY: Primary | ICD-10-CM

## 2018-01-01 DIAGNOSIS — N17.9 AKI (ACUTE KIDNEY INJURY): ICD-10-CM

## 2018-01-01 LAB
ALBUMIN SERPL BCP-MCNC: 3.4 G/DL
ALLENS TEST: ABNORMAL
ALP SERPL-CCNC: 59 U/L
ALT SERPL W/O P-5'-P-CCNC: 17 U/L
ANION GAP SERPL CALC-SCNC: 10 MMOL/L
ANION GAP SERPL CALC-SCNC: 11 MMOL/L
ANION GAP SERPL CALC-SCNC: 15 MMOL/L
ANION GAP SERPL CALC-SCNC: 21 MMOL/L
AST SERPL-CCNC: 31 U/L
BACTERIA #/AREA URNS HPF: ABNORMAL /HPF
BASOPHILS # BLD AUTO: 0 K/UL
BASOPHILS # BLD AUTO: 0.01 K/UL
BASOPHILS NFR BLD: 0 %
BASOPHILS NFR BLD: 0.1 %
BILIRUB SERPL-MCNC: 0.7 MG/DL
BILIRUB UR QL STRIP: ABNORMAL
BUN SERPL-MCNC: 48 MG/DL
BUN SERPL-MCNC: 53 MG/DL
BUN SERPL-MCNC: 65 MG/DL
BUN SERPL-MCNC: 71 MG/DL
CALCIUM SERPL-MCNC: 10 MG/DL
CALCIUM SERPL-MCNC: 8.6 MG/DL
CALCIUM SERPL-MCNC: 9 MG/DL
CALCIUM SERPL-MCNC: 9.2 MG/DL
CHLORIDE SERPL-SCNC: 107 MMOL/L
CHLORIDE SERPL-SCNC: 111 MMOL/L
CHLORIDE SERPL-SCNC: 115 MMOL/L
CHLORIDE SERPL-SCNC: 116 MMOL/L
CLARITY UR: CLEAR
CO2 SERPL-SCNC: 16 MMOL/L
CO2 SERPL-SCNC: 21 MMOL/L
CO2 SERPL-SCNC: 21 MMOL/L
CO2 SERPL-SCNC: 22 MMOL/L
COLOR UR: YELLOW
CREAT SERPL-MCNC: 1.1 MG/DL
CREAT SERPL-MCNC: 1.2 MG/DL
CREAT SERPL-MCNC: 1.4 MG/DL
CREAT SERPL-MCNC: 1.9 MG/DL
DELSYS: ABNORMAL
DIFFERENTIAL METHOD: ABNORMAL
EOSINOPHIL # BLD AUTO: 0 K/UL
EOSINOPHIL NFR BLD: 0 %
ERYTHROCYTE [DISTWIDTH] IN BLOOD BY AUTOMATED COUNT: 16.7 %
ERYTHROCYTE [DISTWIDTH] IN BLOOD BY AUTOMATED COUNT: 16.8 %
ERYTHROCYTE [DISTWIDTH] IN BLOOD BY AUTOMATED COUNT: 17.2 %
ERYTHROCYTE [DISTWIDTH] IN BLOOD BY AUTOMATED COUNT: 17.4 %
EST. GFR  (AFRICAN AMERICAN): 33 ML/MIN/1.73 M^2
EST. GFR  (AFRICAN AMERICAN): 47 ML/MIN/1.73 M^2
EST. GFR  (AFRICAN AMERICAN): 57 ML/MIN/1.73 M^2
EST. GFR  (AFRICAN AMERICAN): >60 ML/MIN/1.73 M^2
EST. GFR  (NON AFRICAN AMERICAN): 28 ML/MIN/1.73 M^2
EST. GFR  (NON AFRICAN AMERICAN): 41 ML/MIN/1.73 M^2
EST. GFR  (NON AFRICAN AMERICAN): 49 ML/MIN/1.73 M^2
EST. GFR  (NON AFRICAN AMERICAN): 55 ML/MIN/1.73 M^2
ESTIMATED AVG GLUCOSE: 143 MG/DL
FIO2: 36
FLOW: 4
GLUCOSE SERPL-MCNC: 126 MG/DL
GLUCOSE SERPL-MCNC: 142 MG/DL
GLUCOSE SERPL-MCNC: 191 MG/DL
GLUCOSE SERPL-MCNC: 97 MG/DL
GLUCOSE UR QL STRIP: NEGATIVE
GRAN CASTS #/AREA URNS LPF: 5 /LPF
HBA1C MFR BLD HPLC: 6.6 %
HCO3 UR-SCNC: 22.4 MMOL/L (ref 24–28)
HCT VFR BLD AUTO: 34.5 %
HCT VFR BLD AUTO: 35.5 %
HCT VFR BLD AUTO: 36 %
HCT VFR BLD AUTO: 39.9 %
HGB BLD-MCNC: 10.2 G/DL
HGB BLD-MCNC: 10.3 G/DL
HGB BLD-MCNC: 11.7 G/DL
HGB BLD-MCNC: 12.8 G/DL
HGB UR QL STRIP: ABNORMAL
KETONES UR QL STRIP: ABNORMAL
LEUKOCYTE ESTERASE UR QL STRIP: ABNORMAL
LYMPHOCYTES # BLD AUTO: 0.4 K/UL
LYMPHOCYTES # BLD AUTO: 0.5 K/UL
LYMPHOCYTES # BLD AUTO: 0.6 K/UL
LYMPHOCYTES # BLD AUTO: 0.7 K/UL
LYMPHOCYTES NFR BLD: 4.2 %
LYMPHOCYTES NFR BLD: 5.7 %
LYMPHOCYTES NFR BLD: 5.9 %
LYMPHOCYTES NFR BLD: 7.7 %
MAGNESIUM SERPL-MCNC: 2 MG/DL
MAGNESIUM SERPL-MCNC: 2.2 MG/DL
MAGNESIUM SERPL-MCNC: 2.3 MG/DL
MAGNESIUM SERPL-MCNC: 2.6 MG/DL
MCH RBC QN AUTO: 28.5 PG
MCH RBC QN AUTO: 28.7 PG
MCH RBC QN AUTO: 29 PG
MCH RBC QN AUTO: 29.1 PG
MCHC RBC AUTO-ENTMCNC: 28.6 G/DL
MCHC RBC AUTO-ENTMCNC: 29.6 G/DL
MCHC RBC AUTO-ENTMCNC: 32.1 G/DL
MCHC RBC AUTO-ENTMCNC: 33 G/DL
MCV RBC AUTO: 88 FL
MCV RBC AUTO: 91 FL
MCV RBC AUTO: 97 FL
MCV RBC AUTO: 99 FL
MICROSCOPIC COMMENT: ABNORMAL
MODE: ABNORMAL
MONOCYTES # BLD AUTO: 0.3 K/UL
MONOCYTES # BLD AUTO: 0.4 K/UL
MONOCYTES # BLD AUTO: 0.5 K/UL
MONOCYTES # BLD AUTO: 0.6 K/UL
MONOCYTES NFR BLD: 3.8 %
MONOCYTES NFR BLD: 3.9 %
MONOCYTES NFR BLD: 5.6 %
MONOCYTES NFR BLD: 5.8 %
NEUTROPHILS # BLD AUTO: 7.8 K/UL
NEUTROPHILS # BLD AUTO: 8.2 K/UL
NEUTROPHILS # BLD AUTO: 8.2 K/UL
NEUTROPHILS # BLD AUTO: 8.5 K/UL
NEUTROPHILS NFR BLD: 85.6 %
NEUTROPHILS NFR BLD: 88.2 %
NEUTROPHILS NFR BLD: 89.9 %
NEUTROPHILS NFR BLD: 91.3 %
NITRITE UR QL STRIP: NEGATIVE
PCO2 BLDA: 52 MMHG (ref 35–45)
PH SMN: 7.24 [PH] (ref 7.35–7.45)
PH UR STRIP: 5 [PH] (ref 5–8)
PHOSPHATE SERPL-MCNC: 2.9 MG/DL
PHOSPHATE SERPL-MCNC: 3.2 MG/DL
PHOSPHATE SERPL-MCNC: 5 MG/DL
PLATELET # BLD AUTO: 104 K/UL
PLATELET # BLD AUTO: 111 K/UL
PLATELET # BLD AUTO: 132 K/UL
PLATELET # BLD AUTO: 147 K/UL
PMV BLD AUTO: 10.4 FL
PMV BLD AUTO: 10.5 FL
PMV BLD AUTO: 11.3 FL
PMV BLD AUTO: 9.9 FL
PO2 BLDA: 78 MMHG (ref 80–100)
POC BE: -5 MMOL/L
POC SATURATED O2: 93 % (ref 95–100)
POCT GLUCOSE: 106 MG/DL (ref 70–110)
POCT GLUCOSE: 106 MG/DL (ref 70–110)
POCT GLUCOSE: 141 MG/DL (ref 70–110)
POCT GLUCOSE: 153 MG/DL (ref 70–110)
POCT GLUCOSE: 49 MG/DL (ref 70–110)
POCT GLUCOSE: 55 MG/DL (ref 70–110)
POCT GLUCOSE: 63 MG/DL (ref 70–110)
POCT GLUCOSE: 76 MG/DL (ref 70–110)
POCT GLUCOSE: 79 MG/DL (ref 70–110)
POCT GLUCOSE: 80 MG/DL (ref 70–110)
POCT GLUCOSE: 92 MG/DL (ref 70–110)
POTASSIUM SERPL-SCNC: 4.4 MMOL/L
POTASSIUM SERPL-SCNC: 4.6 MMOL/L
POTASSIUM SERPL-SCNC: 4.7 MMOL/L
POTASSIUM SERPL-SCNC: 5 MMOL/L
PROT SERPL-MCNC: 7.1 G/DL
PROT UR QL STRIP: ABNORMAL
RBC # BLD AUTO: 3.55 M/UL
RBC # BLD AUTO: 3.62 M/UL
RBC # BLD AUTO: 4.04 M/UL
RBC # BLD AUTO: 4.4 M/UL
RBC #/AREA URNS HPF: 3 /HPF (ref 0–4)
SAMPLE: ABNORMAL
SITE: ABNORMAL
SODIUM SERPL-SCNC: 144 MMOL/L
SODIUM SERPL-SCNC: 147 MMOL/L
SODIUM SERPL-SCNC: 147 MMOL/L
SODIUM SERPL-SCNC: 148 MMOL/L
SP GR UR STRIP: 1.02 (ref 1–1.03)
SP02: 95
URN SPEC COLLECT METH UR: ABNORMAL
UROBILINOGEN UR STRIP-ACNC: NEGATIVE EU/DL
WBC # BLD AUTO: 8.56 K/UL
WBC # BLD AUTO: 9.14 K/UL
WBC # BLD AUTO: 9.54 K/UL
WBC # BLD AUTO: 9.65 K/UL
WBC #/AREA URNS HPF: 5 /HPF (ref 0–5)

## 2018-01-01 PROCEDURE — 85025 COMPLETE CBC W/AUTO DIFF WBC: CPT

## 2018-01-01 PROCEDURE — 99900035 HC TECH TIME PER 15 MIN (STAT)

## 2018-01-01 PROCEDURE — 11000001 HC ACUTE MED/SURG PRIVATE ROOM

## 2018-01-01 PROCEDURE — 77412 RADIATION TX DELIVERY LVL 3: CPT | Performed by: RADIOLOGY

## 2018-01-01 PROCEDURE — 83735 ASSAY OF MAGNESIUM: CPT

## 2018-01-01 PROCEDURE — 94761 N-INVAS EAR/PLS OXIMETRY MLT: CPT

## 2018-01-01 PROCEDURE — 92526 ORAL FUNCTION THERAPY: CPT

## 2018-01-01 PROCEDURE — 02HV33Z INSERTION OF INFUSION DEVICE INTO SUPERIOR VENA CAVA, PERCUTANEOUS APPROACH: ICD-10-PCS | Performed by: EMERGENCY MEDICINE

## 2018-01-01 PROCEDURE — 25000003 PHARM REV CODE 250: Performed by: HOSPITALIST

## 2018-01-01 PROCEDURE — 80048 BASIC METABOLIC PNL TOTAL CA: CPT

## 2018-01-01 PROCEDURE — 92610 EVALUATE SWALLOWING FUNCTION: CPT

## 2018-01-01 PROCEDURE — 80053 COMPREHEN METABOLIC PANEL: CPT

## 2018-01-01 PROCEDURE — 63600175 PHARM REV CODE 636 W HCPCS: Performed by: PHYSICIAN ASSISTANT

## 2018-01-01 PROCEDURE — 81000 URINALYSIS NONAUTO W/SCOPE: CPT

## 2018-01-01 PROCEDURE — 99223 1ST HOSP IP/OBS HIGH 75: CPT | Mod: AI,,, | Performed by: PHYSICIAN ASSISTANT

## 2018-01-01 PROCEDURE — 84100 ASSAY OF PHOSPHORUS: CPT

## 2018-01-01 PROCEDURE — 27000221 HC OXYGEN, UP TO 24 HOURS

## 2018-01-01 PROCEDURE — 96361 HYDRATE IV INFUSION ADD-ON: CPT

## 2018-01-01 PROCEDURE — 94640 AIRWAY INHALATION TREATMENT: CPT

## 2018-01-01 PROCEDURE — 36600 WITHDRAWAL OF ARTERIAL BLOOD: CPT

## 2018-01-01 PROCEDURE — 96360 HYDRATION IV INFUSION INIT: CPT

## 2018-01-01 PROCEDURE — 63600175 PHARM REV CODE 636 W HCPCS: Performed by: INTERNAL MEDICINE

## 2018-01-01 PROCEDURE — 99233 SBSQ HOSP IP/OBS HIGH 50: CPT | Mod: ,,, | Performed by: INTERNAL MEDICINE

## 2018-01-01 PROCEDURE — 99233 SBSQ HOSP IP/OBS HIGH 50: CPT | Mod: ,,, | Performed by: HOSPITALIST

## 2018-01-01 PROCEDURE — S5010 5% DEXTROSE AND 0.45% SALINE: HCPCS | Performed by: HOSPITALIST

## 2018-01-01 PROCEDURE — 83036 HEMOGLOBIN GLYCOSYLATED A1C: CPT

## 2018-01-01 PROCEDURE — 25000003 PHARM REV CODE 250: Performed by: PHYSICIAN ASSISTANT

## 2018-01-01 PROCEDURE — 99238 HOSP IP/OBS DSCHRG MGMT 30/<: CPT | Mod: ,,, | Performed by: HOSPITALIST

## 2018-01-01 PROCEDURE — 25000242 PHARM REV CODE 250 ALT 637 W/ HCPCS: Performed by: PHYSICIAN ASSISTANT

## 2018-01-01 PROCEDURE — 36415 COLL VENOUS BLD VENIPUNCTURE: CPT

## 2018-01-01 PROCEDURE — 36556 INSERT NON-TUNNEL CV CATH: CPT

## 2018-01-01 PROCEDURE — 82803 BLOOD GASES ANY COMBINATION: CPT

## 2018-01-01 PROCEDURE — 77336 RADIATION PHYSICS CONSULT: CPT | Performed by: RADIOLOGY

## 2018-01-01 PROCEDURE — 25000003 PHARM REV CODE 250: Performed by: EMERGENCY MEDICINE

## 2018-01-01 PROCEDURE — 99285 EMERGENCY DEPT VISIT HI MDM: CPT | Mod: 25

## 2018-01-01 RX ORDER — MORPHINE SULFATE 2 MG/ML
1 INJECTION, SOLUTION INTRAMUSCULAR; INTRAVENOUS EVERY 4 HOURS PRN
Status: DISCONTINUED | OUTPATIENT
Start: 2018-01-01 | End: 2018-01-01 | Stop reason: SDUPTHER

## 2018-01-01 RX ORDER — MORPHINE SULFATE 2 MG/ML
1 INJECTION, SOLUTION INTRAMUSCULAR; INTRAVENOUS EVERY 4 HOURS PRN
Status: DISCONTINUED | OUTPATIENT
Start: 2018-01-01 | End: 2018-01-01

## 2018-01-01 RX ORDER — VALSARTAN 160 MG/1
160 TABLET ORAL DAILY
Qty: 30 TABLET | Refills: 1 | OUTPATIENT
Start: 2018-01-01 | End: 2019-01-26

## 2018-01-01 RX ORDER — SODIUM CHLORIDE 0.9 % (FLUSH) 0.9 %
5 SYRINGE (ML) INJECTION
Status: DISCONTINUED | OUTPATIENT
Start: 2018-01-01 | End: 2018-01-01 | Stop reason: HOSPADM

## 2018-01-01 RX ORDER — PREDNISONE 2.5 MG/1
2.5 TABLET ORAL DAILY
Status: ON HOLD | COMMUNITY
End: 2018-01-01 | Stop reason: HOSPADM

## 2018-01-01 RX ORDER — OXYCODONE AND ACETAMINOPHEN 5; 325 MG/1; MG/1
1 TABLET ORAL EVERY 6 HOURS PRN
Qty: 30 TABLET | Refills: 0 | Status: ON HOLD | OUTPATIENT
Start: 2018-01-01 | End: 2018-01-01 | Stop reason: HOSPADM

## 2018-01-01 RX ORDER — ACETAMINOPHEN 325 MG/1
650 TABLET ORAL EVERY 4 HOURS PRN
Status: DISCONTINUED | OUTPATIENT
Start: 2018-01-01 | End: 2018-01-01 | Stop reason: HOSPADM

## 2018-01-01 RX ORDER — GLUCAGON 1 MG
1 KIT INJECTION
Status: DISCONTINUED | OUTPATIENT
Start: 2018-01-01 | End: 2018-01-01 | Stop reason: HOSPADM

## 2018-01-01 RX ORDER — DEXTROSE MONOHYDRATE AND SODIUM CHLORIDE 5; .9 G/100ML; G/100ML
INJECTION, SOLUTION INTRAVENOUS CONTINUOUS
Status: DISCONTINUED | OUTPATIENT
Start: 2018-01-01 | End: 2018-01-01

## 2018-01-01 RX ORDER — IBUPROFEN 200 MG
24 TABLET ORAL
Status: DISCONTINUED | OUTPATIENT
Start: 2018-01-01 | End: 2018-01-01 | Stop reason: HOSPADM

## 2018-01-01 RX ORDER — HYDROMORPHONE HYDROCHLORIDE 1 MG/ML
0.2 INJECTION, SOLUTION INTRAMUSCULAR; INTRAVENOUS; SUBCUTANEOUS ONCE
Status: COMPLETED | OUTPATIENT
Start: 2018-01-01 | End: 2018-01-01

## 2018-01-01 RX ORDER — IBUPROFEN 200 MG
16 TABLET ORAL
Status: DISCONTINUED | OUTPATIENT
Start: 2018-01-01 | End: 2018-01-01 | Stop reason: HOSPADM

## 2018-01-01 RX ORDER — ACETAMINOPHEN 325 MG/1
650 TABLET ORAL EVERY 8 HOURS PRN
Status: DISCONTINUED | OUTPATIENT
Start: 2018-01-01 | End: 2018-01-01 | Stop reason: SDUPTHER

## 2018-01-01 RX ORDER — SODIUM CHLORIDE 9 MG/ML
INJECTION, SOLUTION INTRAVENOUS CONTINUOUS
Status: DISCONTINUED | OUTPATIENT
Start: 2018-01-01 | End: 2018-01-01

## 2018-01-01 RX ORDER — DEXTROSE MONOHYDRATE AND SODIUM CHLORIDE 5; .45 G/100ML; G/100ML
INJECTION, SOLUTION INTRAVENOUS CONTINUOUS
Status: DISCONTINUED | OUTPATIENT
Start: 2018-01-01 | End: 2018-01-01 | Stop reason: HOSPADM

## 2018-01-01 RX ORDER — MORPHINE SULFATE 4 MG/ML
1 INJECTION, SOLUTION INTRAMUSCULAR; INTRAVENOUS EVERY 4 HOURS PRN
Status: DISCONTINUED | OUTPATIENT
Start: 2018-01-01 | End: 2018-01-01

## 2018-01-01 RX ORDER — VALSARTAN 160 MG/1
160 TABLET ORAL DAILY
Qty: 30 TABLET | Refills: 1 | OUTPATIENT
Start: 2018-01-01 | End: 2019-01-25

## 2018-01-01 RX ORDER — INSULIN ASPART 100 [IU]/ML
0-5 INJECTION, SOLUTION INTRAVENOUS; SUBCUTANEOUS
Status: DISCONTINUED | OUTPATIENT
Start: 2018-01-01 | End: 2018-01-01 | Stop reason: HOSPADM

## 2018-01-01 RX ORDER — MORPHINE SULFATE 2 MG/ML
2 INJECTION, SOLUTION INTRAMUSCULAR; INTRAVENOUS
Status: DISCONTINUED | OUTPATIENT
Start: 2018-01-01 | End: 2018-01-01

## 2018-01-01 RX ORDER — HEPARIN SODIUM 5000 [USP'U]/ML
5000 INJECTION, SOLUTION INTRAVENOUS; SUBCUTANEOUS EVERY 8 HOURS
Status: DISCONTINUED | OUTPATIENT
Start: 2018-01-01 | End: 2018-01-01 | Stop reason: HOSPADM

## 2018-01-01 RX ORDER — PREDNISONE 2.5 MG/1
2.5 TABLET ORAL DAILY
Qty: 10 TABLET | Refills: 0 | Status: SHIPPED | OUTPATIENT
Start: 2018-01-01 | End: 2018-01-01

## 2018-01-01 RX ORDER — ONDANSETRON 8 MG/1
8 TABLET, ORALLY DISINTEGRATING ORAL EVERY 8 HOURS PRN
Status: DISCONTINUED | OUTPATIENT
Start: 2018-01-01 | End: 2018-01-01 | Stop reason: SDUPTHER

## 2018-01-01 RX ORDER — MORPHINE SULFATE 4 MG/ML
2 INJECTION, SOLUTION INTRAMUSCULAR; INTRAVENOUS
Status: DISCONTINUED | OUTPATIENT
Start: 2018-01-01 | End: 2018-01-01 | Stop reason: HOSPADM

## 2018-01-01 RX ORDER — ALBUTEROL SULFATE 0.83 MG/ML
0.63 SOLUTION RESPIRATORY (INHALATION) EVERY 6 HOURS PRN
Status: DISCONTINUED | OUTPATIENT
Start: 2018-01-01 | End: 2018-01-01 | Stop reason: HOSPADM

## 2018-01-01 RX ORDER — MORPHINE SULFATE 4 MG/ML
2 INJECTION, SOLUTION INTRAMUSCULAR; INTRAVENOUS EVERY 4 HOURS PRN
Status: DISCONTINUED | OUTPATIENT
Start: 2018-01-01 | End: 2018-01-01

## 2018-01-01 RX ORDER — RAMELTEON 8 MG/1
8 TABLET ORAL NIGHTLY PRN
Status: DISCONTINUED | OUTPATIENT
Start: 2018-01-01 | End: 2018-01-01 | Stop reason: HOSPADM

## 2018-01-01 RX ORDER — ONDANSETRON 8 MG/1
8 TABLET, ORALLY DISINTEGRATING ORAL EVERY 8 HOURS PRN
Status: DISCONTINUED | OUTPATIENT
Start: 2018-01-01 | End: 2018-01-01 | Stop reason: HOSPADM

## 2018-01-01 RX ORDER — OXYCODONE AND ACETAMINOPHEN 5; 325 MG/1; MG/1
1 TABLET ORAL EVERY 6 HOURS PRN
Qty: 30 TABLET | Refills: 0 | Status: SHIPPED | OUTPATIENT
Start: 2018-01-01 | End: 2018-01-01 | Stop reason: SDUPTHER

## 2018-01-01 RX ORDER — VALSARTAN 80 MG/1
160 TABLET ORAL DAILY
Status: DISCONTINUED | OUTPATIENT
Start: 2018-01-01 | End: 2018-01-01

## 2018-01-01 RX ADMIN — DEXTROSE AND SODIUM CHLORIDE: 5; .45 INJECTION, SOLUTION INTRAVENOUS at 08:02

## 2018-01-01 RX ADMIN — HEPARIN SODIUM 5000 UNITS: 5000 INJECTION, SOLUTION INTRAVENOUS; SUBCUTANEOUS at 05:02

## 2018-01-01 RX ADMIN — VALSARTAN 160 MG: 80 TABLET ORAL at 09:02

## 2018-01-01 RX ADMIN — MORPHINE SULFATE 1 MG: 4 INJECTION, SOLUTION INTRAMUSCULAR; INTRAVENOUS at 12:02

## 2018-01-01 RX ADMIN — HEPARIN SODIUM 5000 UNITS: 5000 INJECTION, SOLUTION INTRAVENOUS; SUBCUTANEOUS at 09:02

## 2018-01-01 RX ADMIN — MORPHINE SULFATE 1 MG: 4 INJECTION, SOLUTION INTRAMUSCULAR; INTRAVENOUS at 02:02

## 2018-01-01 RX ADMIN — HEPARIN SODIUM 5000 UNITS: 5000 INJECTION, SOLUTION INTRAVENOUS; SUBCUTANEOUS at 03:02

## 2018-01-01 RX ADMIN — ACETAMINOPHEN 650 MG: 325 TABLET ORAL at 11:02

## 2018-01-01 RX ADMIN — MORPHINE SULFATE 2 MG: 4 INJECTION, SOLUTION INTRAMUSCULAR; INTRAVENOUS at 12:02

## 2018-01-01 RX ADMIN — HEPARIN SODIUM 5000 UNITS: 5000 INJECTION, SOLUTION INTRAVENOUS; SUBCUTANEOUS at 12:02

## 2018-01-01 RX ADMIN — DEXTROSE MONOHYDRATE 25 G: 25 INJECTION, SOLUTION INTRAVENOUS at 10:02

## 2018-01-01 RX ADMIN — SODIUM CHLORIDE 1000 ML: 0.9 INJECTION, SOLUTION INTRAVENOUS at 07:02

## 2018-01-01 RX ADMIN — SODIUM CHLORIDE: 0.9 INJECTION, SOLUTION INTRAVENOUS at 12:02

## 2018-01-01 RX ADMIN — ALBUTEROL SULFATE 0.67 MG: 2.5 SOLUTION RESPIRATORY (INHALATION) at 11:02

## 2018-01-01 RX ADMIN — HYDROMORPHONE HYDROCHLORIDE 0.2 MG: 1 INJECTION, SOLUTION INTRAMUSCULAR; INTRAVENOUS; SUBCUTANEOUS at 03:02

## 2018-01-01 RX ADMIN — MORPHINE SULFATE 2 MG: 4 INJECTION, SOLUTION INTRAMUSCULAR; INTRAVENOUS at 08:02

## 2018-01-01 RX ADMIN — MORPHINE SULFATE 2 MG: 4 INJECTION, SOLUTION INTRAMUSCULAR; INTRAVENOUS at 04:02

## 2018-01-01 RX ADMIN — SODIUM CHLORIDE: 0.9 INJECTION, SOLUTION INTRAVENOUS at 04:02

## 2018-01-01 RX ADMIN — MORPHINE SULFATE 2 MG: 4 INJECTION, SOLUTION INTRAMUSCULAR; INTRAVENOUS at 03:02

## 2018-01-01 RX ADMIN — HEPARIN SODIUM 5000 UNITS: 5000 INJECTION, SOLUTION INTRAVENOUS; SUBCUTANEOUS at 07:02

## 2018-01-01 RX ADMIN — HEPARIN SODIUM 5000 UNITS: 5000 INJECTION, SOLUTION INTRAVENOUS; SUBCUTANEOUS at 02:02

## 2018-01-04 NOTE — PROGRESS NOTES
Received call from radiation that patient is in need of transport again today and tomorrow. Faxed request in to [employer]. Received call from Javier to provide quote. It will be $20 for today and $20 for tomorrow for a total of $40.00 The previous cabs totaled $60 from radiation assistance.

## 2018-01-05 NOTE — PLAN OF CARE
Problem: Patient Care Overview  Goal: Plan of Care Review  Outcome: Ongoing (interventions implemented as appropriate)  Radiation completed to whole brain on 1/5/18  Prednisone decreased to 2.5 mg daily f/u appt made

## 2018-01-05 NOTE — PROGRESS NOTES
Received a call from Yumi with [facility]( [phone]) in regards for the transportation that was set up for the patient today. She stated that she spoke with the patient this am to confirm the pickup time and the patient was in agreement and confirmed. The  arrived at there home and could not reach the patient. Yumi called the patient again and she did answer the phone. At that point the patient told her that she was already at the hospital because she had found a ride. Patient did not call to cancel her trip after locating alternative transportation. I then canceled her return trip for today.

## 2018-02-11 PROBLEM — R53.83 FATIGUE: Status: ACTIVE | Noted: 2018-01-01

## 2018-02-11 PROBLEM — R13.10 DYSPHAGIA: Status: ACTIVE | Noted: 2018-01-01

## 2018-02-11 PROBLEM — R62.7 ADULT FAILURE TO THRIVE: Status: ACTIVE | Noted: 2018-01-01

## 2018-02-11 NOTE — ED PROVIDER NOTES
"Encounter Date: 2/11/2018    SCRIBE #1 NOTE: I, Mai Lima, am scribing for, and in the presence of, Dr. Joseph.       History     Chief Complaint   Patient presents with    Difficulty Swallowing     pt complaining of difficulty swallowing onset this am.  denies any SOB states feels like something is in her throat  patient has lung ca     Time seen by provider: 5:28 PM    This is a 62 y.o. female, with history of HTN, DM, COPD, and lung cancer, who presents with complaint of difficulty swallowing that began this morning. She notes sudden onset. She describes feeling like "something is coming back up" when swallowing, and is unable to tolerate PO intake. She reports fatigue, loss of appetite, nausea, vomiting, and weakness. She denies fever, facial swelling, oral lesions, SOB, or speech difficulty.      The history is provided by the patient.     Review of patient's allergies indicates:  No Known Allergies  Past Medical History:   Diagnosis Date    Cancer     COPD (chronic obstructive pulmonary disease)     noted in MD h/p pt not aware of this diagnosis    Diabetes mellitus     Hypertension     On home oxygen therapy     since jan 2017     History reviewed. No pertinent surgical history.  History reviewed. No pertinent family history.  Social History   Substance Use Topics    Smoking status: Former Smoker     Years: 40.00     Types: Cigarettes    Smokeless tobacco: Never Used    Alcohol use No     Review of Systems   Constitutional: Positive for appetite change and fatigue. Negative for fever.   HENT: Positive for trouble swallowing. Negative for facial swelling, mouth sores and sore throat.    Respiratory: Negative for shortness of breath.    Cardiovascular: Negative for chest pain.   Gastrointestinal: Positive for nausea and vomiting. Negative for abdominal pain.   Genitourinary: Negative for dysuria.   Musculoskeletal: Negative for back pain.   Skin: Negative for rash.   Neurological: Positive for " "weakness. Negative for speech difficulty.   Hematological: Does not bruise/bleed easily.       Physical Exam     Initial Vitals [02/11/18 1704]   BP Pulse Resp Temp SpO2   (!) 133/42 (!) 117 19 97.7 °F (36.5 °C) 99 %      MAP       72.33         Physical Exam    Nursing note and vitals reviewed.  Constitutional: She is not diaphoretic. She appears cachectic. She appears distressed.   HENT:   Head: Atraumatic.   Right Ear: External ear normal.   Left Ear: External ear normal.   Severe temporal wasting. Dry, cracked lips and tongue. Tolerating secretions.   Eyes: EOM are normal. Pupils are equal, round, and reactive to light. Right eye exhibits no discharge. Left eye exhibits no discharge.   Neck: No tracheal deviation present. No JVD present.   Cardiovascular: Regular rhythm and normal heart sounds. Tachycardia present.  Exam reveals no gallop and no friction rub.    No murmur heard.  Pulmonary/Chest: Breath sounds normal. No stridor. Tachypnea (mild) noted. No respiratory distress. She has no wheezes. She has no rhonchi. She has no rales.   Abdominal: Soft.   scaphoid   Musculoskeletal: She exhibits no edema.   Diffuse atrophy.   Neurological: She is alert. She has normal strength.   Skin: No rash noted. No pallor.         ED Course   Central Line  Date/Time: 2/11/2018 7:21 PM  Performed by: MARCIO BRASHER  Consent Done: Emergent Situation  Time out: Immediately prior to procedure a "time out" was called to verify the correct patient, procedure, equipment, support staff and site/side marked as required.  Indications: vascular access  Anesthesia: local infiltration    Anesthesia:  Local Anesthetic: lidocaine 1% with epinephrine  Preparation: skin prepped with ChloraPrep  Skin prep agent dried: skin prep agent completely dried prior to procedure  Sterile barriers: all five maximum sterile barriers used - cap, mask, sterile gown, sterile gloves, and large sterile sheet  Hand hygiene: hand hygiene performed prior to " central venous catheter insertion  Location details: right internal jugular  Catheter type: triple lumen  Catheter Length: 15cm    Ultrasound guidance: yes  Vessel Caliber: medium, compressibility normal  Needle advanced into vessel with real time Ultrasound guidance.  Sterile sheath used.  Number of attempts: 1  Specimens: No  Implants: No  Post-procedure: line sutured,  chlorhexidine patch,  sterile dressing applied and blood return through all ports  Complications: No        Labs Reviewed   CBC W/ AUTO DIFFERENTIAL - Abnormal; Notable for the following:        Result Value    RDW 16.7 (*)     Platelets 147 (*)     Gran # (ANC) 8.2 (*)     Lymph # 0.5 (*)     Gran% 89.9 (*)     Lymph% 5.9 (*)     Mono% 3.8 (*)     All other components within normal limits   COMPREHENSIVE METABOLIC PANEL - Abnormal; Notable for the following:     CO2 16 (*)     Glucose 126 (*)     BUN, Bld 71 (*)     Creatinine 1.9 (*)     Albumin 3.4 (*)     Anion Gap 21 (*)     eGFR if  33 (*)     eGFR if non  28 (*)     All other components within normal limits   MAGNESIUM     Imaging Results          X-Ray Chest 1 View (Final result)  Result time 02/11/18 20:34:32    Final result by Eleil Mora MD (02/11/18 20:34:32)                 Impression:        Interval increase in size of the cardiomediastinal silhouette, which may be exaggerated by magnification or related to pericardial effusion. Clinical correlation is recommended.    Interval enlargement of left suprahilar mass and/or worsening postobstructive atelectasis/pneumonia.    Severe bilateral emphysematous changes.    Linear structure overlies the right breast and may be external to the patient, recommend correlation with direct visualization.      Electronically signed by: Eliel Mora  Date:     02/11/18  Time:    20:34              Narrative:    COMPARISON: 11/25/2017.    FINDINGS: One view of the chest was obtained.  Interval enlargement of the  cardiomediastinal silhouette. Interval enlargement of a large opacity in the left suprahilar region consistent with mass lesion identified on recent CT.  Severe bilateral emphysematous changes with coarsened interstitial lung markings. Right-sided central venous catheter terminates overlying the SVC. Unchanged left-sided PICC terminating over the lower SVC. Linear structure overlies the right breast, possibly external to the patient.                                    Medical Decision Making:   Initial Assessment:   Urgent evaluation of 62-year-old female with known extensive lung cancer, with multiple metastases brought in by EMS given complaint of difficulty swallowing.  Patient is overtly dehydrated, and cachectic, with extreme temporal wasting, and atrophy, and endorses decreased appetite, with inability to swallow today.  However upon discussion with patient's son, patient was a, patient has had decreased appetite for 3 weeks.  I discussed the severe state of her condition, progressive disease and poor prognosis and suggested discussion of hospice given pt endorsed desire to be home and comfortable.  I suspect severe dehydration with metabolic disturbances, as patient appears chronically ill, and wasted.  There is possibility for esophagitis or esophageal strictures ROS, the patient tolerating secretions at this time.  Multiple times by me and nursing for peripheral access, and was unsuccessful given severe dehydration.  Decision was made to place a central line for vascular access. In agreement with pt and son, will plan for likely admission with desire to establish home hospice.   Clinical Tests:   Lab Tests: Ordered and Reviewed  Radiological Study: Ordered and Reviewed  ED Management:  Labs without overt abnormalities, though clinically very ill appearing- dehydrated requiring IVF and admission. DNR was signed by patient herself in alignment with discussion with son.            Scribe Attestation:   Jayden  #1: I performed the above scribed service and the documentation accurately describes the services I performed. I attest to the accuracy of the note.    Attending Attestation:           Physician Attestation for Scribe:  Physician Attestation Statement for Scribe #1: I, Dr. Joseph, reviewed documentation, as scribed by Mai Lima in my presence, and it is both accurate and complete.                 ED Course      Clinical Impression:     1. Fatigue, unspecified type    2. Weakness    3. Shortness of breath    4. Malignancy    5. FRANCA (acute kidney injury)          Disposition:   Disposition: Admitted  Condition: Serious                        Rita Joseph MD  02/14/18 9610

## 2018-02-12 PROBLEM — L89.93: Status: ACTIVE | Noted: 2018-01-01

## 2018-02-12 NOTE — ED NOTES
Pt with 2 pressure ulcers.  Pt with approx. 2 cm by 1 cm wound to sacrum, wound bed is pink and moist. There is partial thickness with loss of dermis noted. Pt also with 3 cm by 3 cm wound to R buttocks, wound bed is pink and moist. There is partial thickness with loss of dermis.

## 2018-02-12 NOTE — PLAN OF CARE
Problem: SLP Goal  Goal: SLP Goal  1. Pt will be able to consume a pureed diet with thin liquids with no signs of airway threat or aspiration given mod cues to follow through on compensatory strategies: small bites and sips, multiple swallows.  2. Pt will be able to advance to a mechanical soft diet with thin liquids with no signs of airway threat or aspiration given mod cues to follow through on compensatory strategies: small bites and sips, multiple swallows.    Outcome: Ongoing (interventions implemented as appropriate)  Bedside swallow eval completed. See report for details    Comments: Speech to follow for remediation of dysphagia 5-6x/week

## 2018-02-12 NOTE — ED NOTES
Pt given some water to drink.  Pt able to take two gulps of water through the straw.  Pt able to swallow.  No coughing, choking observed after swallowing.  Pt denies pain

## 2018-02-12 NOTE — ASSESSMENT & PLAN NOTE
- no apparent exacerbation  - continue home meds  - on 2L O2 at home per Oncology notes    - moonitor

## 2018-02-12 NOTE — PLAN OF CARE
Problem: Patient Care Overview  Goal: Plan of Care Review  Outcome: Ongoing (interventions implemented as appropriate)  Patient had an uneventful night, no falls, skin breakdown, etc. NPO. IV fluids given. Glucose monitoring. Purposely hourly rounding done throughout shift. VSS. Helped patient to bedpan x 2. Patient able to turn self.

## 2018-02-12 NOTE — ASSESSMENT & PLAN NOTE
- follow with Dr. Oliva  Per Oncology Notes:  - received 6 cycles of cisplatin/etoposide chemotherapy, which was completed in June 2017   - then lost to follow up until presented to the ED on 11/25/17 with complaints of left face, arm and leg numbness and possibly some arm and leg weakness   - MRI brain 11/26/17 showed an enhancing lesion within the right nehemias and extending into the right middle cerebellar peduncle & a second enhancing lesion within the right lateral cerebellum    - CT C/A/P showed progression of both the posterior OMAIRA mass and the anterior mediastinal mass, encasing the L PA   - Evaluated by neurosurgery for Gamma Knife radiosurgery & Rad onc consult for whole brain RT was recommended  - On prednisone 10mg daily and Percocet 5s, 1 tab po q6hrs prn for headache control

## 2018-02-12 NOTE — SUBJECTIVE & OBJECTIVE
Past Medical History:   Diagnosis Date    Cancer     COPD (chronic obstructive pulmonary disease)     noted in MD h/p pt not aware of this diagnosis    Diabetes mellitus     Hypertension     On home oxygen therapy     since jan 2017       History reviewed. No pertinent surgical history.    Review of patient's allergies indicates:  No Known Allergies    No current facility-administered medications on file prior to encounter.      Current Outpatient Prescriptions on File Prior to Encounter   Medication Sig    acetaminophen (TYLENOL) 500 MG tablet Take 500 mg by mouth nightly as needed for Pain.    albuterol (ACCUNEB) 0.63 mg/3 mL Nebu Take 3 mLs (0.63 mg total) by nebulization every 6 (six) hours as needed. Rescue    metFORMIN (GLUCOPHAGE) 500 MG tablet Take 500 mg by mouth every evening.    oxyCODONE-acetaminophen (PERCOCET) 5-325 mg per tablet Take 1 tablet by mouth every 6 (six) hours as needed for Pain.    valsartan (DIOVAN) 160 MG tablet Take 1 tablet (160 mg total) by mouth once daily.     Family History     None        Social History Main Topics    Smoking status: Former Smoker     Years: 40.00     Types: Cigarettes    Smokeless tobacco: Never Used    Alcohol use No    Drug use: No    Sexual activity: Not on file     Review of Systems   Constitutional: Positive for appetite change, fatigue and unexpected weight change (weight loss). Negative for chills, diaphoresis and fever.   HENT: Positive for trouble swallowing. Negative for congestion, ear pain, facial swelling, postnasal drip, rhinorrhea and sore throat.    Respiratory: Negative for cough, shortness of breath and wheezing.    Cardiovascular: Negative for chest pain and palpitations.   Gastrointestinal: Negative for abdominal pain, diarrhea, nausea and vomiting.   Genitourinary: Negative for dysuria, flank pain, frequency, hematuria and urgency.   Skin: Negative for color change and pallor.   Neurological: Positive for weakness. Negative for  dizziness, syncope, speech difficulty, light-headedness, numbness and headaches.     Objective:     Vital Signs (Most Recent):  Temp: 97.5 °F (36.4 °C) (02/12/18 0100)  Pulse: 106 (02/11/18 2154)  Resp: (!) 24 (02/12/18 0100)  BP: 100/69 (02/12/18 0100)  SpO2: (!) 94 % (02/11/18 2055) Vital Signs (24h Range):  Temp:  [97.4 °F (36.3 °C)-97.7 °F (36.5 °C)] 97.5 °F (36.4 °C)  Pulse:  [104-117] 106  Resp:  [19-24] 24  SpO2:  [94 %-99 %] 94 %  BP: (100-133)/(42-88) 100/69     Weight: 30.6 kg (67 lb 7.4 oz)  Body mass index is 11.58 kg/m².    Physical Exam   Constitutional: She is oriented to person, place, and time. She appears well-developed and well-nourished. No distress.   Cachetic female.    HENT:   Head: Normocephalic and atraumatic.   Eyes: Conjunctivae and EOM are normal. Pupils are equal, round, and reactive to light. No scleral icterus.   Neck: Normal range of motion. Neck supple. No JVD present. No tracheal deviation present.   Cardiovascular: Normal rate, regular rhythm, normal heart sounds and intact distal pulses.  Exam reveals no gallop and no friction rub.    No murmur heard.  Pulmonary/Chest: Effort normal and breath sounds normal. No stridor. No respiratory distress. She has no wheezes. She has no rales.   Abdominal: Soft. Bowel sounds are normal. She exhibits no distension. There is no tenderness. There is no guarding.   Neurological: She is alert and oriented to person, place, and time.   Skin: Skin is warm and dry. She is not diaphoretic.   Skin dry and flaking.    Psychiatric: She has a normal mood and affect. Her behavior is normal. Judgment and thought content normal.   Nursing note and vitals reviewed.        CRANIAL NERVES     CN III, IV, VI   Pupils are equal, round, and reactive to light.  Extraocular motions are normal.        Significant Labs:   BMP:   Recent Labs  Lab 02/11/18  1758   *      K 5.0      CO2 16*   BUN 71*   CREATININE 1.9*   CALCIUM 10.0   MG 2.6     CBC:    Recent Labs  Lab 02/11/18  1758   WBC 9.14   HGB 12.8   HCT 39.9   *     CMP:   Recent Labs  Lab 02/11/18  1758      K 5.0      CO2 16*   *   BUN 71*   CREATININE 1.9*   CALCIUM 10.0   PROT 7.1   ALBUMIN 3.4*   BILITOT 0.7   ALKPHOS 59   AST 31   ALT 17   ANIONGAP 21*   EGFRNONAA 28*     All pertinent labs within the past 24 hours have been reviewed.    Significant Imaging: I have reviewed all pertinent imaging results/findings within the past 24 hours.   Imaging Results          X-Ray Chest 1 View (Final result)  Result time 02/11/18 20:34:32    Final result by Eliel Mora MD (02/11/18 20:34:32)                 Impression:        Interval increase in size of the cardiomediastinal silhouette, which may be exaggerated by magnification or related to pericardial effusion. Clinical correlation is recommended.    Interval enlargement of left suprahilar mass and/or worsening postobstructive atelectasis/pneumonia.    Severe bilateral emphysematous changes.    Linear structure overlies the right breast and may be external to the patient, recommend correlation with direct visualization.      Electronically signed by: Eliel Mora  Date:     02/11/18  Time:    20:34              Narrative:    COMPARISON: 11/25/2017.    FINDINGS: One view of the chest was obtained.  Interval enlargement of the cardiomediastinal silhouette. Interval enlargement of a large opacity in the left suprahilar region consistent with mass lesion identified on recent CT.  Severe bilateral emphysematous changes with coarsened interstitial lung markings. Right-sided central venous catheter terminates overlying the SVC. Unchanged left-sided PICC terminating over the lower SVC. Linear structure overlies the right breast, possibly external to the patient.

## 2018-02-12 NOTE — PLAN OF CARE
Problem: Patient Care Overview  Goal: Plan of Care Review  Outcome: Ongoing (interventions implemented as appropriate)  Recommendations     Recommendation/Intervention: 1. Continue Puree diet per SLP Rx    2.  Add Boost Glucose Control x3/day    3.  Assist patient with all meals and encourage increased intake    4.  Honor food requests/preferences within diet restriction     5.  RD to follow  and available for Rx if alternate form of nutrition desired.   Goals: Pt to tolerate Diet Rx/supplement   Nutrition Goal Status: new  Communication of RD Recs: reviewed with RN (Reviewed with patient )       Assessment and Plan     P:  Inadequate nutrient intake   E:  Related to diagnosis  S:  As evidenced by patient visitation, RD assessment; pt medical history, NSG documentation  Status:  New     P:  Increased Protein needs  E:  Related to stage 3 coccyx wound  S:  As evidenced by NSG documentation  Status; New

## 2018-02-12 NOTE — ED NOTES
Attempted IV access x3 by nurse. Dr. Joseph at . Dr. Joseph attempted IV access x4 to L EJ. MD attempted deep vein ultrasound guided IV access to R AC. Labs collected from R AC by Dr. Joseph. IV discontinued by MD r/t arterial access. Pressure dressing applied by nurse.

## 2018-02-12 NOTE — PLAN OF CARE
"Met with patient at bedside to complete discharge planning assessment. Patient lives at home with her 2 sons who provide the daily assist she needs with ADLs. Spoke with patient briefly regarding hospice services - patient states "well I hadn't seen the doctor yet to know how i'm doing but i'm not opposed to it as long as I can go home." Attempted to speak with son Alec - meir left. Will follow up      02/12/18 0843   Discharge Assessment   Assessment Type Discharge Planning Assessment   Confirmed/corrected address and phone number on facesheet? Yes   Assessment information obtained from? Patient   Communicated expected length of stay with patient/caregiver no   Prior to hospitilization cognitive status: Alert/Oriented   Prior to hospitalization functional status: Partially Dependent   Current cognitive status: Alert/Oriented   Current Functional Status: Partially Dependent   Lives With child(kevin), adult   Able to Return to Prior Arrangements yes   Is patient able to care for self after discharge? No   Who are your caregiver(s) and their phone number(s)? Alec Pop [phone]   Patient's perception of discharge disposition hospice/home   Readmission Within The Last 30 Days no previous admission in last 30 days   Patient currently being followed by outpatient case management? No   Patient currently receives any other outside agency services? No   Equipment Currently Used at Home cane, straight;oxygen   Do you have any problems affording any of your prescribed medications? No   Is the patient taking medications as prescribed? yes   Does the patient have transportation home? Yes   Transportation Available family or friend will provide   Does the patient receive services at the [facility]? No   Discharge Plan A Hospice/home   Patient/Family In Agreement With Plan yes     "

## 2018-02-12 NOTE — ED NOTES
Pt laying in bed, AAO to person, place, situation.  Bed in lowest, locked position, side rails up x 2, call light within reach.  Will continue to monitor.

## 2018-02-12 NOTE — ED NOTES
Pt to ED via EMS. Pt with end stage lung cancer, reporting difficulty swallowing x 2 days per pt report. EMS reporting pt's son on scene upon EMS arrival, and son was reporting pt with difficulty swallowing x 1 week. Pt very cachectic and thin, frail. Pt oriented x 4. Speech is clear. Denies difficulty breathing. Pt reporting she is on 2 L NC at home. Pt oriented to self, situation, and place.  Mild increase in RR noted from pt. No acute distress noted. Pt reports pain to sacrum. Bed is locked and in lowest position with side rails up x2. Call bell within reach and pt oriented to use of call bell. Pt placed on continuous cardiac monitoring, continuous pulse ox, and continuous BP cuff. Will continue to monitor.

## 2018-02-12 NOTE — CONSULTS
Patient admitted with two stage 3 pressure injuries; one to the coccyx and one to right buttock.  Patient is cachectic in appearance with minimal tissue reserves.  Patient is able to turn side to side.  Bordered foam dressing placed as these are shallow stage 3 ulcers.  The ulcer on the coccyx is approximately 2.5 cm diameter and 0.3 cm deep; the right buttock stage 3 ulcer is 3.5 cm diameter and 0.3 cm deep.  Both with pink,moist base.  Patient is on turning schedule and isoflex mattress.

## 2018-02-12 NOTE — HPI
"Ms. Licha Pop is a 62 y.o. female, with PMH of metastatic lung cancer, NIDDM-2, HTN, COPD, who presented to [facility] ED on 2/11/18 2/2 sudden onset difficulty swallowing since this morning. She describes feeling like "something is coming back up" when swallowing, and is unable to tolerate PO intake of liquids or solids. She reports fatigue, loss of appetite, nausea, vomiting, and weakness. She denies fever, facial swelling, oral lesions, beginning new medications, chest pain, SOB, or speech difficulty, abdominal pain, dysuria, decreased urination.     She was evaluated in the ED, and found to have dehydration, FRANCA, and failure to thrive. She was admitted to further treat the aforementioned, and discuss hospice.     "

## 2018-02-12 NOTE — PLAN OF CARE
Problem: Patient Care Overview  Goal: Plan of Care Review  Outcome: Ongoing (interventions implemented as appropriate)  Patient on 4 lpm NC, BBS clear no PRN treatment needed. Will continue to monitor.

## 2018-02-12 NOTE — ASSESSMENT & PLAN NOTE
- last A1C:   Lab Results   Component Value Date    HGBA1C 6.5 (H) 11/26/2017      - A1C pending for AM   - hold oral antidiabetic meds   - SSRI with accuchekcs AC/HS

## 2018-02-12 NOTE — PLAN OF CARE
Problem: Patient Care Overview  Goal: Plan of Care Review  Pt currently on 4lpm,  . Pt in no distress at this time. Will continue to monitor.

## 2018-02-12 NOTE — PT/OT/SLP EVAL
"Speech Language Pathology Evaluation  Bedside Swallow    Patient Name:  Licha Pop   MRN:  [id number]  Admitting Diagnosis: FRANCA (acute kidney injury)    Recommendations:                 General Recommendations:    1. Recommend consideration of an esophagram due to history of lung cancer treated with chemotherapy pt at high risk for esophageal dysphagia.    2. Speech to follow 5-6x/week for remediation of dysphagia.    3. Dietitian consult for recommendation for liquid supplement.  Diet recommendations:  Puree, Thin , continue alternate means of hydration   Aspiration Precautions:   1. 1 bite/sip at a time,   2. Alternating bites/sips,   3. Assistance with meals,   4. Double swallow with each bite/sip,  5. Eliminate distractions,   6.Feed only when awake/alert,   7.Frequent oral care,   8. HOB to 90 degrees,   9.Meds crushed in puree,  10. Remain upright 30 minutes post meal and  11. Small bites/sips   General Precautions: Standard, aspiration      History:     HPI: Ms. Licha Pop is a 62 y.o. female, with PMH of metastatic lung cancer, NIDDM-2, HTN, COPD, who presented to [facility] ED on 2/11/18 2/2 sudden onset difficulty swallowing since 2/11/18. She describes feeling like "something is coming back up" when swallowing, and is unable to tolerate PO intake of liquids or solids. She reports fatigue, loss of appetite, nausea, vomiting, and weakness. She denies fever, facial swelling, oral lesions, beginning new medications, chest pain, SOB, or speech difficulty, abdominal pain, dysuria, decreased urination.      She was evaluated in the ED, and found to have dehydration, FRANCA, and failure to thrive. She was admitted to further treat the aforementioned, and discuss hospice.     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     - received 6 cycles of cisplatin/etoposide chemotherapy, which was completed in June 2017              - then lost to follow up until presented to the ED on " 11/25/17 with complaints of left face, arm and leg numbness and possibly some arm and leg weakness              - MRI brain 11/26/17 showed an enhancing lesion within the right nehemias and extending into the right middle cerebellar peduncle & a second enhancing lesion within the right lateral cerebellum               - CT C/A/P showed progression of both the posterior OMAIRA mass and the anterior mediastinal mass, encasing the L PA              - Evaluated by neurosurgery for Gamma Knife radiosurgery & Rad onc consult for whole brain RT was recommended  - On prednisone 10mg daily and Percocet 5s, 1 tab po q6hrs prn for headache control          Past Medical History:   Diagnosis Date    Cancer     COPD (chronic obstructive pulmonary disease)     noted in MD h/p pt not aware of this diagnosis    Diabetes mellitus     Hypertension     On home oxygen therapy     since jan 2017     Subjective     Pt lethargic in bed. Reporting inability to get food and liquids down. Symptoms started 2/11/18. Reporting sensation of food and liquids sticking in throat.     Objective:     Cognitive communication: pt awake yet lethargic. Able to follow simple 1-2 step commands. Able to answer basic y/n questions, but some confusion regarding recent timelines of medical history and onset of dysphagia. Able to express basic wants and needs 75% of the time. Instances of difficulty with timelines and descriptions.     Oral Musculature Evaluation  · Oral Musculature: general weakness  · Dentition: present and adequate, gums in poor condition, teeth in poor condition  · Secretion Management: adequate  · Mucosal Quality: dry, sticky  · Oral Labial Strength and Mobility: other (see comments) (impaired strength)  · Lingual Strength and Mobility: impaired strength    Bedside Swallow Eval:   Consistencies Assessed:  · Thin liquids (3-5mls via spoon and small sips via straw)  · Puree (1/2 tsp of pudding)  · Solid (small bites of cracker)    Oral Phase:    · Generalized oral motor weakness   · Lip seal WFL with no anterior spillage with liquids, purees, or solids  · Mildly dcr formation of a cohesive bolus   · Slow a-p transport   · Slow mastication of solids   · Mild oral residuals with need for multiple swallows     Pharyngeal Phase:   · Trigger of pharyngeal swallow appears to be delayed   · No overt signs of airway threat or aspiration on 3-5ml sips of liquids, purees, or solids: no coughing, no wet vocal quality. Able to consume 8oz of water, 2oz of puree, and half of a anne-marie cracker.  · Onset of throat clearing after swallow on larger volume sips of liquids and intermittently with purees and solids. Increased instances of throat clearing with solids. Suggestive of pharyngeal residuals and weak pharyngeal contraction  · No overt signs of inability to achieve pharyngoesophageal transit   · No overt signs of backflow from the esophagus on controlled amounts. However, given her history and reason for admission recommend consideration of esophagram.       Assessment:     Licha Pop is a 62 y.o. female with an SLP diagnosis of Dysphagia.  She presents with ability to consume liquids and purees at this time.     Goals:    SLP Goals        Problem: SLP Goal    Goal Priority Disciplines Outcome   SLP Goal     SLP Ongoing (interventions implemented as appropriate)   Description:  1. Pt will be able to consume a pureed diet with thin liquids with no signs of airway threat or aspiration given mod cues to follow through on compensatory strategies: small bites and sips, multiple swallows.  2. Pt will be able to advance to a mechanical soft diet with thin liquids with no signs of airway threat or aspiration given mod cues to follow through on compensatory strategies: small bites and sips, multiple swallows.                      Plan:     · Patient to be seen:  5 x/week, 6 x/week   · Plan of Care expires:  02/19/18  · Plan of Care reviewed with:  patient (RN)    · SLP Follow-Up:  Yes       Discharge recommendations:    speech therapy at next level of care       Time Tracking:     SLP Treatment Date:   02/12/18  Speech Start Time:  0850  Speech Stop Time:  0920     Speech Total Time (min):  30 min    Billable Minutes: Eval Swallow and Oral Function 30    YORDAN Oneill  02/12/2018

## 2018-02-12 NOTE — ASSESSMENT & PLAN NOTE
- likely 2/2 reported enlargement of left hilar mass   - SLP consulted   - NPO until diet rec's made   - IV fluids for hydration

## 2018-02-12 NOTE — H&P
"[facility] Medicine  History & Physical    Patient Name: Licha Pop  MRN: [id number]  Admission Date: 2/11/2018  Attending Physician: Ana Mensah, *   Primary Care Provider: Primary Doctor No         Patient information was obtained from patient, past medical records and ER records.     Subjective:     Principal Problem:FRANCA (acute kidney injury)    Chief Complaint:   Chief Complaint   Patient presents with    Difficulty Swallowing     pt complaining of difficulty swallowing onset this am.  denies any SOB states feels like something is in her throat  patient has lung ca        HPI: Ms. Licha Pop is a 62 y.o. female, with PMH of metastatic lung cancer, NIDDM-2, HTN, COPD, who presented to [facility] ED on 2/11/18 2/2 sudden onset difficulty swallowing since this morning. She describes feeling like "something is coming back up" when swallowing, and is unable to tolerate PO intake of liquids or solids. She reports fatigue, loss of appetite, nausea, vomiting, and weakness. She denies fever, facial swelling, oral lesions, beginning new medications, chest pain, SOB, or speech difficulty, abdominal pain, dysuria, decreased urination.     She was evaluated in the ED, and found to have dehydration, FRANCA, and failure to thrive. She was admitted to further treat the aforementioned, and discuss hospice.       Past Medical History:   Diagnosis Date    Cancer     COPD (chronic obstructive pulmonary disease)     noted in MD h/p pt not aware of this diagnosis    Diabetes mellitus     Hypertension     On home oxygen therapy     since jan 2017       History reviewed. No pertinent surgical history.    Review of patient's allergies indicates:  No Known Allergies    No current facility-administered medications on file prior to encounter.      Current Outpatient Prescriptions on File Prior to Encounter   Medication Sig    acetaminophen (TYLENOL) 500 MG tablet Take " 500 mg by mouth nightly as needed for Pain.    albuterol (ACCUNEB) 0.63 mg/3 mL Nebu Take 3 mLs (0.63 mg total) by nebulization every 6 (six) hours as needed. Rescue    metFORMIN (GLUCOPHAGE) 500 MG tablet Take 500 mg by mouth every evening.    oxyCODONE-acetaminophen (PERCOCET) 5-325 mg per tablet Take 1 tablet by mouth every 6 (six) hours as needed for Pain.    valsartan (DIOVAN) 160 MG tablet Take 1 tablet (160 mg total) by mouth once daily.     Family History     None        Social History Main Topics    Smoking status: Former Smoker     Years: 40.00     Types: Cigarettes    Smokeless tobacco: Never Used    Alcohol use No    Drug use: No    Sexual activity: Not on file     Review of Systems   Constitutional: Positive for appetite change, fatigue and unexpected weight change (weight loss). Negative for chills, diaphoresis and fever.   HENT: Positive for trouble swallowing. Negative for congestion, ear pain, facial swelling, postnasal drip, rhinorrhea and sore throat.    Respiratory: Negative for cough, shortness of breath and wheezing.    Cardiovascular: Negative for chest pain and palpitations.   Gastrointestinal: Negative for abdominal pain, diarrhea, nausea and vomiting.   Genitourinary: Negative for dysuria, flank pain, frequency, hematuria and urgency.   Skin: Negative for color change and pallor.   Neurological: Positive for weakness. Negative for dizziness, syncope, speech difficulty, light-headedness, numbness and headaches.     Objective:     Vital Signs (Most Recent):  Temp: 97.5 °F (36.4 °C) (02/12/18 0100)  Pulse: 106 (02/11/18 2154)  Resp: (!) 24 (02/12/18 0100)  BP: 100/69 (02/12/18 0100)  SpO2: (!) 94 % (02/11/18 2055) Vital Signs (24h Range):  Temp:  [97.4 °F (36.3 °C)-97.7 °F (36.5 °C)] 97.5 °F (36.4 °C)  Pulse:  [104-117] 106  Resp:  [19-24] 24  SpO2:  [94 %-99 %] 94 %  BP: (100-133)/(42-88) 100/69     Weight: 30.6 kg (67 lb 7.4 oz)  Body mass index is 11.58 kg/m².    Physical Exam    Constitutional: She is oriented to person, place, and time. She appears well-developed and well-nourished. No distress.   Cachetic female.    HENT:   Head: Normocephalic and atraumatic.   Eyes: Conjunctivae and EOM are normal. Pupils are equal, round, and reactive to light. No scleral icterus.   Neck: Normal range of motion. Neck supple. No JVD present. No tracheal deviation present.   Cardiovascular: Normal rate, regular rhythm, normal heart sounds and intact distal pulses.  Exam reveals no gallop and no friction rub.    No murmur heard.  Pulmonary/Chest: Effort normal and breath sounds normal. No stridor. No respiratory distress. She has no wheezes. She has no rales.   Abdominal: Soft. Bowel sounds are normal. She exhibits no distension. There is no tenderness. There is no guarding.   Neurological: She is alert and oriented to person, place, and time.   Skin: Skin is warm and dry. She is not diaphoretic.   Skin dry and flaking.    Psychiatric: She has a normal mood and affect. Her behavior is normal. Judgment and thought content normal.   Nursing note and vitals reviewed.        CRANIAL NERVES     CN III, IV, VI   Pupils are equal, round, and reactive to light.  Extraocular motions are normal.        Significant Labs:   BMP:   Recent Labs  Lab 02/11/18  1758   *      K 5.0      CO2 16*   BUN 71*   CREATININE 1.9*   CALCIUM 10.0   MG 2.6     CBC:   Recent Labs  Lab 02/11/18  1758   WBC 9.14   HGB 12.8   HCT 39.9   *     CMP:   Recent Labs  Lab 02/11/18  1758      K 5.0      CO2 16*   *   BUN 71*   CREATININE 1.9*   CALCIUM 10.0   PROT 7.1   ALBUMIN 3.4*   BILITOT 0.7   ALKPHOS 59   AST 31   ALT 17   ANIONGAP 21*   EGFRNONAA 28*     All pertinent labs within the past 24 hours have been reviewed.    Significant Imaging: I have reviewed all pertinent imaging results/findings within the past 24 hours.   Imaging Results          X-Ray Chest 1 View (Final result)  Result  time 02/11/18 20:34:32    Final result by Eliel Mora MD (02/11/18 20:34:32)                 Impression:        Interval increase in size of the cardiomediastinal silhouette, which may be exaggerated by magnification or related to pericardial effusion. Clinical correlation is recommended.    Interval enlargement of left suprahilar mass and/or worsening postobstructive atelectasis/pneumonia.    Severe bilateral emphysematous changes.    Linear structure overlies the right breast and may be external to the patient, recommend correlation with direct visualization.      Electronically signed by: Eliel Mora  Date:     02/11/18  Time:    20:34              Narrative:    COMPARISON: 11/25/2017.    FINDINGS: One view of the chest was obtained.  Interval enlargement of the cardiomediastinal silhouette. Interval enlargement of a large opacity in the left suprahilar region consistent with mass lesion identified on recent CT.  Severe bilateral emphysematous changes with coarsened interstitial lung markings. Right-sided central venous catheter terminates overlying the SVC. Unchanged left-sided PICC terminating over the lower SVC. Linear structure overlies the right breast, possibly external to the patient.                               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 Oliva  Per Oncology Notes:  - received 6 cycles of cisplatin/etoposide chemotherapy, which was completed in June 2017   - then lost to follow up until presented to the ED on 11/25/17 with complaints of left face, arm and leg numbness and possibly some arm and leg weakness   - MRI brain 11/26/17 showed an enhancing lesion within the right nehemias and extending into the right  middle cerebellar peduncle & a second enhancing lesion within the right lateral cerebellum    - CT C/A/P showed progression of both the posterior OMAIRA mass and the anterior mediastinal mass, encasing the L PA   - Evaluated by neurosurgery for Gamma Knife radiosurgery & Rad onc consult for whole brain RT was recommended  - On prednisone 10mg daily and Percocet 5s, 1 tab po q6hrs prn for headache control         Essential hypertension    - mild elevation upon admission   - continue home meds  - monitor        Dysphagia    - likely 2/2 reported enlargement of left hilar mass   - SLP consulted   - NPO until diet rec's made   - IV fluids for hydration           Type 2 diabetes mellitus with complication    - last A1C:   Lab Results   Component Value Date    HGBA1C 6.5 (H) 11/26/2017      - A1C pending for AM   - hold oral antidiabetic meds   - SSRI with accuchekcs AC/HS          COPD (chronic obstructive pulmonary disease)    - no apparent exacerbation  - continue home meds  - on 2L O2 at home per Oncology notes    - moonitor            VTE Risk Mitigation         Ordered     heparin (porcine) injection 5,000 Units  Every 8 hours     Route:  Subcutaneous        02/11/18 2204     Place ELIANA hose  Until discontinued      02/11/18 2204     Place sequential compression device  Until discontinued      02/11/18 2204     Medium Risk of VTE  Once      02/11/18 2204     Place ELIANA hose  Until discontinued      02/11/18 2204     Place sequential compression device  Until discontinued      02/11/18 2204             KIM FitzgeraldC  Department of Hospital Medicine   [facility]

## 2018-02-12 NOTE — CONSULTS
[facility]  Adult Nutrition  Consult Note    SUMMARY     Recommendations    Recommendation/Intervention: 1. Continue Puree diet per SLP Rx    2.  Add Boost Glucose Control x3/day    3.  Assist patient with all meals and encourage increased intake    4.  Honor food requests/preferences within diet restriction     5.  RD to follow  and available for Rx if alternate form of nutrition desired.   Goals: Pt to tolerate Diet Rx/supplement   Nutrition Goal Status: new  Communication of RD Recs: reviewed with RN (Reviewed with patient )    Continuum of Care Plan      Reason for Assessment    Reason for Assessment: physician consult  Diagnosis: cancer diagnosis/related complications (Acute Kidney Injury)  Relevent Medical History: Mets Lung CA, DM2, COPD    Interdisciplinary Rounds: did not attend     General Information Comments: Visited patient who had not touched meal at bedside, had difficulty answering questions and was extremely weak.  Appears very malnourished     Nutrition Discharge Planning: Hospice Care     Nutrition Prescription Ordered    Current Diet Order: Full Liquid - advance to regular as tolerated.  Noted SLP recommends Puree consistency and recommend Boost Glucose control supplement with meals   Nutrition Order Comments: Pt extremely weak, malnourished and noted conversation with family regarding Hospice care to be considered.  Doubtful that patient will be able to meet nutrient needs related to diagnosis, poor PO intake, inability to feed self.  Question if family will want alternate form of nutrition and RD available if needed for Rx            Oral Nutrition Supplement: Add Boost Glucose control x3      Evaluation of Received Nutrients/Fluid Intake  Energy Calories Required: not meeting needs  Protein Required: not meeting needs  Fluid Required: not meeting needs     Tolerance: not tolerating    Nutrition Risk Screen     Nutrition Risk Screen: dysphagia or difficulty  "swallowing    Nutrition/Diet History    Patient Reported Diet/Restrictions/Preferences: general  Typical Food/Fluid Intake: Poor prior to admit   Food Preferences: No cultural or religous preferences identified   Meal/Snack Patterns: Varied      Factors Affecting Nutritional Intake: anorexia, chewing difficulties/inability to chew food, decreased appetite, difficulty/impaired swallowing, early satiety, pain    Labs/Tests/Procedures/Meds       Pertinent Labs Reviewed: reviewed, pertinent  Pertinent Labs Comments: BUN 65, Na 147  Pertinent Medications Reviewed: reviewed, pertinent       Physical Findings    Overall Physical Appearance: listlessness, loss of muscle mass, underweight, weak     Oral/Mouth Cavity: tooth/teeth missing  Skin: pressure ulcer(s), poor skin integrity/turgor    Anthropometrics    Temp: 97.8 °F (36.6 °C)  Height Method: Stated  Height: 5' 4" (162.6 cm)  Weight Method: Bed Scale  Weight: 30.6 kg (67 lb 7.4 oz)     Ideal Body Weight (IBW), Female: 120 lb     % Ideal Body Weight, Female (lb): 56.22 lb  BMI (Calculated): 11.6  BMI Grade: less than 16 protein-energy malnutrition grade III  Weight Loss: unintentional    Estimated/Assessed Needs    Weight Used For Calorie Calculations: 30.6 kg (67 lb 7.4 oz)         Energy Need Method: Kcal/kg (25-30kcals/kg/BW)  Indirect Calorimetry: 750-950    RMR (Peach-St. Jeor Equation): 861        Weight Used For Protein Calculations: 30.6 kg (67 lb 7.4 oz) (1.0-1.4gmsProtein/kg/BW)  Protein Requirements: 30-45  Fluid Need Method: RDA Method (1ml/1kcal or MD Rx )                        Assessment and Plan    P:  Inadequate nutrient intake   E:  Related to diagnosis  S:  As evidenced by patient visitation, RD assessment; pt medical history, NSG documentation  Status:  New    P:  Increased Protein needs  E:  Related to stage 3 coccyx wound  S:  As evidenced by NSG documentation  Status; New     Monitor and Evaluation    Food and Nutrient Intake: food and beverage " intake  Food and Nutrient Adminstration: diet order     Physical Activity and Function: nutrition-related ADLs and IADLs  Anthropometric Measurements: weight, weight change  Biochemical Data, Medical Tests and Procedures: electrolyte and renal panel, gastrointestinal profile, inflammatory profile, glucose/endocrine profile  Nutrition-Focused Physical Findings: overall appearance    Nutrition Risk    Level of Risk: other (see comments) (follow up x2/week )    Nutrition Follow-Up    RD Follow-up?: Yes

## 2018-02-12 NOTE — PLAN OF CARE
"Met with son Philippe at bedside & discussed hospice care. Son agreed to an informational visit stating "i really don't know anything about hospice but as long as she's in agreement I don't think it's a bad idea." Referral sent to [facility] via [facility]. Spoke with Gini at [facility] who will contact family   "

## 2018-02-13 NOTE — PT/OT/SLP PROGRESS
"Speech Language Pathology Treatment    Patient Name:  Licha Pop   MRN:  [id number]  Admitting Diagnosis: FRANCA (acute kidney injury)    Recommendations:     Diet recommendations:  Puree, Liquid Diet Level: Thin     Aspiration Precautions: 1 bite/sip at a time, Assistance with meals, Eliminate distractions, Feed only when awake/alert, Frequent oral care, HOB to 90 degrees, Meds crushed in puree, Remain upright 30 minutes post meal and Small bites/sips   General Precautions: Standard, aspiration      Subjective   Pt lethargic, laying in bed. Agreeable to small sips of water. RN reporting minimal oral intake since yesterday    Objective:     Has the patient been evaluated by SLP for swallowing?   Yes  Keep patient NPO? No   Current Respiratory Status: nasal cannula      COGNITIVE STATUS: Sleeping, alerts to her name. Lethargic, able to maintain wakefulness for 10-15 secs. Within in limits of alertness able to participate in limited amounts of oral intake.    SWALLOWING: Pt able to consume small 3-5 ml sips of thin liquids via straw with no overt signs of aspiration on 3/3 trials. No coughing, throat clearing or wet vocal quality after swallow. Pt able to achieve pharyngo esophageal transit of small sips of thin liquids with no signs of backflow or dcr transit.  Minimal oral intake achieved. Pt stating "That is enough" after 3 trials and declined purees.    Assessment:     Licha Pop is a 62 y.o. female with minimal oral intake, admitted after instance of not being able swallow. Able to consume  Small amounts of thin liquids this session with no overt signs of aspiration    Goals:    SLP Goals        Problem: SLP Goal    Goal Priority Disciplines Outcome   SLP Goal     SLP Ongoing (interventions implemented as appropriate)   Description:  1. Pt will be able to consume a pureed diet with thin liquids with no signs of airway threat or aspiration given mod cues to follow through on compensatory " strategies: small bites and sips, multiple swallows.  2. Pt will be able to advance to a mechanical soft diet with thin liquids with no signs of airway threat or aspiration given mod cues to follow through on compensatory strategies: small bites and sips, multiple swallows.                      Plan:     · Patient to be seen:  5 x/week, 6 x/week   · Plan of Care expires:  02/19/18  · Plan of Care reviewed with:  patient (RN)   · SLP Follow-Up:  Yes       Discharge recommendations:    home hospice      Time Tracking:     SLP Treatment Date:   02/13/18  Speech Start Time:  0830  Speech Stop Time:  0845     Speech Total Time (min):  15 min    Billable Minutes: Treatment Swallowing Dysfunction 15 min    Mary Andrew CCC-SLP  02/13/2018

## 2018-02-13 NOTE — PLAN OF CARE
Problem: Patient Care Overview  Goal: Plan of Care Review  Outcome: Ongoing (interventions implemented as appropriate)  Patient remains free of falls or trauma at this time. AVSS, no s/s of infection noted. Voiding adequate amounts of urine. Pain moderately controlled with current PRN regime. Patient/caregiver without complaints or concerns. Purposeful hourly rounding done. Safety measures maintained. Plan of care reviewed. Will monitor.

## 2018-02-13 NOTE — ASSESSMENT & PLAN NOTE
- hold oral antidiabetic meds   - PRN insulin  -Patient is currently hypoglycemic and will start glucose containing fluids

## 2018-02-13 NOTE — HOSPITAL COURSE
Patient and son have agreed to [facility]. Case Management arranging a meeting with representative and family.  The patient;s condition continued to decline.  She was given supportive therapy with supplemental oxygen and pain management.  The patient  with family members at the bedside.  Nursing and  at the bedside for extended support.

## 2018-02-13 NOTE — PLAN OF CARE
Problem: Patient Care Overview  Goal: Plan of Care Review  Outcome: Ongoing (interventions implemented as appropriate)  Patient received on RA SAT 96%, will continue to monitor.

## 2018-02-13 NOTE — PLAN OF CARE
Problem: Pressure Ulcer Risk (Seth Scale) (Adult,Obstetrics,Pediatric)  Goal: Skin Integrity  Patient will demonstrate the desired outcomes by discharge/transition of care.   Outcome: Ongoing (interventions implemented as appropriate)  Patient remains free of falls or trauma at this time. AVSS, no s/s of infection noted. Poor appetite. Voiding small amounts of urine. C/o SOB at times. Pain moderately controlled with current PRN regime. Patient/caregiver without complaints or concerns. Purposeful hourly rounding done. Safety measures maintained. Plan of care reviewed. Will monitor.

## 2018-02-13 NOTE — SUBJECTIVE & OBJECTIVE
Interval History: The patient is hypersomnolent, but has some hypoglycemia and limited urine output    Review of Systems  Objective:     Vital Signs (Most Recent):  Temp: 98 °F (36.7 °C) (02/13/18 0738)  Pulse: 103 (02/13/18 0738)  Resp: 20 (02/12/18 2100)  BP: 124/74 (02/13/18 0738)  SpO2: (!) 93 % (02/13/18 0738) Vital Signs (24h Range):  Temp:  [97.3 °F (36.3 °C)-98 °F (36.7 °C)] 98 °F (36.7 °C)  Pulse:  [] 103  Resp:  [16-20] 20  SpO2:  [92 %-95 %] 93 %  BP: ()/(57-74) 124/74     Weight: 30.6 kg (67 lb 7.4 oz)  Body mass index is 11.58 kg/m².    Intake/Output Summary (Last 24 hours) at 02/13/18 0815  Last data filed at 02/13/18 0700   Gross per 24 hour   Intake             3000 ml   Output              600 ml   Net             2400 ml      Physical Exam   Constitutional: She is oriented to person, place, and time. She appears well-developed and well-nourished. No distress.   Cachetic, chronically ill appearing female in NAD.    HENT:   Head: Normocephalic and atraumatic.   Eyes: Conjunctivae and EOM are normal. Pupils are equal, round, and reactive to light. No scleral icterus.   Neck: Normal range of motion. Neck supple. No JVD present. No tracheal deviation present.   Cardiovascular: Normal rate, regular rhythm, normal heart sounds and intact distal pulses.  Exam reveals no gallop and no friction rub.    No murmur heard.  Pulmonary/Chest: Effort normal and breath sounds normal. No stridor. No respiratory distress. She has no wheezes. She has no rales.   Abdominal: Soft. Bowel sounds are normal. She exhibits no distension. There is no tenderness. There is no guarding.   Neurological: She is alert and oriented to person, place, and time.   Skin: Skin is warm and dry. She is not diaphoretic.   Skin dry and flaking.    Psychiatric: She has a normal mood and affect. Her behavior is normal. Judgment and thought content normal.   Nursing note and vitals reviewed.    Significant Labs:   CBC:   Recent  Labs  Lab 02/11/18  1758 02/12/18  0702   WBC 9.14 9.54   HGB 12.8 11.7*   HCT 39.9 35.5*   * 132*     CMP:   Recent Labs  Lab 02/11/18 1758 02/12/18  0702    147*   K 5.0 4.6    111*   CO2 16* 21*   * 97   BUN 71* 65*   CREATININE 1.9* 1.4   CALCIUM 10.0 9.2   PROT 7.1  --    ALBUMIN 3.4*  --    BILITOT 0.7  --    ALKPHOS 59  --    AST 31  --    ALT 17  --    ANIONGAP 21* 15   EGFRNONAA 28* 41*     Magnesium:   Recent Labs  Lab 02/11/18 1758 02/12/18  0702   MG 2.6 2.3       Significant Imaging: I have reviewed and interpreted all pertinent imaging results/findings within the past 24 hours.

## 2018-02-13 NOTE — ASSESSMENT & PLAN NOTE
- follow with Dr. Oliva  Per Oncology Notes:  - received 6 cycles of cisplatin/etoposide chemotherapy, which was completed in June 2017   - then lost to follow up until presented to the ED on 11/25/17 with complaints of left face, arm and leg numbness and possibly some arm and leg weakness   - MRI brain 11/26/17 showed an enhancing lesion within the right nehemias and extending into the right middle cerebellar peduncle & a second enhancing lesion within the right lateral cerebellum    - CT C/A/P showed progression of both the posterior OMAIRA mass and the anterior mediastinal mass, encasing the L PA   - Evaluated by neurosurgery for Gamma Knife radiosurgery & Rad onc consult for whole brain RT was recommended  - On prednisone 10mg daily and Percocet 5s, 1 tab po q6hrs prn for headache control   - Plan is for patient to be discharged home with hospice care with [facility]

## 2018-02-13 NOTE — ASSESSMENT & PLAN NOTE
- follow with Dr. Oliva  Per Oncology Notes:  - received 6 cycles of cisplatin/etoposide chemotherapy, which was completed in June 2017   - then lost to follow up until presented to the ED on 11/25/17 with complaints of left face, arm and leg numbness and possibly some arm and leg weakness   - MRI brain 11/26/17 showed an enhancing lesion within the right nehemias and extending into the right middle cerebellar peduncle & a second enhancing lesion within the right lateral cerebellum    - CT C/A/P showed progression of both the posterior OMAIRA mass and the anterior mediastinal mass, encasing the L PA   - Evaluated by neurosurgery for Gamma Knife radiosurgery & Rad onc consult for whole brain RT was recommended  - On prednisone 10mg daily and Percocet 5s, 1 tab po q6hrs prn for headache control   - Patient and son agreed to home hospice.  [facility] consulted.  Case Management arranging family meeting.

## 2018-02-13 NOTE — PROGRESS NOTES
"[facility]  Hospital Medicine  Progress Note    Patient Name: Licha Pop  MRN: [id number]  Patient Class: IP- Inpatient   Admission Date: 2/11/2018  Length of Stay: 1 days  Attending Physician: Ana Mensah, *  Primary Care Provider: Primary Doctor No        Subjective:     Principal Problem:FRANCA (acute kidney injury)    HPI:  Ms. Licha Pop is a 62 y.o. female, with PMH of metastatic lung cancer, NIDDM-2, HTN, COPD, who presented to [facility] ED on 2/11/18 2/2 sudden onset difficulty swallowing since this morning. She describes feeling like "something is coming back up" when swallowing, and is unable to tolerate PO intake of liquids or solids. She reports fatigue, loss of appetite, nausea, vomiting, and weakness. She denies fever, facial swelling, oral lesions, beginning new medications, chest pain, SOB, or speech difficulty, abdominal pain, dysuria, decreased urination.     She was evaluated in the ED, and found to have dehydration, FRANCA, and failure to thrive. She was admitted to further treat the aforementioned, and discuss hospice.       Hospital Course:  Patient and son have agreed to [facility]. Case Management arranging a meeting with representative and family.     Interval History: Pain controlled and resting comfortably.  Discussed plan of care and patient in agreement.     Review of Systems   Constitutional: Positive for appetite change, fatigue and unexpected weight change (weight loss). Negative for chills, diaphoresis and fever.   HENT: Positive for trouble swallowing. Negative for congestion, ear pain, facial swelling, postnasal drip, rhinorrhea and sore throat.    Respiratory: Negative for cough, shortness of breath and wheezing.    Cardiovascular: Negative for chest pain and palpitations.   Gastrointestinal: Negative for abdominal pain, diarrhea, nausea and vomiting.   Genitourinary: Negative for dysuria, flank pain, " frequency, hematuria and urgency.   Skin: Negative for color change and pallor.   Neurological: Positive for weakness. Negative for dizziness, syncope, speech difficulty, light-headedness, numbness and headaches.     Objective:     Vital Signs (Most Recent):  Temp: 97.6 °F (36.4 °C) (02/12/18 1622)  Pulse: 99 (02/12/18 1622)  Resp: 16 (02/12/18 1622)  BP: 104/69 (02/12/18 1622)  SpO2: (!) 92 % (02/12/18 1622) Vital Signs (24h Range):  Temp:  [97.4 °F (36.3 °C)-97.8 °F (36.6 °C)] 97.6 °F (36.4 °C)  Pulse:  [] 99  Resp:  [16-24] 16  SpO2:  [92 %-94 %] 92 %  BP: ()/(57-88) 104/69     Weight: 30.6 kg (67 lb 7.4 oz)  Body mass index is 11.58 kg/m².    Intake/Output Summary (Last 24 hours) at 02/12/18 2018  Last data filed at 02/12/18 1900   Gross per 24 hour   Intake          2268.75 ml   Output              450 ml   Net          1818.75 ml      Physical Exam   Constitutional: She is oriented to person, place, and time. She appears well-developed and well-nourished. No distress.   Cachetic female.    HENT:   Head: Normocephalic and atraumatic.   Eyes: Conjunctivae and EOM are normal. Pupils are equal, round, and reactive to light. No scleral icterus.   Neck: Normal range of motion. Neck supple. No JVD present. No tracheal deviation present.   Cardiovascular: Normal rate, regular rhythm, normal heart sounds and intact distal pulses.  Exam reveals no gallop and no friction rub.    No murmur heard.  Pulmonary/Chest: Effort normal and breath sounds normal. No stridor. No respiratory distress. She has no wheezes. She has no rales.   Abdominal: Soft. Bowel sounds are normal. She exhibits no distension. There is no tenderness. There is no guarding.   Neurological: She is alert and oriented to person, place, and time.   Skin: Skin is warm and dry. She is not diaphoretic.   Skin dry and flaking.    Psychiatric: She has a normal mood and affect. Her behavior is normal. Judgment and thought content normal.   Nursing  note and vitals reviewed.      Significant Labs:   CBC:   Recent Labs  Lab 02/11/18  1758 02/12/18  0702   WBC 9.14 9.54   HGB 12.8 11.7*   HCT 39.9 35.5*   * 132*     CMP:   Recent Labs  Lab 02/11/18  1758 02/12/18  0702    147*   K 5.0 4.6    111*   CO2 16* 21*   * 97   BUN 71* 65*   CREATININE 1.9* 1.4   CALCIUM 10.0 9.2   PROT 7.1  --    ALBUMIN 3.4*  --    BILITOT 0.7  --    ALKPHOS 59  --    AST 31  --    ALT 17  --    ANIONGAP 21* 15   EGFRNONAA 28* 41*     All pertinent labs within the past 24 hours have been reviewed.    Significant Imaging: I have reviewed all pertinent imaging results/findings within the past 24 hours.    Assessment/Plan:      * FRANCA (acute kidney injury)    - likely 2/2 dehydration  - begin IV fluids   - resolved  - monitor daily         Healing pressure ulcer stage III              Dysphagia    - likely 2/2 reported enlargement of left hilar mass   - SLP consulted   - NPO until diet rec's made   - IV fluids for hydration           Adult failure to thrive    - 2/2 metastatic lung cancer   - Social work consulted for Hospice initiation        Type 2 diabetes mellitus with complication    - last A1C:   Lab Results   Component Value Date    HGBA1C 6.6 (H) 02/11/2018     - hold oral antidiabetic meds   - SSRI with accuchekcs AC/HS          Essential hypertension    - mild elevation upon admission   - continue home meds  - monitor        Thrombocytopenia due to drugs              COPD (chronic obstructive pulmonary disease)    - no apparent exacerbation  - continue home meds  - on 2L O2 at home per Oncology notes    - moonitor          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 Oliva  Per Oncology Notes:  - received 6 cycles of cisplatin/etoposide chemotherapy, which was completed in June 2017   - then lost to follow up until presented to the ED on 11/25/17 with complaints of left face, arm and leg numbness and possibly some arm  and leg weakness   - MRI brain 11/26/17 showed an enhancing lesion within the right nehemias and extending into the right middle cerebellar peduncle & a second enhancing lesion within the right lateral cerebellum    - CT C/A/P showed progression of both the posterior OMAIRA mass and the anterior mediastinal mass, encasing the L PA   - Evaluated by neurosurgery for Gamma Knife radiosurgery & Rad onc consult for whole brain RT was recommended  - On prednisone 10mg daily and Percocet 5s, 1 tab po q6hrs prn for headache control   - Patient and son agreed to home hospice.  [facility] consulted.  Case Management arranging family meeting.          VTE Risk Mitigation         Ordered     heparin (porcine) injection 5,000 Units  Every 8 hours     Route:  Subcutaneous        02/11/18 2204     Place ELIANA hose  Until discontinued      02/11/18 2204     Place sequential compression device  Until discontinued      02/11/18 2204     Medium Risk of VTE  Once      02/11/18 2204     Place ELIANA hose  Until discontinued      02/11/18 2204     Place sequential compression device  Until discontinued      02/11/18 2204              Ana Mensah MD  Department of Hospital Medicine   [facility]

## 2018-02-13 NOTE — PROGRESS NOTES
"[facility]  Hospital Medicine  Progress Note    Patient Name: Licha Pop  MRN: [id number]  Patient Class: IP- Inpatient   Admission Date: 2/11/2018  Length of Stay: 2 days  Attending Physician: Connie Dowd MD  Primary Care Provider: Primary Doctor No        Subjective:     Principal Problem:FRANCA (acute kidney injury)    HPI:  Ms. Licha Pop is a 62 y.o. female, with PMH of metastatic lung cancer, NIDDM-2, HTN, COPD, who presented to [facility] ED on 2/11/18 2/2 sudden onset difficulty swallowing since this morning. She describes feeling like "something is coming back up" when swallowing, and is unable to tolerate PO intake of liquids or solids. She reports fatigue, loss of appetite, nausea, vomiting, and weakness. She denies fever, facial swelling, oral lesions, beginning new medications, chest pain, SOB, or speech difficulty, abdominal pain, dysuria, decreased urination.     She was evaluated in the ED, and found to have dehydration, FRANCA, and failure to thrive. She was admitted to further treat the aforementioned, and discuss hospice.       Hospital Course:  Patient and son have agreed to [facility]. Case Management arranging a meeting with representative and family.  The patient is receiving IV fluids for hydration and blood glucose support for now.    Interval History: The patient is hypersomnolent, but has some hypoglycemia and limited urine output    Review of Systems  Objective:     Vital Signs (Most Recent):  Temp: 98 °F (36.7 °C) (02/13/18 0738)  Pulse: 103 (02/13/18 0738)  Resp: 20 (02/12/18 2100)  BP: 124/74 (02/13/18 0738)  SpO2: (!) 93 % (02/13/18 0738) Vital Signs (24h Range):  Temp:  [97.3 °F (36.3 °C)-98 °F (36.7 °C)] 98 °F (36.7 °C)  Pulse:  [] 103  Resp:  [16-20] 20  SpO2:  [92 %-95 %] 93 %  BP: ()/(57-74) 124/74     Weight: 30.6 kg (67 lb 7.4 oz)  Body mass index is 11.58 kg/m².    Intake/Output Summary (Last 24 hours) " at 02/13/18 0815  Last data filed at 02/13/18 0700   Gross per 24 hour   Intake             3000 ml   Output              600 ml   Net             2400 ml      Physical Exam   Constitutional: She is oriented to person, place, and time. She appears well-developed and well-nourished. No distress.   Cachetic, chronically ill appearing female in NAD.    HENT:   Head: Normocephalic and atraumatic.   Eyes: Conjunctivae and EOM are normal. Pupils are equal, round, and reactive to light. No scleral icterus.   Neck: Normal range of motion. Neck supple. No JVD present. No tracheal deviation present.   Cardiovascular: Normal rate, regular rhythm, normal heart sounds and intact distal pulses.  Exam reveals no gallop and no friction rub.    No murmur heard.  Pulmonary/Chest: Effort normal and breath sounds normal. No stridor. No respiratory distress. She has no wheezes. She has no rales.   Abdominal: Soft. Bowel sounds are normal. She exhibits no distension. There is no tenderness. There is no guarding.   Neurological: She is alert and oriented to person, place, and time.   Skin: Skin is warm and dry. She is not diaphoretic.   Skin dry and flaking.    Psychiatric: She has a normal mood and affect. Her behavior is normal. Judgment and thought content normal.   Nursing note and vitals reviewed.    Significant Labs:   CBC:   Recent Labs  Lab 02/11/18 1758 02/12/18  0702   WBC 9.14 9.54   HGB 12.8 11.7*   HCT 39.9 35.5*   * 132*     CMP:   Recent Labs  Lab 02/11/18 1758 02/12/18  0702    147*   K 5.0 4.6    111*   CO2 16* 21*   * 97   BUN 71* 65*   CREATININE 1.9* 1.4   CALCIUM 10.0 9.2   PROT 7.1  --    ALBUMIN 3.4*  --    BILITOT 0.7  --    ALKPHOS 59  --    AST 31  --    ALT 17  --    ANIONGAP 21* 15   EGFRNONAA 28* 41*     Magnesium:   Recent Labs  Lab 02/11/18 1758 02/12/18  0702   MG 2.6 2.3       Significant Imaging: I have reviewed and interpreted all pertinent imaging results/findings within the  past 24 hours.    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 Oliva  Per Oncology Notes:  - received 6 cycles of cisplatin/etoposide chemotherapy, which was completed in June 2017   - then lost to follow up until presented to the ED on 11/25/17 with complaints of left face, arm and leg numbness and possibly some arm and leg weakness   - MRI brain 11/26/17 showed an enhancing lesion within the right nehemias and extending into the right middle cerebellar peduncle & a second enhancing lesion within the right lateral cerebellum    - CT C/A/P showed progression of both the posterior OMAIRA mass and the anterior mediastinal mass, encasing the L PA   - Evaluated by neurosurgery for Gamma Knife radiosurgery & Rad onc consult for whole brain RT was recommended  - On prednisone 10mg daily and Percocet 5s, 1 tab po q6hrs prn for headache control   - Plan is for patient to be discharged home with hospice care with [facility]        Essential hypertension    -hold anti-hypertensive medications for now as BP is low normal        Adult failure to thrive    - 2/2 metastatic lung cancer   - Discharge planning for home with hospice in progress        Type 2 diabetes mellitus with complication    - hold oral antidiabetic meds   - PRN insulin  -Patient is currently hypoglycemic and will start glucose containing fluids        COPD (chronic obstructive pulmonary disease)    Stable, continue supportive management          Healing pressure ulcer stage III    Continue current wound care          Dysphagia    - likely 2/2 reported enlargement of left hilar mass   - SLP consulted   - NPO until diet rec's made   - IV fluids for hydration    -          Thrombocytopenia due to drugs    Continue to monitor  No signs of active bleeding          VTE Risk Mitigation         Ordered     heparin (porcine) injection 5,000 Units  Every 8  hours     Route:  Subcutaneous        02/11/18 2204     Medium Risk of VTE  Once      02/11/18 2204     Place ELIANA hose  Until discontinued      02/11/18 2204     Place sequential compression device  Until discontinued      02/11/18 2204              Connie Dowd MD  Department of Hospital Medicine   [facility]

## 2018-02-13 NOTE — PLAN OF CARE
Problem: Patient Care Overview  Goal: Plan of Care Review  Outcome: Ongoing (interventions implemented as appropriate)  Patient had an uneventful night, no falls, no skin breakdown, etc. Low glucose, given amp of dextrose, glucose increased. Have rechecked throughout the night. Purposeful hourly rounding, turning patient. C/o pain, given morphine with some relief, given one time order with dilaudid with relief. VSS.

## 2018-02-13 NOTE — ASSESSMENT & PLAN NOTE
- likely 2/2 reported enlargement of left hilar mass   - SLP consulted   - NPO until diet rec's made   - IV fluids for hydration    -

## 2018-02-13 NOTE — ASSESSMENT & PLAN NOTE
- last A1C:   Lab Results   Component Value Date    HGBA1C 6.6 (H) 02/11/2018     - hold oral antidiabetic meds   - SSRI with accuchekcs AC/HS

## 2018-02-13 NOTE — PLAN OF CARE
Family scheduled to meet with [facility] 2/14/18 at 4pm. This is the soonest time family available due to different sons working different shifts. Will follow up

## 2018-02-13 NOTE — SUBJECTIVE & OBJECTIVE
Interval History: Pain controlled and resting comfortably.  Discussed plan of care and patient in agreement.     Review of Systems   Constitutional: Positive for appetite change, fatigue and unexpected weight change (weight loss). Negative for chills, diaphoresis and fever.   HENT: Positive for trouble swallowing. Negative for congestion, ear pain, facial swelling, postnasal drip, rhinorrhea and sore throat.    Respiratory: Negative for cough, shortness of breath and wheezing.    Cardiovascular: Negative for chest pain and palpitations.   Gastrointestinal: Negative for abdominal pain, diarrhea, nausea and vomiting.   Genitourinary: Negative for dysuria, flank pain, frequency, hematuria and urgency.   Skin: Negative for color change and pallor.   Neurological: Positive for weakness. Negative for dizziness, syncope, speech difficulty, light-headedness, numbness and headaches.     Objective:     Vital Signs (Most Recent):  Temp: 97.6 °F (36.4 °C) (02/12/18 1622)  Pulse: 99 (02/12/18 1622)  Resp: 16 (02/12/18 1622)  BP: 104/69 (02/12/18 1622)  SpO2: (!) 92 % (02/12/18 1622) Vital Signs (24h Range):  Temp:  [97.4 °F (36.3 °C)-97.8 °F (36.6 °C)] 97.6 °F (36.4 °C)  Pulse:  [] 99  Resp:  [16-24] 16  SpO2:  [92 %-94 %] 92 %  BP: ()/(57-88) 104/69     Weight: 30.6 kg (67 lb 7.4 oz)  Body mass index is 11.58 kg/m².    Intake/Output Summary (Last 24 hours) at 02/12/18 2018  Last data filed at 02/12/18 1900   Gross per 24 hour   Intake          2268.75 ml   Output              450 ml   Net          1818.75 ml      Physical Exam   Constitutional: She is oriented to person, place, and time. She appears well-developed and well-nourished. No distress.   Cachetic female.    HENT:   Head: Normocephalic and atraumatic.   Eyes: Conjunctivae and EOM are normal. Pupils are equal, round, and reactive to light. No scleral icterus.   Neck: Normal range of motion. Neck supple. No JVD present. No tracheal deviation present.    Cardiovascular: Normal rate, regular rhythm, normal heart sounds and intact distal pulses.  Exam reveals no gallop and no friction rub.    No murmur heard.  Pulmonary/Chest: Effort normal and breath sounds normal. No stridor. No respiratory distress. She has no wheezes. She has no rales.   Abdominal: Soft. Bowel sounds are normal. She exhibits no distension. There is no tenderness. There is no guarding.   Neurological: She is alert and oriented to person, place, and time.   Skin: Skin is warm and dry. She is not diaphoretic.   Skin dry and flaking.    Psychiatric: She has a normal mood and affect. Her behavior is normal. Judgment and thought content normal.   Nursing note and vitals reviewed.      Significant Labs:   CBC:   Recent Labs  Lab 02/11/18 1758 02/12/18  0702   WBC 9.14 9.54   HGB 12.8 11.7*   HCT 39.9 35.5*   * 132*     CMP:   Recent Labs  Lab 02/11/18 1758 02/12/18  0702    147*   K 5.0 4.6    111*   CO2 16* 21*   * 97   BUN 71* 65*   CREATININE 1.9* 1.4   CALCIUM 10.0 9.2   PROT 7.1  --    ALBUMIN 3.4*  --    BILITOT 0.7  --    ALKPHOS 59  --    AST 31  --    ALT 17  --    ANIONGAP 21* 15   EGFRNONAA 28* 41*     All pertinent labs within the past 24 hours have been reviewed.    Significant Imaging: I have reviewed all pertinent imaging results/findings within the past 24 hours.

## 2018-02-14 NOTE — PLAN OF CARE
Problem: Patient Care Overview  Goal: Plan of Care Review  Outcome: Ongoing (interventions implemented as appropriate)  Patient in no apparent distress. Sat's  97 % on 4 lpm. Sat's difficult to  but was obtained on left ear. PRN treatments not needed . 2315. Called to room by RENAE Campbell. RN and tech unable to obtain sat's. Patient complaining of shortness of breath and is tachypneic and using accessory muscles. BBS crackles. Multiple machines, probes and sites tried to obtain sat's. Sat's will occasionally be obtainable via forehead probe but readings fluctuating between low 50's to 95% . Emmie POTTS notified. ABG ordered and reported to Emmie POTTS. PRN treatment given. No further respiratory orders at this time. Will continue to monitor.

## 2018-02-14 NOTE — PLAN OF CARE
Spoke with Gini at [facility] to cancel consult      18 1023   Final Note   Assessment Type Final Discharge Note   Discharge Disposition

## 2018-02-14 NOTE — NURSING
Unable to auscultate heart sounds or palpate pulse.  No respirations.  Dr. Dowd arrived at 9:52 and pronounced pt .

## 2018-02-14 NOTE — DISCHARGE SUMMARY
"[facility]  Hospital Medicine  Discharge Summary      Patient Name: Licha Pop  MRN: [id number]  Admission Date: 2018  Hospital Length of Stay: 3 days  Discharge Date and Time:  2018 10:47 AM  Attending Physician: Connie Dowd MD   Discharging Provider: Connie Dowd MD  Primary Care Provider: Primary Doctor No      HPI:   Ms. Licha Pop is a 62 y.o. female, with PMH of metastatic lung cancer, NIDDM-2, HTN, COPD, who presented to [facility] ED on 18 2 sudden onset difficulty swallowing since this morning. She describes feeling like "something is coming back up" when swallowing, and is unable to tolerate PO intake of liquids or solids. She reports fatigue, loss of appetite, nausea, vomiting, and weakness. She denies fever, facial swelling, oral lesions, beginning new medications, chest pain, SOB, or speech difficulty, abdominal pain, dysuria, decreased urination.     She was evaluated in the ED, and found to have dehydration, FRANCA, and failure to thrive. She was admitted to further treat the aforementioned, and discuss hospice.       * No surgery found *      Hospital Course:   Patient and son have agreed to [facility]. Case Management arranging a meeting with representative and family.  The patient;s condition continued to decline.  She was given supportive therapy with supplemental oxygen and pain management.  The patient  with family members at the bedside.  Nursing and  at the bedside for extended support.       Consults:   Consults         Status Ordering Provider     Inpatient consult to Registered Dietitian/Nutritionist  Once     Provider:  (Not yet assigned)    Completed FREDY SWANSON     Inpatient consult to Social Work  Once     Provider:  (Not yet assigned)    Completed FREDY SWANSON          No new Assessment & Plan notes have been filed under this hospital service since the last note was " generated.  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 [C34.90] 02/15/2017 Yes    Essential hypertension [I10] 2017 Yes    Adult failure to thrive [R62.7] 2018 Yes    Type 2 diabetes mellitus with complication [E11.8] 2017 Yes    COPD (chronic obstructive pulmonary disease) [J44.9] 02/15/2017 Yes    Healing pressure ulcer stage III [L89.93] 2018 Yes    Dysphagia [R13.10] 2018 Yes    Thrombocytopenia due to drugs [D69.59, T50.905A] 2017 Yes      Problems Resolved During this Admission:    Diagnosis Date Noted Date Resolved POA       Discharged Condition:     Disposition:     Follow Up:     Patient Instructions:   No discharge procedures on file.    Significant Diagnostic Studies: Labs:   CMP   Recent Labs  Lab 18  0546 18  0440   * 147*   K 4.4 4.7   * 115*   CO2 21* 22*   * 191*   BUN 53* 48*   CREATININE 1.1 1.2   CALCIUM 8.6* 9.0   ANIONGAP 11 10   ESTGFRAFRICA >60 57*   EGFRNONAA 55* 49*    and CBC   Recent Labs  Lab 18  0546 18  0440   WBC 9.65 8.56   HGB 10.2* 10.3*   HCT 34.5* 36.0*   * 104*       Pending Diagnostic Studies:     None         Medications:  None (patient  at medical facility)    Indwelling Lines/Drains at time of discharge:   Lines/Drains/Airways     Central Venous Catheter Line                 Percutaneous Central Line Insertion/Assessment - triple lumen  18 1908 right internal jugular 2 days                Time spent on the discharge of patient: 20 minutes  Patient was seen and examined on the date of discharge and determined to be suitable for discharge.         Connie Dowd MD  Department of Hospital Medicine  [facility]

## 2018-02-14 NOTE — SIGNIFICANT EVENT
Called to the bedside.  Patient with no spontaneous respirations or heart tones.  Pupils fixed and dilated.  Sons at the bedside.  Patient pronounced 0948.

## 2018-02-14 NOTE — NURSING
Called Dr. Dowd re: decline in pt status.  BP 67/50, unable to get temp and SpO2 reading.  Pt breathing agonal; bilateral crackles in middle and lower lung bases.    Nile ordered venti mask at 40% O2, no IV fluids, hold off on hospice, and increase frequency of morphine to Q2. Informed family and contacted  for family.  and Dr. Dowd to visit with family shortly.

## 2018-02-14 NOTE — PLAN OF CARE
Problem: Patient Care Overview  Goal: Plan of Care Review  Outcome: Ongoing (interventions implemented as appropriate)  Received pt on venti mask 50% unable to get a reading on pt saturations will continue to monitor. pt

## 2018-02-14 NOTE — PHYSICIAN QUERY
PT Name: Licha Pop  MR #: [id number]    Physician Query Form - Nutrition Clarification       CDS: [place] RN CCDS                  Contact Information: [email or web address]    This form is a permanent document in the medical record.     Query Date: February 14, 2018    By submitting this query, we are merely seeking further clarification of documentation.. Please utilize your independent clinical judgment when addressing the question(s) below.    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  RD note 2/12      H&P       AND / ASPEN Clinical Characteristics (October 2011)  A minimum of two characteristics is recommended for diagnosing either moderate or severe malnutrition   Mild Malnutrition Moderate Malnutrition Severe Malnutrition   Energy Intake from p.o., TF or TPN. < 75% intake of estimated energy needs for less than 7 days < 75% intake of estimated energy needs for greater than 7 days < 50% intake of estimated energy needs for > 5 days   Weight Loss 1-2% in 1 month  5% in 3 months  7.5% in 6 months  10% in 1 year 1-2 % in 1 week  5% in 1 month  7.5% in 3 months  10% in 6 months  20% in 1 year > 2% in 1 week  > 5% in 1 month  > 7.5% in 3 months  > 10% in 6 months  > 20% in 1 year    Physical Findings     None *Mild subcutaneous fat and/or muscle loss  *Mild fluid accumulation  *Stage II decubitus  *Surgical wound or non-healing wound *Mod/severe subcutaneous fat and/or muscle loss  *Mod/severe fluid accumulation  *Stage III or IV decubitus  *Non-healing surgical wound     Provider, please specify diagnosis or diagnoses associated with above clinical findings.    [ x] Moderate Protein-Calorie Malnutrition  [ ] Severe Protein-Calorie Malnutrition  [ ] Other Nutritional Diagnosis (please specify): ____________________________________  [ ] Other: ________________________________  [ ] Clinically Undetermined    Please document in your progress notes daily for the duration of treatment until resolved and include in your discharge summary.

## 2018-02-14 NOTE — CHAPLAIN
Met with family, two of patient's sons, at the bedside as the patient made her transition offering compassionate presence, spiritual support, and prayer.    .  Post-death, decedent paperwork is complete and logged.  CARLOS released, agent Berenice Hall.  No  report as this case did not meet  criteria.    .  Followed up meeting with the patient's older son and his wife offering a spiritual presence and answering questions regarding 'next steps'.  The family has chosen [facility].      Ivette Juarez  [phone]
